# Patient Record
Sex: FEMALE | Race: WHITE | NOT HISPANIC OR LATINO | Employment: OTHER | URBAN - METROPOLITAN AREA
[De-identification: names, ages, dates, MRNs, and addresses within clinical notes are randomized per-mention and may not be internally consistent; named-entity substitution may affect disease eponyms.]

---

## 2017-01-18 ENCOUNTER — APPOINTMENT (OUTPATIENT)
Dept: MEDICAL GROUP | Facility: MEDICAL CENTER | Age: 72
End: 2017-01-18
Payer: MEDICARE

## 2017-03-08 ENCOUNTER — OFFICE VISIT (OUTPATIENT)
Dept: MEDICAL GROUP | Facility: MEDICAL CENTER | Age: 72
End: 2017-03-08
Payer: MEDICARE

## 2017-03-08 VITALS
DIASTOLIC BLOOD PRESSURE: 80 MMHG | HEIGHT: 65 IN | BODY MASS INDEX: 29.66 KG/M2 | SYSTOLIC BLOOD PRESSURE: 110 MMHG | TEMPERATURE: 98.1 F | WEIGHT: 178 LBS | OXYGEN SATURATION: 95 % | HEART RATE: 66 BPM

## 2017-03-08 DIAGNOSIS — J30.1 SEASONAL ALLERGIC RHINITIS DUE TO POLLEN: ICD-10-CM

## 2017-03-08 DIAGNOSIS — F41.9 ANXIETY: ICD-10-CM

## 2017-03-08 DIAGNOSIS — R41.3 MEMORY LOSS: ICD-10-CM

## 2017-03-08 DIAGNOSIS — E55.9 VITAMIN D DEFICIENCY: ICD-10-CM

## 2017-03-08 DIAGNOSIS — C44.91 BASAL CELL CARCINOMA: ICD-10-CM

## 2017-03-08 DIAGNOSIS — E78.5 HYPERLIPIDEMIA WITH TARGET LDL LESS THAN 130: ICD-10-CM

## 2017-03-08 DIAGNOSIS — R73.01 IMPAIRED FASTING GLUCOSE: ICD-10-CM

## 2017-03-08 DIAGNOSIS — K63.5 COLON POLYPS: ICD-10-CM

## 2017-03-08 DIAGNOSIS — N20.0 KIDNEY STONES: ICD-10-CM

## 2017-03-08 DIAGNOSIS — K64.8 INTERNAL HEMORRHOIDS: ICD-10-CM

## 2017-03-08 DIAGNOSIS — N84.1 CERVICAL POLYP: ICD-10-CM

## 2017-03-08 PROCEDURE — G0439 PPPS, SUBSEQ VISIT: HCPCS | Performed by: NURSE PRACTITIONER

## 2017-03-08 ASSESSMENT — PATIENT HEALTH QUESTIONNAIRE - PHQ9: CLINICAL INTERPRETATION OF PHQ2 SCORE: 0

## 2017-03-08 NOTE — ASSESSMENT & PLAN NOTE
Her last vitamin d in 2015 was 29.  She was on otc supplement but stopped.  Not on otc supplenment now.  Has done ergocalciferol in the past. Will recheck lab.

## 2017-03-08 NOTE — ASSESSMENT & PLAN NOTE
She needs to set f/u appt with derm.  She had a BCC removed from rt nose.  Hasn't seen them recently. She is known to [facility]

## 2017-03-08 NOTE — ASSESSMENT & PLAN NOTE
This was found during her last colonoscopy.  She will occas have cramping and itching.  None recently.  No blood in stool.  No black tarry stool.

## 2017-03-08 NOTE — MR AVS SNAPSHOT
"Mandie Juan   3/8/2017 9:00 AM   Office Visit   MRN: [id number]    Department:  [facility]   Dept Phone:  [phone]    Description:  Female : 1945   Provider:  VIRAJ Miller           Allergies as of 3/8/2017     Allergen Noted Reactions    Compazine 2009       Pcn [Penicillins] 2009       Sulfa Drugs 2009         You were diagnosed with     Impaired fasting glucose   [790.21.ICD-9-CM]   recheck a1c.  improve healthy diet and exercise.  do lab and f/u for review    Internal hemorrhoids   [256546]   use otc meds unless sx worsen.  then f/u for eval    Anxiety   [722745]   refer psych    Cervical polyp   [634790]   no current sx.  was removed.  will do vag exam with [email or web address] appt to review lab    Kidney stones   [801004]   no sx or tx needed    Colon polyps   [041637]   no sx or tx needed. last colonoscopy wnl.  repeat 5 yrs    Vitamin D deficiency   [9435847]   not on otc supplement.  will restart.  repeat lab and f/u for review    Hyperlipidemia with target LDL less than 130   [651499]   repeat lab.  last trg not at goal    Basal cell carcinoma   [555049]   refer to derm    Seasonal allergic rhinitis due to pollen   [9731505]   no current sx or tx needed    Memory loss   [780.93.ICD-9-CM]   f/u for complete eval.  do lab and f/u for eview      Vital Signs     Blood Pressure Pulse Temperature Height Weight Body Mass Index    110/80 mmHg 66 36.7 °C (98.1 °F) 1.651 m (5' 5\") 80.74 kg (178 lb) 29.62 kg/m2    Oxygen Saturation Last Menstrual Period Breastfeeding? Smoking Status          95% 1993 No Never Smoker         Basic Information     Date Of Birth Sex Race Ethnicity Preferred Language    1945 Female White Non- English      Problem List              ICD-10-CM Priority Class Noted - Resolved    Cervical polyp N84.1   Unknown - Present    Kidney stones N20.0   Unknown - Present    Colon polyps K63.5   Unknown - Present    Preventative health " care Z00.00   5/3/2010 - Present    Vitamin D deficiency E55.9   11/3/2011 - Present    Hyperlipidemia with target LDL less than 130 E78.5   11/3/2011 - Present    Basal cell carcinoma C44.91   Unknown - Present    Allergic rhinitis due to allergen J30.9   2/21/2014 - Present    Impaired fasting glucose R73.01   Unknown - Present    Internal hemorrhoids K64.8   Unknown - Present    Anxiety F41.9   Unknown - Present      Health Maintenance        Date Due Completion Dates    IMM ZOSTER VACCINE 10/4/2003 ---    MAMMOGRAM 11/9/2017 11/9/2016, 8/11/2015, 5/29/2014, 1/16/2013, 9/29/2011, 9/21/2010, 5/14/2009, 5/14/2009, 12/27/2006, 12/27/2006, 11/3/2005, 8/5/2004    BONE DENSITY 5/29/2019 5/29/2014, 9/29/2011    COLONOSCOPY 10/22/2020 10/22/2015    IMM DTaP/Tdap/Td Vaccine (2 - Td) 11/30/2022 11/30/2012            Current Immunizations     13-VALENT PCV PREVNAR 11/8/2016    Influenza TIV (IM) 12/6/2012, 11/3/2011  2:46 PM    Influenza Vaccine Adult HD 11/8/2016, 11/1/2015    Pneumococcal polysaccharide vaccine (PPSV-23) 12/31/2014    Tdap Vaccine 11/30/2012      Below and/or attached are the medications your provider expects you to take. Review all of your home medications and newly ordered medications with your provider and/or pharmacist. Follow medication instructions as directed by your provider and/or pharmacist. Please keep your medication list with you and share with your provider. Update the information when medications are discontinued, doses are changed, or new medications (including over-the-counter products) are added; and carry medication information at all times in the event of emergency situations     Allergies:  COMPAZINE - (reactions not documented)     PCN - (reactions not documented)     SULFA DRUGS - (reactions not documented)               Medications  Valid as of: March 08, 2017 - 10:36 AM    Generic Name Brand Name Tablet Size Instructions for use    Atorvastatin Calcium (Tab) LIPITOR 10 MG TAKE ONE  (1) TABLET BY MOUTH EVERY DAY        Losartan Potassium-HCTZ (Tab) HYZAAR 50-12.5 MG TAKE ONE (1) TABLET BY MOUTH EVERY DAY        Omeprazole (CAPSULE DELAYED RELEASE) PRILOSEC 20 MG Take 1 Cap by mouth every day.        .                 Medicines prescribed today were sent to:     [id number] - [place], [place] - [address]    [address] [place] [address]    Phone: [phone] Fax: [phone]    Open 24 Hours?: No      Medication refill instructions:       If your prescription bottle indicates you have medication refills left, it is not necessary to call your provider’s office. Please contact your pharmacy and they will refill your medication.    If your prescription bottle indicates you do not have any refills left, you may request refills at any time through one of the following ways: The online [employer] system (except Urgent Care), by calling your provider’s office, or by asking your pharmacy to contact your provider’s office with a refill request. Medication refills are processed only during regular business hours and may not be available until the next business day. Your provider may request additional information or to have a follow-up visit with you prior to refilling your medication.   *Please Note: Medication refills are assigned a new Rx number when refilled electronically. Your pharmacy may indicate that no refills were authorized even though a new prescription for the same medication is available at the pharmacy. Please request the medicine by name with the pharmacy before contacting your provider for a refill.        Your To Do List     Future Labs/Procedures Complete By Expires    COMP METABOLIC PANEL  As directed 3/9/2018    FOLATE  As directed 3/9/2018    FREE THYROXINE  As directed 3/9/2018    HEMOGLOBIN A1C  As directed 3/9/2018    LIPID PROFILE  As directed 3/9/2018    MICROALBUMIN CREAT RATIO URINE  As directed 3/9/2018    TSH  As directed 3/9/2018    VITAMIN B12  As directed 3/9/2018       VITAMIN D,25 HYDROXY  As directed 3/9/2018      Referral     A referral request has been sent to our patient care coordination department. Please allow 3-5 business days for us to process this request and contact you either by phone or mail. If you do not hear from us by the 5th business day, please call us at [phone].           [employer] Access Code: Activation code not generated  Current [employer] Status: Active

## 2017-03-08 NOTE — ASSESSMENT & PLAN NOTE
She is having some anxiety.  Feeling overwhelmed.  At andry time lots of stress. S he has her granddaughter all the time.  Her daughter was having etoh issues.  She is not on meds at this time.  She would  To see a counselor.  Doesn't want meds at this time.

## 2017-03-08 NOTE — ASSESSMENT & PLAN NOTE
Her last colonoscoyp in 2015 was wnl.  Internal hemorrhoids only.  Due recheck in 5 yrs.  No current issues.   No tx needed

## 2017-03-08 NOTE — PROGRESS NOTES
Subjective:      Mandie Juan is a 71 y.o. female who presents with No chief complaint on file.            HPI  Seen in f/u for [facility].  Feeling poorly.  She has been not feeling well for the last 2 weeks.  She is just now getting better. S he had bronchitis.  Not couging any more.  Used mucinex and otc cough med.  No fever, sore throat.  No SOB or wheezing.  Is still fatigued.  She is due her routine lab.    She is having some memory loss.  She has had 2 episodes of forgetting what year it was after having intercourse.  No headache.  She will have occas pain at the top of her head.  It is sharp but not with intercourse.      Internal hemorrhoids  This was found during her last colonoscopy.  She will occas have cramping and itching.  None recently.  No blood in stool.  No black tarry stool.     Anxiety  She is having some anxiety.  Feeling overwhelmed.  At andry time lots of stress. S he has her granddaughter all the time.  Her daughter was having etoh issues.  She is not on meds at this time.  She would  To see a counselor.  Doesn't want meds at this time.      Cervical Polyp  She had a cervical polyp removed about 5 years ago.  It was removed and benign.  Will recheck once wiht next appt.  No sx or issues.     Kidney Stones  No current sx or tx needed.      Colon Polyps  Her last colonoscoyp in 2015 was wnl.  Internal hemorrhoids only.  Due recheck in 5 yrs.  No current issues.   No tx needed    Vitamin D deficiency  Her last vitamin d in 2015 was 29.  She was on otc supplement but stopped.  Not on otc supplenment now.  Has done ergocalciferol in the past. Will recheck lab.     Hyperlipidemia with target LDL less than 130  Her last LP in 7/16 showed elevated trg.  Her HDL and LDL were wnl.  She is not on a low carb diet.  That is what makes her stomach feel well.  Not able to exercise this winter.  She is due repeat lab    Basal cell carcinoma  She needs to set f/u appt with derm.  She had a BCC removed from  rt nose.  Hasn't seen them recently. She is known to [facility]    Allergic rhinitis due to allergen  No current sx or tx needed.  No sx of infection with recent bronchitis    Impaired fasting glucose  Her last a1c last year was mildly elevated.  She is due repeat lab.  Not on low carb diet          HPI:  Mandie is a 71 y.o. here for Medicare Annual Wellness Visit     Patient Active Problem List    Diagnosis Date Noted   • Impaired fasting glucose    • Internal hemorrhoids    • Anxiety    • Allergic rhinitis due to allergen 02/21/2014   • Basal cell carcinoma    • Vitamin d deficiency 11/03/2011   • Hyperlipidemia with target LDL less than 130 11/03/2011   • Preventative health care 05/03/2010   • Cervical polyp    • Kidney stones    • Colon polyps        Current Outpatient Prescriptions   Medication Sig Dispense Refill   • atorvastatin (LIPITOR) 10 MG Tab TAKE ONE (1) TABLET BY MOUTH EVERY DAY 90 Tab 1   • losartan-hydrochlorothiazide (HYZAAR) 50-12.5 MG per tablet TAKE ONE (1) TABLET BY MOUTH EVERY DAY 30 Tab 5   • omeprazole (PRILOSEC) 20 MG delayed-release capsule Take 1 Cap by mouth every day. 30 Cap 3     No current facility-administered medications for this visit.            Current supplements as per medication list.       Allergies: Compazine; Pcn; and Sulfa drugs    Current social contact/activities:   1.  Reading  2.  skiing  3.  Walking    She  reports that she has never smoked. She has never used smokeless tobacco. She reports that she drinks about 2.0 oz of alcohol per week. She reports that she does not use illicit drugs.  Counseling given: Yes        DPA/Advanced directive: Patient do not have an advanced directive. If not on file, instructed to bring in a copy to scan into their chart. If no advanced directive exists, a packet and workshop information was given on advanced directives. Pt given info    ROS:    Gait: Uses no assistive device   Ostomy: no  Other tubes: no   Amputations: no   Chronic  oxygen use no   Last eye exam 2 weeks ago  : {DENIES DEFAULT:20195::occas mild incontinence.       Screening:    Depression Screening    Little interest or pleasure in doing things?  0 - not at all  Feeling down, depressed , or hopeless? 0 - not at all  Trouble falling or staying asleep, or sleeping too much?     Feeling tired or having little energy?     Poor appetite or overeating?     Feeling bad about yourself - or that you are a failure or have let yourself or your family down?    Trouble concentrating on things, such as reading the newspaper or watching television?    Moving or speaking so slowly that other people could have noticed.  Or the opposite - being so fidgety or restless that you have been moving around a lot more than usual?     Thoughts that you would be better off dead, or of hurting yourself?     Patient Health Questionnaire Score:      If depressive symptoms identified deferred to follow up visit unless specifically addressed in assesment and plan.      Screening for Cognitive Impairment    Three Minute Recall (banana, sunrise, fence)  3/3    Draw clock face with all 12 numbers set to the hand to show 10 minures past 11 o'clock  1    Cognitive concerns identified defferred for follow up unless specifically addressed in assesment and plan.    Fall Risk Assessment    Has the patient had two or more falls in the last year or any fall with injury in the last year?  No    Safety Assessment    Throw rugs on floor.  Yes  Handrails on all stairs.  Yes  Good lighting in all hallways.  Yes  Difficulty hearing.  No  Patient counseled about all safety risks that were identified.    Functional Assessment ADLs    Are there any barriers preventing you from cooking for yourself or meeting nutritional needs?  No.    Are there any barriers preventing you from driving safely or obtaining transportation?  No.    Are there any barriers preventing you from using a telephone or calling for help?  No.    Are there  "any barriers preventing you from shopping?  No.    Are there any barriers preventing you from taking care of your own finances?  No.    Are there any barriers preventing you from managing your medications?  No.    Are currently engaing any exercise or physical activity?  Yes.       Health Maintenance Summary                IMM ZOSTER VACCINE Overdue 10/4/2003     Annual Wellness Visit Overdue 2/22/2015      Done 2/21/2014     MAMMOGRAM Next Due 11/9/2017      Done 11/9/2016 MA-MAMMO SCREENING BILAT W/TOMOSYNTHESIS W/CAD     Patient has more history with this topic...    BONE DENSITY Next Due 5/29/2019      Done 5/29/2014 DS-BONE DENSITY STUDY (DEXA)     Patient has more history with this topic...    COLONOSCOPY Next Due 10/22/2020      Done 10/22/2015 AMB REFERRAL TO GI FOR COLONOSCOPY    IMM DTaP/Tdap/Td Vaccine Next Due 11/30/2022      Done 11/30/2012 Imm Admin: Tdap Vaccine          Patient Care Team:  VIRAJ Miller as PCP - General      Social History   Substance Use Topics   • Smoking status: Never Smoker    • Smokeless tobacco: Never Used   • Alcohol Use: 2.0 oz/week     4 Glasses of wine per week     Family History   Problem Relation Age of Onset   • Heart Disease Mother    • Cancer Father    • Hypertension Sister    • Hypertension Brother    • Cancer Paternal Grandfather      She  has a past medical history of Cervical polyp; Hyperlipidemia; Colon polyps; Kidney stones; Allergy; Vitamin d deficiency; Basal cell carcinoma (2004); Impaired fasting glucose; Internal hemorrhoids; and Anxiety. She also has no past medical history of Clotting disorder (CMS-HCC) or Breast cancer (CMS-HCC).   History reviewed. No pertinent past surgical history.    Exam:     Blood pressure 110/80, pulse 66, temperature 36.7 °C (98.1 °F), height 1.651 m (5' 5\"), weight 80.74 kg (178 lb), last menstrual period 03/01/1993, SpO2 95 %, not currently breastfeeding. Body mass index is 29.62 kg/(m^2).    Hearing excellent.  "   Dentition good  Alert, oriented in no acute distress.  Eye contact is good, speech goal directed, affect calm        Services needed: as per orders if indicated.  Health Care Screening: Age-appropriate preventive services Medicare covers discussed today and ordered if indicated.    Referrals offered: Community-based lifestyle interventions to reduce health risks and promote self-management and wellness, fall prevention, nutrition, physical activity, tobacco-use cessation, weight loss, and mental health services as per orders if indicated.    Discussion today about general wellness and lifestyle habits:    · Prevent falls and reduce trip hazards; Cautioned about securing or removing rugs.  · Have a working fire alarm and carbon monoxide detector;   · Engage in regular physical activity and social activities              Patient Active Problem List    Diagnosis Date Noted   • Impaired fasting glucose    • Allergic rhinitis due to allergen 02/21/2014   • Basal cell carcinoma    • Vitamin d deficiency 11/03/2011   • Hyperlipidemia with target LDL less than 130 11/03/2011   • Preventative health care 05/03/2010   • Cervical polyp    • Kidney stones    • Colon polyps      Current Outpatient Prescriptions   Medication Sig Dispense Refill   • atorvastatin (LIPITOR) 10 MG Tab TAKE ONE (1) TABLET BY MOUTH EVERY DAY 90 Tab 1   • losartan-hydrochlorothiazide (HYZAAR) 50-12.5 MG per tablet TAKE ONE (1) TABLET BY MOUTH EVERY DAY 30 Tab 5   • omeprazole (PRILOSEC) 20 MG delayed-release capsule Take 1 Cap by mouth every day. 30 Cap 3     No current facility-administered medications for this visit.     Allergies   Allergen Reactions   • Compazine    • Pcn [Penicillins]    • Sulfa Drugs        ROS  Review of Systems   Constitutional: Negative.  Negative for fever, chills, weight loss, iaphoresis.   HENT: Negative.  Negative for hearing loss, ear pain, nosebleeds, sore throat, neck pain, tinnitus and ear discharge.    Eyes:  "Negative.  Negative for blurred vision, double vision, photophobia, pain, discharge and redness.   Respiratory: Negative.  Negative for hemoptysis, shortness of breath, wheezing and stridor.    Cardiovascular: Negative.  Negative for chest pain, palpitations, orthopnea, claudication, leg swelling and PND.   Gastrointestinal: Negative.  Negative for heartburn, nausea, vomiting, abdominal pain, diarrhea, constipation, blood in stool and melena.   Genitourinary: Negative.  Negative for dysuria, urgency, frequency, incontinence, hematuria and flank pain.   Musculoskeletal: Negative.  Negative for myalgias, back pain, joint pain and falls.   Skin: Negative.  Negative for itching and rash.   Neurological: Negative.  Negative for dizziness, tingling, tremors, sensory change, speech change, focal weakness, seizures, loss of consciousness, weakness and headaches.   Endo/Heme/Allergies: Negative.  Negative for environmental allergies and polydipsia. Does not bruise/bleed easily.   Psychiatric/Behavioral: Negative.  Negative for depression, suicidal ideas, hallucinations, memory loss and substance abuse. The patient does not have insomnia.    All other systems reviewed and are negative.           Objective:     /80 mmHg  Pulse 66  Temp(Src) 36.7 °C (98.1 °F)  Ht 1.651 m (5' 5\")  Wt 80.74 kg (178 lb)  BMI 29.62 kg/m2  SpO2 95%  LMP 03/01/1993  Breastfeeding? No     Physical Exam      Physical Exam   Vitals reviewed.  Constitutional: oriented to person, place, and time. appears well-developed and well-nourished. No distress.   HENT: Head: Normocephalic and atraumatic. Bilateral tympanic membranes wnl w/o bulging.  Right Ear: External ear normal. Left Ear: External ear normal. Nose: Nose normal.  Mouth/Throat: Oropharynx is clear and moist. No oropharyngeal exudate. osbaldo tm wnl. Eyes: Conjunctivae and EOM are normal. Pupils are equal, round, and reactive to light. Right eye exhibits no discharge. Left eye exhibits no " discharge. No scleral icterus.    Neck: Normal range of motion. Neck supple. No JVD present.   Cardiovascular: Normal rate, regular rhythm, normal heart sounds and intact distal pulses.  Exam reveals no gallop and no friction rub.  No murmur heard.  No carotid bruits   Pulmonary/Chest: Effort normal and breath sounds normal. No stridor. No respiratory distress. no wheezes or rales. exhibits no tenderness.   Abdominal: Soft. Bowel sounds are normal. exhibits no distension and no mass. No tenderness. no rebound and no guarding.   Musculoskeletal: Normal range of motion. exhibits no edema or tenderness.  osbaldo pedal pulses 2+.  Lymphadenopathy:  no cervical or supraclavicular adenopathy.   Neurological: alert and oriented to person, place, and time. has normal reflexes. displays normal reflexes. No cranial nerve deficit. exhibits normal muscle tone. Coordination normal.   Skin: Skin is warm and dry. No rash noted. no diaphoresis. No erythema. No pallor.   Psychiatric: normal mood and affect. behavior is normal.            Assessment/Plan:     1. Impaired fasting glucose  Annual Wellness Visit - Includes PPPS Subsequent ()    COMP METABOLIC PANEL    HEMOGLOBIN A1C    MICROALBUMIN CREAT RATIO URINE    recheck a1c.  improve healthy diet and exercise.  do lab and f/u for review   2. Internal hemorrhoids  Annual Wellness Visit - Includes PPPS Subsequent ()    use otc meds unless sx worsen.  then f/u for eval   3. Anxiety  Annual Wellness Visit - Includes PPPS Subsequent ()    REFERRAL TO PSYCHOLOGY    refer psych   4. Cervical polyp  Annual Wellness Visit - Includes PPPS Subsequent ()    no current sx.  was removed.  will do vag exam with [facility] appt to review lab   5. Kidney stones  Annual Wellness Visit - Includes PPPS Subsequent ()    no sx or tx needed   6. Colon polyps  Annual Wellness Visit - Includes PPPS Subsequent ()    no sx or tx needed. last colonoscopy wnl.  repeat 5 yrs   7.  Vitamin D deficiency  Annual Wellness Visit - Includes PPPS Subsequent ()    VITAMIN D,25 HYDROXY    not on otc supplement.  will restart.  repeat lab and f/u for review   8. Hyperlipidemia with target LDL less than 130  Annual Wellness Visit - Includes PPPS Subsequent ()    COMP METABOLIC PANEL    LIPID PROFILE    repeat lab.  last trg not at goal   9. Basal cell carcinoma  Annual Wellness Visit - Includes PPPS Subsequent ()    REFERRAL TO DERMATOLOGY    refer to derm   10. Seasonal allergic rhinitis due to pollen  Annual Wellness Visit - Includes PPPS Subsequent ()    no current sx or tx needed   11. Memory loss  Annual Wellness Visit - Includes PPPS Subsequent ()    TSH    FREE THYROXINE    VITAMIN B12    FOLATE    f/u for complete eval.  do lab and f/u for eview

## 2017-03-08 NOTE — ASSESSMENT & PLAN NOTE
She had a cervical polyp removed about 5 years ago.  It was removed and benign.  Will recheck once wiht next appt.  No sx or issues.

## 2017-03-08 NOTE — ASSESSMENT & PLAN NOTE
Her last LP in 7/16 showed elevated trg.  Her HDL and LDL were wnl.  She is not on a low carb diet.  That is what makes her stomach feel well.  Not able to exercise this winter.  She is due repeat lab

## 2017-03-13 RX ORDER — LOSARTAN POTASSIUM AND HYDROCHLOROTHIAZIDE 12.5; 5 MG/1; MG/1
TABLET ORAL
Qty: 90 TAB | Refills: 3 | Status: SHIPPED | OUTPATIENT
Start: 2017-03-13 | End: 2018-04-26 | Stop reason: SDUPTHER

## 2017-03-23 ENCOUNTER — HOSPITAL ENCOUNTER (OUTPATIENT)
Dept: LAB | Facility: MEDICAL CENTER | Age: 72
End: 2017-03-23
Attending: NURSE PRACTITIONER
Payer: MEDICARE

## 2017-03-23 DIAGNOSIS — E78.5 HYPERLIPIDEMIA WITH TARGET LDL LESS THAN 130: ICD-10-CM

## 2017-03-23 DIAGNOSIS — R41.3 MEMORY LOSS: ICD-10-CM

## 2017-03-23 DIAGNOSIS — E55.9 VITAMIN D DEFICIENCY: ICD-10-CM

## 2017-03-23 DIAGNOSIS — R73.01 IMPAIRED FASTING GLUCOSE: ICD-10-CM

## 2017-03-23 LAB
25(OH)D3 SERPL-MCNC: 20 NG/ML (ref 30–100)
ALBUMIN SERPL BCP-MCNC: 4.2 G/DL (ref 3.2–4.9)
ALBUMIN/GLOB SERPL: 1.4 G/DL
ALP SERPL-CCNC: 65 U/L (ref 30–99)
ALT SERPL-CCNC: 24 U/L (ref 2–50)
ANION GAP SERPL CALC-SCNC: 7 MMOL/L (ref 0–11.9)
AST SERPL-CCNC: 15 U/L (ref 12–45)
BILIRUB SERPL-MCNC: 0.5 MG/DL (ref 0.1–1.5)
BUN SERPL-MCNC: 15 MG/DL (ref 8–22)
CALCIUM SERPL-MCNC: 9.7 MG/DL (ref 8.5–10.5)
CHLORIDE SERPL-SCNC: 102 MMOL/L (ref 96–112)
CHOLEST SERPL-MCNC: 206 MG/DL (ref 100–199)
CO2 SERPL-SCNC: 29 MMOL/L (ref 20–33)
CREAT SERPL-MCNC: 0.69 MG/DL (ref 0.5–1.4)
CREAT UR-MCNC: 189.3 MG/DL
EST. AVERAGE GLUCOSE BLD GHB EST-MCNC: 140 MG/DL
FOLATE SERPL-MCNC: 16.2 NG/ML
GLOBULIN SER CALC-MCNC: 2.9 G/DL (ref 1.9–3.5)
GLUCOSE SERPL-MCNC: 117 MG/DL (ref 65–99)
HBA1C MFR BLD: 6.5 % (ref 0–5.6)
HDLC SERPL-MCNC: 74 MG/DL
LDLC SERPL CALC-MCNC: 106 MG/DL
MICROALBUMIN UR-MCNC: 1.2 MG/DL
MICROALBUMIN/CREAT UR: 6 MG/G (ref 0–30)
POTASSIUM SERPL-SCNC: 3.6 MMOL/L (ref 3.6–5.5)
PROT SERPL-MCNC: 7.1 G/DL (ref 6–8.2)
SODIUM SERPL-SCNC: 138 MMOL/L (ref 135–145)
T4 FREE SERPL-MCNC: 0.8 NG/DL (ref 0.53–1.43)
TRIGL SERPL-MCNC: 131 MG/DL (ref 0–149)
TSH SERPL DL<=0.005 MIU/L-ACNC: 2.52 UIU/ML (ref 0.3–3.7)
VIT B12 SERPL-MCNC: 396 PG/ML (ref 211–911)

## 2017-03-23 PROCEDURE — 82570 ASSAY OF URINE CREATININE: CPT

## 2017-03-23 PROCEDURE — 83036 HEMOGLOBIN GLYCOSYLATED A1C: CPT

## 2017-03-23 PROCEDURE — 82306 VITAMIN D 25 HYDROXY: CPT

## 2017-03-23 PROCEDURE — 36415 COLL VENOUS BLD VENIPUNCTURE: CPT

## 2017-03-23 PROCEDURE — 84439 ASSAY OF FREE THYROXINE: CPT

## 2017-03-23 PROCEDURE — 84443 ASSAY THYROID STIM HORMONE: CPT

## 2017-03-23 PROCEDURE — 80053 COMPREHEN METABOLIC PANEL: CPT

## 2017-03-23 PROCEDURE — 82607 VITAMIN B-12: CPT

## 2017-03-23 PROCEDURE — 82746 ASSAY OF FOLIC ACID SERUM: CPT

## 2017-03-23 PROCEDURE — 82043 UR ALBUMIN QUANTITATIVE: CPT

## 2017-03-23 PROCEDURE — 80061 LIPID PANEL: CPT

## 2017-04-04 ENCOUNTER — OFFICE VISIT (OUTPATIENT)
Dept: MEDICAL GROUP | Facility: MEDICAL CENTER | Age: 72
End: 2017-04-04
Payer: MEDICARE

## 2017-04-04 VITALS
HEART RATE: 85 BPM | BODY MASS INDEX: 29.66 KG/M2 | HEIGHT: 65 IN | DIASTOLIC BLOOD PRESSURE: 70 MMHG | OXYGEN SATURATION: 95 % | SYSTOLIC BLOOD PRESSURE: 128 MMHG | WEIGHT: 178 LBS | TEMPERATURE: 97.7 F

## 2017-04-04 DIAGNOSIS — E55.9 VITAMIN D DEFICIENCY: ICD-10-CM

## 2017-04-04 DIAGNOSIS — R41.3 MEMORY LOSS, SHORT TERM: ICD-10-CM

## 2017-04-04 DIAGNOSIS — F41.9 ANXIETY: ICD-10-CM

## 2017-04-04 PROCEDURE — 99214 OFFICE O/P EST MOD 30 MIN: CPT | Performed by: NURSE PRACTITIONER

## 2017-04-04 PROCEDURE — G8432 DEP SCR NOT DOC, RNG: HCPCS | Performed by: NURSE PRACTITIONER

## 2017-04-04 PROCEDURE — 1036F TOBACCO NON-USER: CPT | Performed by: NURSE PRACTITIONER

## 2017-04-04 PROCEDURE — 1101F PT FALLS ASSESS-DOCD LE1/YR: CPT | Performed by: NURSE PRACTITIONER

## 2017-04-04 PROCEDURE — G8419 CALC BMI OUT NRM PARAM NOF/U: HCPCS | Performed by: NURSE PRACTITIONER

## 2017-04-04 PROCEDURE — 4040F PNEUMOC VAC/ADMIN/RCVD: CPT | Performed by: NURSE PRACTITIONER

## 2017-04-04 PROCEDURE — 3014F SCREEN MAMMO DOC REV: CPT | Performed by: NURSE PRACTITIONER

## 2017-04-04 RX ORDER — ERGOCALCIFEROL 1.25 MG/1
50000 CAPSULE ORAL
Qty: 12 CAP | Refills: 0 | Status: SHIPPED | OUTPATIENT
Start: 2017-04-04 | End: 2017-10-30

## 2017-04-04 NOTE — PROGRESS NOTES
Subjective:      Mandie Juan is a 71 y.o. female who presents with No chief complaint on file.            HPI  Seen in f/u for dec memory.  She has had dec memory for the last year.  Gradually worsening.    She has had 2 episodes of forgetting what year it was after intercourse.  No headache.  Checked b12, folate, TSH and T4.  They were all wnl.  The 2nd time it occurred she thinks r/t anxiety.  She is having less anxiety with dec family stress now.    Vitamin d was low at 20.  Not on otc supplement.    Patient Active Problem List    Diagnosis Date Noted   • Impaired fasting glucose    • Internal hemorrhoids    • Anxiety    • Allergic rhinitis due to allergen 02/21/2014   • Basal cell carcinoma    • Vitamin D deficiency 11/03/2011   • Hyperlipidemia with target LDL less than 130 11/03/2011   • Preventative health care 05/03/2010   • Cervical polyp    • Kidney stones    • Colon polyps      Current Outpatient Prescriptions   Medication Sig Dispense Refill   • losartan-hydrochlorothiazide (HYZAAR) 50-12.5 MG per tablet TAKE ONE (1) TABLET BY MOUTH EVERY DAY 90 Tab 3   • atorvastatin (LIPITOR) 10 MG Tab TAKE ONE (1) TABLET BY MOUTH EVERY DAY 90 Tab 1   • omeprazole (PRILOSEC) 20 MG delayed-release capsule Take 1 Cap by mouth every day. 30 Cap 3     No current facility-administered medications for this visit.     Allergies   Allergen Reactions   • Compazine    • Pcn [Penicillins]    • Sulfa Drugs        ROS  Review of Systems   Constitutional: Negative.  Negative for fever, chills, weight loss, malaise/fatigue and diaphoresis.   HENT: Negative.  Negative for hearing loss, ear pain, nosebleeds, congestion, sore throat, neck pain, tinnitus and ear discharge.    Respiratory: Negative.  Negative for cough, hemoptysis, sputum production, shortness of breath, wheezing and stridor.    Cardiovascular: Negative.  Negative for chest pain, palpitations, orthopnea, claudication, leg swelling and PND.              Objective:  "    /70 mmHg  Pulse 85  Temp(Src) 36.5 °C (97.7 °F)  Ht 1.651 m (5' 5\")  Wt 80.74 kg (178 lb)  BMI 29.62 kg/m2  SpO2 95%  LMP 03/01/1993  Breastfeeding? No     Physical Exam      Physical Exam   Vitals reviewed.  Constitutional: oriented to person, place, and time. appears well-developed and well-nourished. No distress.   Cardiovascular: Normal rate, regular rhythm, normal heart sounds and intact distal pulses.  Exam reveals no gallop and no friction rub.  No murmur heard.  No carotid bruits.   Pulmonary/Chest: Effort normal and breath sounds normal. No stridor. No respiratory distress. no wheezes or rales. exhibits no tenderness.   Musculoskeletal: Normal range of motion. exhibits no edema. osbaldo pedal pulses 2+.  Neurological: alert and oriented to person, place, and time. exhibits normal muscle tone. Coordination normal.   Skin: Skin is warm and dry. no diaphoresis.   Psychiatric: normal mood and affect. behavior is normal.            Assessment/Plan:     1. Memory loss, short term  CT-HEAD W/O    MMSE 30/30.  will do CT head.  lab is normal except d.  f/u 1 week for pelvic exam and lab review   2. Vitamin D deficiency  vitamin D, Ergocalciferol, (DRISDOL) 03975 UNITS Cap capsule    ergocalciferol x 12 weeks then d3 2000 units dialy    3. Anxiety      improved.  f/u with couselor as sched.         "

## 2017-04-04 NOTE — MR AVS SNAPSHOT
"Mandie Juan   2017 9:45 AM   Office Visit   MRN: [id number]    Department:  [facility]   Dept Phone:  [phone]    Description:  Female : 1945   Provider:  VIRAJ Miller           Allergies as of 2017     Allergen Noted Reactions    Compazine 2009       Pcn [Penicillins] 2009       Sulfa Drugs 2009         You were diagnosed with     Memory loss, short term   [805510]   MMSE 30.  will do CT head.  lab is normal except d.  f/u 1 week for pelvic exam and lab review    Vitamin D deficiency   [5956889]   ergocalciferol x 12 weeks then d3 2000 units dialy     Anxiety   [985377]   improved.  f/u with couselor as sched.        Vital Signs     Blood Pressure Pulse Temperature Height Weight Body Mass Index    128/70 mmHg 85 36.5 °C (97.7 °F) 1.651 m (5' 5\") 80.74 kg (178 lb) 29.62 kg/m2    Oxygen Saturation Last Menstrual Period Breastfeeding? Smoking Status          95% 1993 No Never Smoker         Basic Information     Date Of Birth Sex Race Ethnicity Preferred Language    1945 Female White Non- English      Your appointments     Apr 10, 2017 10:45 AM   Established Patient with VIRAJ Miller   [facility]    [address]  [facility] [address] [phone]           You will be receiving a confirmation call a few days before your appointment from our automated call confirmation system.            May 24, 2017 11:00 AM   Initial Behavioral Health Eval with TOM King   [facility]    [address]  [address]  [facility] [address] [phone]           30 MIN ARRIVAL PRIOR TO SCHEDULED APPOINTMENT IS REQUIRED. Your appointment will be rescheduled if you arrive later than 30 min before the appointed time.              Problem List              ICD-10-CM Priority Class Noted - Resolved    Cervical polyp N84.1   Unknown - Present   " Kidney stones N20.0   Unknown - Present    Colon polyps K63.5   Unknown - Present    Preventative health care Z00.00   5/3/2010 - Present    Vitamin D deficiency E55.9   11/3/2011 - Present    Hyperlipidemia with target LDL less than 130 E78.5   11/3/2011 - Present    Basal cell carcinoma C44.91   Unknown - Present    Allergic rhinitis due to allergen J30.9   2/21/2014 - Present    Impaired fasting glucose R73.01   Unknown - Present    Internal hemorrhoids K64.8   Unknown - Present    Anxiety F41.9   Unknown - Present      Health Maintenance        Date Due Completion Dates    IMM ZOSTER VACCINE 10/4/2003 ---    MAMMOGRAM 11/9/2017 11/9/2016, 8/11/2015, 5/29/2014, 1/16/2013, 9/29/2011, 9/21/2010, 5/14/2009, 5/14/2009, 12/27/2006, 12/27/2006, 11/3/2005, 8/5/2004    BONE DENSITY 5/29/2019 5/29/2014, 9/29/2011    COLONOSCOPY 10/22/2020 10/22/2015    IMM DTaP/Tdap/Td Vaccine (2 - Td) 11/30/2022 11/30/2012            Current Immunizations     13-VALENT PCV PREVNAR 11/8/2016    Influenza TIV (IM) 12/6/2012, 11/3/2011  2:46 PM    Influenza Vaccine Adult HD 11/8/2016, 11/1/2015    Pneumococcal polysaccharide vaccine (PPSV-23) 12/31/2014    Tdap Vaccine 11/30/2012      Below and/or attached are the medications your provider expects you to take. Review all of your home medications and newly ordered medications with your provider and/or pharmacist. Follow medication instructions as directed by your provider and/or pharmacist. Please keep your medication list with you and share with your provider. Update the information when medications are discontinued, doses are changed, or new medications (including over-the-counter products) are added; and carry medication information at all times in the event of emergency situations     Allergies:  COMPAZINE - (reactions not documented)     PCN - (reactions not documented)     SULFA DRUGS - (reactions not documented)               Medications  Valid as of: April 04, 2017 - 10:34 AM     Generic Name Brand Name Tablet Size Instructions for use    Atorvastatin Calcium (Tab) LIPITOR 10 MG TAKE ONE (1) TABLET BY MOUTH EVERY DAY        Ergocalciferol (Cap) DRISDOL 46748 UNITS Take 1 Cap by mouth every 7 days.        Losartan Potassium-HCTZ (Tab) HYZAAR 50-12.5 MG TAKE ONE (1) TABLET BY MOUTH EVERY DAY        Omeprazole (CAPSULE DELAYED RELEASE) PRILOSEC 20 MG Take 1 Cap by mouth every day.        .                 Medicines prescribed today were sent to:     [id number] - [place], [place] - [address]    [address] [place] [address]    Phone: [phone] Fax: [phone]    Open 24 Hours?: No      Medication refill instructions:       If your prescription bottle indicates you have medication refills left, it is not necessary to call your provider’s office. Please contact your pharmacy and they will refill your medication.    If your prescription bottle indicates you do not have any refills left, you may request refills at any time through one of the following ways: The online HiperScan system (except Urgent Care), by calling your provider’s office, or by asking your pharmacy to contact your provider’s office with a refill request. Medication refills are processed only during regular business hours and may not be available until the next business day. Your provider may request additional information or to have a follow-up visit with you prior to refilling your medication.   *Please Note: Medication refills are assigned a new Rx number when refilled electronically. Your pharmacy may indicate that no refills were authorized even though a new prescription for the same medication is available at the pharmacy. Please request the medicine by name with the pharmacy before contacting your provider for a refill.        Your To Do List     Future Labs/Procedures Complete By Expires    CT-HEAD W/O  As directed 10/5/2017         HiperScan Access Code: Activation code not generated  Current HiperScan Status:  Active

## 2017-04-10 ENCOUNTER — OFFICE VISIT (OUTPATIENT)
Dept: MEDICAL GROUP | Facility: MEDICAL CENTER | Age: 72
End: 2017-04-10
Payer: MEDICARE

## 2017-04-10 VITALS
SYSTOLIC BLOOD PRESSURE: 128 MMHG | DIASTOLIC BLOOD PRESSURE: 82 MMHG | WEIGHT: 178 LBS | HEIGHT: 65 IN | HEART RATE: 101 BPM | TEMPERATURE: 97.6 F | BODY MASS INDEX: 29.66 KG/M2 | OXYGEN SATURATION: 100 %

## 2017-04-10 DIAGNOSIS — R73.01 IMPAIRED FASTING GLUCOSE: ICD-10-CM

## 2017-04-10 DIAGNOSIS — E55.9 VITAMIN D DEFICIENCY: ICD-10-CM

## 2017-04-10 DIAGNOSIS — Z87.42 HISTORY OF CERVICAL POLYPECTOMY: ICD-10-CM

## 2017-04-10 DIAGNOSIS — Z98.890 HISTORY OF CERVICAL POLYPECTOMY: ICD-10-CM

## 2017-04-10 PROCEDURE — 4040F PNEUMOC VAC/ADMIN/RCVD: CPT | Performed by: NURSE PRACTITIONER

## 2017-04-10 PROCEDURE — 1101F PT FALLS ASSESS-DOCD LE1/YR: CPT | Performed by: NURSE PRACTITIONER

## 2017-04-10 PROCEDURE — 1036F TOBACCO NON-USER: CPT | Performed by: NURSE PRACTITIONER

## 2017-04-10 PROCEDURE — G8432 DEP SCR NOT DOC, RNG: HCPCS | Performed by: NURSE PRACTITIONER

## 2017-04-10 PROCEDURE — 3014F SCREEN MAMMO DOC REV: CPT | Performed by: NURSE PRACTITIONER

## 2017-04-10 PROCEDURE — 99214 OFFICE O/P EST MOD 30 MIN: CPT | Performed by: NURSE PRACTITIONER

## 2017-04-10 PROCEDURE — G8419 CALC BMI OUT NRM PARAM NOF/U: HCPCS | Performed by: NURSE PRACTITIONER

## 2017-04-10 NOTE — MR AVS SNAPSHOT
"Mandie Juan   4/10/2017 10:45 AM   Office Visit   MRN: [id number]    Department:  [facility]   Dept Phone:  [phone]    Description:  Female : 1945   Provider:  VIRAJ Miller           Allergies as of 4/10/2017     Allergen Noted Reactions    Compazine 2009       Pcn [Penicillins] 2009       Sulfa Drugs 2009         You were diagnosed with     Impaired fasting glucose   [790.21.ICD-9-CM]   improve low complex carb diet and exercise.  recheck a1c in 4m onths.  if not improved will start metformin.  f/u for lab review    Vitamin D deficiency   [5304379]   start ergocalciferol.  recheck d in 4 months.  f/u for lab review.     History of cervical polypectomy   [662036]   vaginal exam wnl.  no polyps noted.       Vital Signs     Blood Pressure Pulse Temperature Height Weight Body Mass Index    128/82 mmHg 101 36.4 °C (97.6 °F) 1.651 m (5' 5\") 80.74 kg (178 lb) 29.62 kg/m2    Oxygen Saturation Last Menstrual Period Breastfeeding? Smoking Status          100% 1993 No Never Smoker         Basic Information     Date Of Birth Sex Race Ethnicity Preferred Language    1945 Female White Non- English      Your appointments     May 24, 2017 11:00 AM   Initial Behavioral Health Eval with TOM King   [facility]    [address]  [address] [place] [address] [phone]           30 MIN ARRIVAL PRIOR TO SCHEDULED APPOINTMENT IS REQUIRED. Your appointment will be rescheduled if you arrive later than 30 min before the appointed time.              Problem List              ICD-10-CM Priority Class Noted - Resolved    Cervical polyp N84.1   Unknown - Present    Kidney stones N20.0   Unknown - Present    Colon polyps K63.5   Unknown - Present    Preventative health care Z00.00   5/3/2010 - Present    Vitamin D deficiency E55.9   11/3/2011 - Present    Hyperlipidemia with target LDL less than 130 E78.5   11/3/2011 " - Present    Basal cell carcinoma C44.91   Unknown - Present    Allergic rhinitis due to allergen J30.9   2/21/2014 - Present    Impaired fasting glucose R73.01   Unknown - Present    Internal hemorrhoids K64.8   Unknown - Present    Anxiety F41.9   Unknown - Present      Health Maintenance        Date Due Completion Dates    IMM ZOSTER VACCINE 10/4/2003 ---    MAMMOGRAM 11/9/2017 11/9/2016, 8/11/2015, 5/29/2014, 1/16/2013, 9/29/2011, 9/21/2010, 5/14/2009, 5/14/2009, 12/27/2006, 12/27/2006, 11/3/2005, 8/5/2004    BONE DENSITY 5/29/2019 5/29/2014, 9/29/2011    COLONOSCOPY 10/22/2020 10/22/2015    IMM DTaP/Tdap/Td Vaccine (2 - Td) 11/30/2022 11/30/2012            Current Immunizations     13-VALENT PCV PREVNAR 11/8/2016    Influenza TIV (IM) 12/6/2012, 11/3/2011  2:46 PM    Influenza Vaccine Adult HD 11/8/2016, 11/1/2015    Pneumococcal polysaccharide vaccine (PPSV-23) 12/31/2014    Tdap Vaccine 11/30/2012      Below and/or attached are the medications your provider expects you to take. Review all of your home medications and newly ordered medications with your provider and/or pharmacist. Follow medication instructions as directed by your provider and/or pharmacist. Please keep your medication list with you and share with your provider. Update the information when medications are discontinued, doses are changed, or new medications (including over-the-counter products) are added; and carry medication information at all times in the event of emergency situations     Allergies:  COMPAZINE - (reactions not documented)     PCN - (reactions not documented)     SULFA DRUGS - (reactions not documented)               Medications  Valid as of: April 10, 2017 - 11:27 AM    Generic Name Brand Name Tablet Size Instructions for use    Atorvastatin Calcium (Tab) LIPITOR 10 MG TAKE ONE (1) TABLET BY MOUTH EVERY DAY        Ergocalciferol (Cap) DRISDOL 19910 UNITS Take 1 Cap by mouth every 7 days.        Losartan Potassium-HCTZ (Tab)  HYZAAR 50-12.5 MG TAKE ONE (1) TABLET BY MOUTH EVERY DAY        Omeprazole (CAPSULE DELAYED RELEASE) PRILOSEC 20 MG Take 1 Cap by mouth every day.        .                 Medicines prescribed today were sent to:     [id number] - [place], [place] - [address]    [address] [place] [address]    Phone: [phone] Fax: [phone]    Open 24 Hours?: No      Medication refill instructions:       If your prescription bottle indicates you have medication refills left, it is not necessary to call your provider’s office. Please contact your pharmacy and they will refill your medication.    If your prescription bottle indicates you do not have any refills left, you may request refills at any time through one of the following ways: The online Schedulize system (except Urgent Care), by calling your provider’s office, or by asking your pharmacy to contact your provider’s office with a refill request. Medication refills are processed only during regular business hours and may not be available until the next business day. Your provider may request additional information or to have a follow-up visit with you prior to refilling your medication.   *Please Note: Medication refills are assigned a new Rx number when refilled electronically. Your pharmacy may indicate that no refills were authorized even though a new prescription for the same medication is available at the pharmacy. Please request the medicine by name with the pharmacy before contacting your provider for a refill.        Your To Do List     Future Labs/Procedures Complete By Expires    HEMOGLOBIN A1C  As directed 4/11/2018    VITAMIN D,25 HYDROXY  As directed 4/11/2018         Schedulize Access Code: Activation code not generated  Current Schedulize Status: Active

## 2017-04-10 NOTE — PROGRESS NOTES
Subjective:      Mandie Juan is a 71 y.o. female who presents with No chief complaint on file.            HPI  Seen in f/u for impaired fasting glucose and hx of cervical polyp.  She is feeling well.    Reviewed lab with pt.  Her vitamin d is low at 20. She will start ergocalciferol.  CMP is wnl except glucose is 117.  Her a1c is up from 6.2 last time to 6.5.  Feels that she can improve with diet and exercise.  Doesn't want to start meds yet.  She is walking for exercise.  B12, folate, GFR, ALB/CR RATIO is wnl  LP shows good trg and HDL.  LDL is almost at goal at 106.  Goal is <100.   She has a hx of lg cervical polyp removed several years ago.  No f/u pelvic exam done.  No current sx.  No hx of abn pap smear.  No current issues - pain or DUB.    She has started walking for exercise.  Walking about 2 mi daily.   Her  started reading a book this w/e for paleo and low fat/carb diet. T hey are going to start the diet.        Patient Active Problem List    Diagnosis Date Noted   • Impaired fasting glucose    • Internal hemorrhoids    • Anxiety    • Allergic rhinitis due to allergen 02/21/2014   • Basal cell carcinoma    • Vitamin D deficiency 11/03/2011   • Hyperlipidemia with target LDL less than 130 11/03/2011   • Preventative health care 05/03/2010   • Cervical polyp    • Kidney stones    • Colon polyps      Current Outpatient Prescriptions   Medication Sig Dispense Refill   • vitamin D, Ergocalciferol, (DRISDOL) 77187 UNITS Cap capsule Take 1 Cap by mouth every 7 days. 12 Cap 0   • losartan-hydrochlorothiazide (HYZAAR) 50-12.5 MG per tablet TAKE ONE (1) TABLET BY MOUTH EVERY DAY 90 Tab 3   • atorvastatin (LIPITOR) 10 MG Tab TAKE ONE (1) TABLET BY MOUTH EVERY DAY 90 Tab 1   • omeprazole (PRILOSEC) 20 MG delayed-release capsule Take 1 Cap by mouth every day. 30 Cap 3     No current facility-administered medications for this visit.     Allergies   Allergen Reactions   • Compazine    • Pcn [Penicillins]    •  "Sulfa Drugs        ROS    Review of Systems   Constitutional: Negative.  Negative for fever, chills, weight loss, malaise/fatigue and diaphoresis.   HENT: Negative.  Negative for hearing loss, ear pain, nosebleeds, congestion, sore throat, neck pain, tinnitus and ear discharge.    Respiratory: Negative.  Negative for cough, hemoptysis, sputum production, shortness of breath, wheezing and stridor.    Cardiovascular: Negative.  Negative for chest pain, palpitations, orthopnea, claudication, leg swelling and PND.   Gastrointestinal: denies nausea, vomiting, diarrhea, constipation, heartburn, melena or hematochezia.  Genitourinary: Denies dysuria, hematuria, urinary incontinence, frequency or urgency.    Musculoskeletal: Negative.  Negative for myalgias and back pain.   Neurological: Negative.  Negative for dizziness, tingling, tremors, weakness and headaches.   Psych:  Denies depression, anxiety or insomnia.  All other systems reviewed and are negative.         Objective:     /82 mmHg  Pulse 101  Temp(Src) 36.4 °C (97.6 °F)  Ht 1.651 m (5' 5\")  Wt 80.74 kg (178 lb)  BMI 29.62 kg/m2  SpO2 100%  LMP 03/01/1993  Breastfeeding? No     Physical Exam  Physical Exam   Vitals reviewed.  Constitutional: oriented to person, place, and time. appears well-developed and well-nourished. No distress.   Cardiovascular: Normal rate, regular rhythm, normal heart sounds and intact distal pulses.  Exam reveals no gallop and no friction rub.  No murmur heard.  No carotid bruits.   Pulmonary/Chest: Effort normal and breath sounds normal. No stridor. No respiratory distress. no wheezes or rales. exhibits no tenderness.   Musculoskeletal: Normal range of motion. exhibits no edema. osbaldo pedal pulses 2+.  Abd:  No CVAT,  Soft,  Bs noted in all quadrants.  No HSM.  No abdominal tenderness.  Neurological: alert and oriented to person, place, and time. exhibits normal muscle tone. Coordination normal.   Skin: Skin is warm and dry. no " diaphoresis.   Psychiatric: normal mood and affect. behavior is normal.   No pelvic pain, vaginal discharge or dyspareunia.  Pelvic Exam - vaginal exam performed.  Normal external genitalia with no lesions. Normal vaginal mucosa with normal rugation. Cervix has no visible lesions. No cervical motion tenderness. Uterus is normal sized with no masses. No adnexal tenderness or enlargement appreciated.                         Assessment/Plan:     1. Impaired fasting glucose  HEMOGLOBIN A1C    improve low complex carb diet and exercise.  recheck a1c in 4m onths.  if not improved will start metformin.  f/u for lab review   2. Vitamin D deficiency  VITAMIN D,25 HYDROXY    start ergocalciferol.  recheck d in 4 months.  f/u for lab review.    3. History of cervical polypectomy      vaginal exam wnl.  no polyps noted.

## 2017-04-20 ENCOUNTER — HOSPITAL ENCOUNTER (OUTPATIENT)
Dept: RADIOLOGY | Facility: MEDICAL CENTER | Age: 72
End: 2017-04-20
Attending: NURSE PRACTITIONER
Payer: MEDICARE

## 2017-04-20 ENCOUNTER — TELEPHONE (OUTPATIENT)
Dept: MEDICAL GROUP | Facility: MEDICAL CENTER | Age: 72
End: 2017-04-20

## 2017-04-20 DIAGNOSIS — R41.3 MEMORY LOSS, SHORT TERM: ICD-10-CM

## 2017-04-20 PROCEDURE — 70450 CT HEAD/BRAIN W/O DYE: CPT

## 2017-05-22 RX ORDER — ATORVASTATIN CALCIUM 10 MG/1
TABLET, FILM COATED ORAL
Qty: 90 TAB | Refills: 2 | Status: SHIPPED | OUTPATIENT
Start: 2017-05-22 | End: 2018-03-10 | Stop reason: SDUPTHER

## 2017-05-24 ENCOUNTER — OFFICE VISIT (OUTPATIENT)
Dept: BEHAVIORAL HEALTH | Facility: PHYSICIAN GROUP | Age: 72
End: 2017-05-24
Payer: MEDICARE

## 2017-05-24 DIAGNOSIS — F43.22 ADJUSTMENT DISORDER WITH ANXIETY: ICD-10-CM

## 2017-05-24 PROCEDURE — G8432 DEP SCR NOT DOC, RNG: HCPCS | Performed by: MARRIAGE & FAMILY THERAPIST

## 2017-05-24 PROCEDURE — 1036F TOBACCO NON-USER: CPT | Performed by: MARRIAGE & FAMILY THERAPIST

## 2017-05-24 PROCEDURE — 90791 PSYCH DIAGNOSTIC EVALUATION: CPT | Performed by: MARRIAGE & FAMILY THERAPIST

## 2017-05-30 PROBLEM — F43.22 ADJUSTMENT DISORDER WITH ANXIETY: Status: ACTIVE | Noted: 2017-05-30

## 2017-05-30 NOTE — BH THERAPY
[facility]  INITIAL ASSESSMENT    Name: Mandie Juan  MRN: [id number]  : 1945  Age: 71 y.o.  Date of assessment: 2017  PCP: VIRAJ Miller  Persons in attendance: Patient  Total session time: 45 minutes      CHIEF COMPLAINT AND HISTORY OF PRESENTING PROBLEM:  (as stated by Patient):  Mandie Juan is a 71 y.o., White female referred for assessment by Lora Bhatia A.P.N..  Primary presenting issue includes   Chief Complaint   Patient presents with   • Anxiety   .    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 Yes  Family History   Problem Relation Age of Onset   • Heart Disease Mother    • Cancer Father    • Hypertension Sister    • Hypertension Brother    • Cancer Paternal Grandfather         BEHAVIORAL HEALTH TREATMENT HISTORY  Does patient/parent report a history of prior behavioral health treatment for patient? No:    History of untreated behavioral health issues identified? No    MEDICAL HISTORY  Primary care behavioral health screenings: Patient Health Questionaire                                     If depressive symptoms identified deferred to follow up visit unless specifically addressed in assesment and plan.    Interpretation of PHQ-9 Total Score   Score Severity   1-4 Minimal Depression   5-9 Mild Depression   10-14 Moderate Depression   15-19 Moderately Severe Depression   20-27 Severe Depression      Past medical/surgical history:   Past Medical History   Diagnosis Date   • Cervical polyp      benign   • Hyperlipidemia    • Colon polyps    • Kidney stones    • Allergy    • Vitamin d deficiency    • Basal cell carcinoma 2004     Moh's surgery nose   • Impaired fasting glucose    • Internal hemorrhoids    • Anxiety       History reviewed. No pertinent past surgical history.     Medication Allergies:  Compazine; Pcn; and Sulfa drugs     Patient reports last physical exam: 3/2017  Does patient/parent report any history of or current developmental concerns? No  Does patient/parent report nutritional concerns? No  Does patient/parent report change in appetite or weight loss/gain? No  Does patient/parent report history of eating disorder symptoms? No  Does patient/parent report dental problem? No  Does patient/parent report physical pain? No   Indicate if pain is acute or chronic, and location: na   Pain scale rating:       Does patient/parent report functional impact of medical, developmental, or pain issues?   no    EDUCATIONAL  Is patient currently enrolled in a school/educational program?   No:   Highest grade level completed: BS in business/sales  School performance/functioning: good  History of Special Education/repeated grades/learning issues: no  Preferred learning style: all  Current learning needs (large print, language barrier, etc):  no    EMPLOYMENT/RESOURCES  Is the patient currently employed? No retired, volunteers at [employer], active w/ friends  Does the patient/parent report adequate financial resources? Yes  Does patient identify impact of presenting issue on work functioning? No  Work or income-related stressors:  na     HISTORY:  Does patient report current or past enlistment? No    [If yes, trigger below section]  Does patient report history of exposure to combat? No  Does patient report history of  sexual trauma? No  Does patient report other -related stressors?  No    SPIRITUAL/CULTURAL/IDENTITY:  What are the patient’s/family’s spiritual beliefs or practices? Muslim, just started attending [employer]  What is the patient’s cultural or ethnic background/identity? cauc  How does the patient identify their sexual orientation? het  How does the patient identify their gender? female  Does the patient identify any spiritual/cultural/identity factors as relevant to the presenting issue? No    LEGAL HISTORY  Has the patient ever been involved with juvenile, adult, or family legal systems? No   [If yes, trigger section below:]  Does patient report ever being a victim of a crime?  No  Does patient report involvement in any current legal issues?  No  Does patient report ever being arrested or committing a crime? No  Does patient report any current agency (parole/probation/CPS/) involvement? No    ABUSE/NEGLECT/TRAUMA SCREENING  Does patient report feeling “unsafe” in his/her home, or afraid of anyone? No  Does patient report any history of physical, sexual, or emotional abuse? No  Does parent or significant other report any of the above? na  Is there evidence of neglect by self? No  Is there evidence of neglect by a caregiver? No  Does the patient/parent report any history of CPS/APS/police involvement related to suspected abuse/neglect or domestic violence? No  Does the patient/parent report any other history of potentially traumatic life events? No  Based on the information provided during the current assessment, is a mandated report of suspected abuse/neglect being made?  No     SAFETY ASSESSMENT - SELF  Does patient acknowledge current or past symptoms of dangerousness to self? No  Does parent/significant other report patient has current or past symptoms of dangerousness to self? na      Recent change in frequency/specificity/intensity of suicidal thoughts or self-harm behavior? No  Current access to firearms, medications, or other identified means of  suicide/self-harm? No  If yes, willing to restrict access to means of suicide/self-harm? na  Protective factors present: Future-oriented, Good impulse control, Moral objection to suicide, Positive self-efficacy, Spiritual beliefs/practices and Strong family connections    Current Suicide Risk: Low  Crisis Safety Plan completed and copy given to patient: No    SAFETY ASSESSMENT - OTHERS  Does paor past symptoms of aggressive behavior or risk to others? No  Does parent/significant othtient acknowledge current or past symptoms of aggressive behavior or risk to others? No  Does parent/significant other report patient has current or past symptoms of aggressive behavior or risk to others? na    Recent change in frequency/specificity/intensity of thoughts or threats to harm others? No  Current access to firearms/other identified means of harm? No  If yes, willing to restrict access to weapons/means of harm? na  Protective factors present: Good frustration tolerance, Moral/spiritual prohibition, Well-developed sense of empathy, Positive impulse-control, Stable relationships and Low rumination/obsession    Current Homicide Risk:  Not applicable  Crisis Safety Plan completed and copy given to patient? No  Based on information provided during the current assessment, is a mandated “duty to warn” being exercised? No    SUBSTANCE USE/ADDICTION HISTORY  [] Not applicable - patient 10 years of age or younger    Is there a family history of substance use/addiction? Yes  Does patient acknowledge or parent/significant other report use of/dependence on substances? No  Last time patient used alcohol: wkend  Within the past week? Yes pt and husb did a 30 day cleanse and stopped all alcohol, then recently began again, but rarely  Last time patient used marijuana: never  Within the past month? No  Any other street drugs ever tried even once? No  Any use of prescription medications/pills without a prescription, or for reasons others than  originally prescribed?  No  Any other addictive behavior reported (gambling, shopping, sex)? No     Drug History:  Amphetamine:  Amphetamine frequency: Never used      Cannibis:  Cannabis frequency: Never used      Cocaine:  Cocaine frequency: Never used      Ecstasy:  Ecstasy frequency: Never used      Hallucinogen:  Hallucinogen frequency: Never used      Inhalant:   Inhalant frequency: Never used      Opiate:  Opiate frequency: Never used  Cannabis frequency: Never used      Other:  Other drug frequency: Never used      Sedative:   Sedative frequency: Never used          What consequences does the patient associate with any of the above substance use and or addictive behaviors? None    Patient’s motivation/readiness for change: na    [] Patient denies use of any substance/addictive behaviors    STRENGTHS/ASSETS  Strengths Identified by interviewer: Insight into problems, Self-awareness, Family suppport and Stable relationships  Strengths Identified by patient: intelligence, caring    MENTAL STATUS/OBSERVATIONS   Participation: Active verbal participation and Open to feedback  Grooming: Neat  Orientation:Alert   Behavior: Calm  Eye contact: Good   Mood:Anxious  Affect:Anxious  Thought process: Logical  Thought content:  Within normal limits  Speech: Rate within normal limits  Perception: Within normal limits  Memory: No gross evidence of memory deficits  Insight: Good  Judgment:  Good  Other:    Family/couple interaction observations: na    RESULTS OF SCREENING MEASURES:  [] Not applicable  Measure:   Score:     Measure:   Score:       CLINICAL FORMULATION: 70 yo Mandie here because she's worried about her urbano and g'urbano; says when she made appt she was more in crisis because urbano had been on verge of losing job, but that has become less of an issue, thinks she's getting help as employers told her she had to do; would like to just be a grandma, not the caretaker daily,but says this is what it is; disc taking her  places this summer, which she's doing; self-care      DIAGNOSTIC IMPRESSION(S):  1. Adjustment disorder with anxiety          IDENTIFIED NEEDS/PLAN:  [If any of these marked, trigger DISPOSITION list]  Mood/anxiety and Family/Couples conflict  Actively being addressed by [facility]    Does patient express agreement with the above plan? Yes     Referral appointment(s) scheduled? w/this therapist     End time of session: 10/45    DANNY King.

## 2017-08-01 ENCOUNTER — OFFICE VISIT (OUTPATIENT)
Dept: MEDICAL GROUP | Facility: MEDICAL CENTER | Age: 72
End: 2017-08-01
Payer: MEDICARE

## 2017-08-01 VITALS
DIASTOLIC BLOOD PRESSURE: 68 MMHG | OXYGEN SATURATION: 95 % | WEIGHT: 166 LBS | TEMPERATURE: 98.2 F | HEIGHT: 65 IN | SYSTOLIC BLOOD PRESSURE: 120 MMHG | BODY MASS INDEX: 27.66 KG/M2 | HEART RATE: 76 BPM

## 2017-08-01 DIAGNOSIS — L72.9 CYST OF SKIN: ICD-10-CM

## 2017-08-01 PROCEDURE — 99214 OFFICE O/P EST MOD 30 MIN: CPT | Performed by: NURSE PRACTITIONER

## 2017-08-01 NOTE — MR AVS SNAPSHOT
"        Mandie Juan   2017 11:00 AM   Office Visit   MRN: [id number]    Department:  [facility]   Dept Phone:  [phone]    Description:  Female : 1945   Provider:  VIRAJ Miller           Allergies as of 2017     Allergen Noted Reactions    Compazine 2009       Pcn [Penicillins] 2009       Sulfa Drugs 2009         You were diagnosed with     Cyst of skin   [127688]   no tx at this time.  will monitor.  f/u if sx change.  do lab and f/u for HRA and review in oct.      Vital Signs     Blood Pressure Pulse Temperature Height Weight Body Mass Index    120/68 mmHg 76 36.8 °C (98.2 °F) 1.651 m (5' 5\") 75.297 kg (166 lb) 27.62 kg/m2    Oxygen Saturation Last Menstrual Period Breastfeeding? Smoking Status          95% 1993 No Never Smoker         Basic Information     Date Of Birth Sex Race Ethnicity Preferred Language    1945 Female White Non- English      Your appointments     Aug 10, 2017 11:00 AM   Individual Therapy with TOM King   [facility]    [address]  [address]  [facility] [address] [phone]              Problem List              ICD-10-CM Priority Class Noted - Resolved    Cervical polyp N84.1   Unknown - Present    Kidney stones N20.0   Unknown - Present    Colon polyps K63.5   Unknown - Present    Preventative health care Z00.00   5/3/2010 - Present    Vitamin D deficiency E55.9   11/3/2011 - Present    Hyperlipidemia with target LDL less than 130 E78.5   11/3/2011 - Present    Basal cell carcinoma C44.91   Unknown - Present    Allergic rhinitis due to allergen J30.9   2014 - Present    Impaired fasting glucose R73.01   Unknown - Present    Internal hemorrhoids K64.8   Unknown - Present    Anxiety F41.9   Unknown - Present    Adjustment disorder with anxiety F43.22   2017 - Present      Health Maintenance        Date Due Completion Dates    IMM ZOSTER VACCINE 10/4/2003 " ---    IMM INFLUENZA (1) 9/1/2017 11/8/2016, 11/1/2015, 12/6/2012, 11/3/2011    MAMMOGRAM 11/9/2017 11/9/2016, 8/11/2015, 5/29/2014, 1/16/2013, 9/29/2011, 9/21/2010, 5/14/2009, 5/14/2009, 12/27/2006, 12/27/2006, 11/3/2005, 8/5/2004    BONE DENSITY 5/29/2019 5/29/2014, 9/29/2011    COLONOSCOPY 10/22/2020 10/22/2015    IMM DTaP/Tdap/Td Vaccine (2 - Td) 11/30/2022 11/30/2012            Current Immunizations     13-VALENT PCV PREVNAR 11/8/2016    Influenza TIV (IM) 12/6/2012, 11/3/2011  2:46 PM    Influenza Vaccine Adult HD 11/8/2016, 11/1/2015    Pneumococcal polysaccharide vaccine (PPSV-23) 12/31/2014    Tdap Vaccine 11/30/2012      Below and/or attached are the medications your provider expects you to take. Review all of your home medications and newly ordered medications with your provider and/or pharmacist. Follow medication instructions as directed by your provider and/or pharmacist. Please keep your medication list with you and share with your provider. Update the information when medications are discontinued, doses are changed, or new medications (including over-the-counter products) are added; and carry medication information at all times in the event of emergency situations     Allergies:  COMPAZINE - (reactions not documented)     PCN - (reactions not documented)     SULFA DRUGS - (reactions not documented)               Medications  Valid as of: August 01, 2017 - 11:53 AM    Generic Name Brand Name Tablet Size Instructions for use    Atorvastatin Calcium (Tab) LIPITOR 10 MG TAKE ONE (1) TABLET BY MOUTH EVERY DAY        Ergocalciferol (Cap) DRISDOL 18116 UNITS Take 1 Cap by mouth every 7 days.        Losartan Potassium-HCTZ (Tab) HYZAAR 50-12.5 MG TAKE ONE (1) TABLET BY MOUTH EVERY DAY        Omeprazole (CAPSULE DELAYED RELEASE) PRILOSEC 20 MG Take 1 Cap by mouth every day.        .                 Medicines prescribed today were sent to:     [id number] - [place], [place] - [address]    [address]  [place] [address]    Phone: [phone] Fax: [phone]    Open 24 Hours?: No      Medication refill instructions:       If your prescription bottle indicates you have medication refills left, it is not necessary to call your provider’s office. Please contact your pharmacy and they will refill your medication.    If your prescription bottle indicates you do not have any refills left, you may request refills at any time through one of the following ways: The online [employer] system (except Urgent Care), by calling your provider’s office, or by asking your pharmacy to contact your provider’s office with a refill request. Medication refills are processed only during regular business hours and may not be available until the next business day. Your provider may request additional information or to have a follow-up visit with you prior to refilling your medication.   *Please Note: Medication refills are assigned a new Rx number when refilled electronically. Your pharmacy may indicate that no refills were authorized even though a new prescription for the same medication is available at the pharmacy. Please request the medicine by name with the pharmacy before contacting your provider for a refill.           [employer] Access Code: Activation code not generated  Current [employer] Status: Active

## 2017-08-01 NOTE — PROGRESS NOTES
Subjective:      Mandie Juan is a 71 y.o. female who presents with No chief complaint on file.            HPI  Seen in f/u for abd mass.  She noted it several months ago.  No pain.  No tenderness.  Doesn't bother her.  She just noted it.  bm's are fine.  Has a hx of BCC on rt nose.  No skin lesion noted on abd.    She is feeling well.  She lost 11 lbs doing a 1 month challenge.  She had stopped all wine.  Now using on minimally.  No sugars.  No carbs except for occas long grain rice.     Patient Active Problem List    Diagnosis Date Noted   • Adjustment disorder with anxiety 05/30/2017   • Impaired fasting glucose    • Internal hemorrhoids    • Anxiety    • Allergic rhinitis due to allergen 02/21/2014   • Basal cell carcinoma    • Vitamin D deficiency 11/03/2011   • Hyperlipidemia with target LDL less than 130 11/03/2011   • Preventative health care 05/03/2010   • Cervical polyp    • Kidney stones    • Colon polyps      Current Outpatient Prescriptions   Medication Sig Dispense Refill   • atorvastatin (LIPITOR) 10 MG Tab TAKE ONE (1) TABLET BY MOUTH EVERY DAY 90 Tab 2   • vitamin D, Ergocalciferol, (DRISDOL) 04427 UNITS Cap capsule Take 1 Cap by mouth every 7 days. 12 Cap 0   • losartan-hydrochlorothiazide (HYZAAR) 50-12.5 MG per tablet TAKE ONE (1) TABLET BY MOUTH EVERY DAY 90 Tab 3   • omeprazole (PRILOSEC) 20 MG delayed-release capsule Take 1 Cap by mouth every day. 30 Cap 3     No current facility-administered medications for this visit.     Allergies   Allergen Reactions   • Compazine    • Pcn [Penicillins]    • Sulfa Drugs        ROS    Review of Systems   Constitutional: Negative.  Negative for fever, chills, weight loss, malaise/fatigue and diaphoresis.   HENT: Negative.  Negative for hearing loss, ear pain, nosebleeds, congestion, sore throat, neck pain, tinnitus and ear discharge.    Respiratory: Negative.  Negative for cough, hemoptysis, sputum production, shortness of breath, wheezing and stridor.   "  Cardiovascular: Negative.  Negative for chest pain, palpitations, orthopnea, claudication, leg swelling and PND.   Gastrointestinal: denies nausea, vomiting, diarrhea, constipation, heartburn, melena or hematochezia.  Genitourinary: Denies dysuria, hematuria, urinary incontinence, frequency or urgency.         Objective:     /68 mmHg  Pulse 76  Temp(Src) 36.8 °C (98.2 °F)  Ht 1.651 m (5' 5\")  Wt 75.297 kg (166 lb)  BMI 27.62 kg/m2  SpO2 95%  LMP 03/01/1993  Breastfeeding? No     Physical Exam      Physical Exam   Vitals reviewed.  Constitutional: oriented to person, place, and time. appears well-developed and well-nourished. No distress.   Cardiovascular: Normal rate, regular rhythm, normal heart sounds and intact distal pulses.  Exam reveals no gallop and no friction rub.  No murmur heard.  No carotid bruits.   Pulmonary/Chest: Effort normal and breath sounds normal. No stridor. No respiratory distress. no wheezes or rales. exhibits no tenderness.   Musculoskeletal: Normal range of motion. exhibits no edema. osbaldo pedal pulses 2+.  Abd:  No CVAT,  Soft,  Bs noted in all quadrants.  No HSM.  No abdominal tenderness.  LLQ <1mm round cyst palp w/o reddness, tenderness.    Neurological: alert and oriented to person, place, and time. exhibits normal muscle tone. Coordination normal.   Skin: Skin is warm and dry. no diaphoresis.   Psychiatric: normal mood and affect. behavior is normal.            Assessment/Plan:     1. Cyst of skin      no tx at this time.  will monitor.  f/u if sx change.  do lab and f/u for HRA and review in oct.       "

## 2017-09-11 ENCOUNTER — TELEPHONE (OUTPATIENT)
Dept: MEDICAL GROUP | Facility: MEDICAL CENTER | Age: 72
End: 2017-09-11

## 2017-09-11 RX ORDER — FLUTICASONE PROPIONATE 50 MCG
1 SPRAY, SUSPENSION (ML) NASAL DAILY
Qty: 16 G | Refills: 1 | Status: SHIPPED | OUTPATIENT
Start: 2017-09-11 | End: 2018-12-04

## 2017-10-26 ENCOUNTER — HOSPITAL ENCOUNTER (OUTPATIENT)
Dept: LAB | Facility: MEDICAL CENTER | Age: 72
End: 2017-10-26
Attending: NURSE PRACTITIONER
Payer: MEDICARE

## 2017-10-26 DIAGNOSIS — R73.01 IMPAIRED FASTING GLUCOSE: ICD-10-CM

## 2017-10-26 DIAGNOSIS — E55.9 VITAMIN D DEFICIENCY: ICD-10-CM

## 2017-10-26 DIAGNOSIS — E78.5 HYPERLIPIDEMIA WITH TARGET LDL LESS THAN 130: ICD-10-CM

## 2017-10-26 DIAGNOSIS — M79.10 MYALGIA: ICD-10-CM

## 2017-10-26 LAB
25(OH)D3 SERPL-MCNC: 17 NG/ML (ref 30–100)
ALBUMIN SERPL BCP-MCNC: 4 G/DL (ref 3.2–4.9)
ALBUMIN/GLOB SERPL: 1.5 G/DL
ALP SERPL-CCNC: 61 U/L (ref 30–99)
ALT SERPL-CCNC: 19 U/L (ref 2–50)
ANION GAP SERPL CALC-SCNC: 8 MMOL/L (ref 0–11.9)
AST SERPL-CCNC: 17 U/L (ref 12–45)
BILIRUB SERPL-MCNC: 0.6 MG/DL (ref 0.1–1.5)
BUN SERPL-MCNC: 15 MG/DL (ref 8–22)
CALCIUM SERPL-MCNC: 9.3 MG/DL (ref 8.5–10.5)
CHLORIDE SERPL-SCNC: 105 MMOL/L (ref 96–112)
CHOLEST SERPL-MCNC: 194 MG/DL (ref 100–199)
CK SERPL-CCNC: 54 U/L (ref 0–154)
CO2 SERPL-SCNC: 25 MMOL/L (ref 20–33)
CREAT SERPL-MCNC: 0.64 MG/DL (ref 0.5–1.4)
EST. AVERAGE GLUCOSE BLD GHB EST-MCNC: 131 MG/DL
GFR SERPL CREATININE-BSD FRML MDRD: >60 ML/MIN/1.73 M 2
GLOBULIN SER CALC-MCNC: 2.7 G/DL (ref 1.9–3.5)
GLUCOSE SERPL-MCNC: 103 MG/DL (ref 65–99)
HBA1C MFR BLD: 6.2 % (ref 0–5.6)
HDLC SERPL-MCNC: 69 MG/DL
LDLC SERPL CALC-MCNC: 103 MG/DL
POTASSIUM SERPL-SCNC: 3.7 MMOL/L (ref 3.6–5.5)
PROT SERPL-MCNC: 6.7 G/DL (ref 6–8.2)
SODIUM SERPL-SCNC: 138 MMOL/L (ref 135–145)
TRIGL SERPL-MCNC: 108 MG/DL (ref 0–149)

## 2017-10-26 PROCEDURE — 80053 COMPREHEN METABOLIC PANEL: CPT

## 2017-10-26 PROCEDURE — 82306 VITAMIN D 25 HYDROXY: CPT

## 2017-10-26 PROCEDURE — 80061 LIPID PANEL: CPT

## 2017-10-26 PROCEDURE — 83036 HEMOGLOBIN GLYCOSYLATED A1C: CPT

## 2017-10-26 PROCEDURE — 82550 ASSAY OF CK (CPK): CPT

## 2017-10-26 PROCEDURE — 36415 COLL VENOUS BLD VENIPUNCTURE: CPT

## 2017-10-30 ENCOUNTER — OFFICE VISIT (OUTPATIENT)
Dept: MEDICAL GROUP | Facility: MEDICAL CENTER | Age: 72
End: 2017-10-30
Payer: MEDICARE

## 2017-10-30 VITALS
WEIGHT: 166 LBS | RESPIRATION RATE: 16 BRPM | HEART RATE: 77 BPM | TEMPERATURE: 97.6 F | HEIGHT: 65 IN | BODY MASS INDEX: 27.66 KG/M2 | OXYGEN SATURATION: 93 % | DIASTOLIC BLOOD PRESSURE: 78 MMHG | SYSTOLIC BLOOD PRESSURE: 124 MMHG

## 2017-10-30 DIAGNOSIS — R73.01 IFG (IMPAIRED FASTING GLUCOSE): ICD-10-CM

## 2017-10-30 DIAGNOSIS — I10 ESSENTIAL HYPERTENSION: ICD-10-CM

## 2017-10-30 DIAGNOSIS — Z23 NEED FOR INFLUENZA VACCINATION: ICD-10-CM

## 2017-10-30 DIAGNOSIS — Z12.31 ENCOUNTER FOR SCREENING MAMMOGRAM FOR MALIGNANT NEOPLASM OF BREAST: ICD-10-CM

## 2017-10-30 DIAGNOSIS — Z85.828 HISTORY OF BASAL CELL CANCER: ICD-10-CM

## 2017-10-30 DIAGNOSIS — E55.9 VITAMIN D DEFICIENCY: ICD-10-CM

## 2017-10-30 DIAGNOSIS — E78.5 HYPERLIPIDEMIA LDL GOAL <100: ICD-10-CM

## 2017-10-30 PROCEDURE — 90662 IIV NO PRSV INCREASED AG IM: CPT | Performed by: NURSE PRACTITIONER

## 2017-10-30 PROCEDURE — G0008 ADMIN INFLUENZA VIRUS VAC: HCPCS | Performed by: NURSE PRACTITIONER

## 2017-10-30 PROCEDURE — 99214 OFFICE O/P EST MOD 30 MIN: CPT | Mod: 25 | Performed by: NURSE PRACTITIONER

## 2017-10-30 RX ORDER — ERGOCALCIFEROL 1.25 MG/1
50000 CAPSULE ORAL
Qty: 12 CAP | Refills: 0 | Status: SHIPPED | OUTPATIENT
Start: 2017-10-30 | End: 2018-03-08

## 2017-10-30 NOTE — PROGRESS NOTES
Subjective:     Mandie Juan is a 72 y.o. female who presents with   Chief Complaint   Patient presents with   • Follow-Up   .    HPI:   Seen in f/u for HTN.  Her bp is well controlled.  stalbe on meds.    Feeling well.  She is due her flu shot.    She is due her mammo.    Reviewed lab with pt. Her GFR, CPK, is wnl.  LP shows good trg and HDL.  LDL is almost wnl.  Down from 106 to 103.  Goal is <100.  Stable on lipitor.  CMP is wnl except glucose mildly elevated.  Her a1c is down from 6.5 last time to 6.2.  She has been exercising regularly.  Drastically dec carbs in her diet.    Vitamin d is low.  Has been low in past.  Not on otc supplment.   She has a hx of BCC on rt nose.  Hasn't seen derm long term.        Patient Active Problem List    Diagnosis Date Noted   • Adjustment disorder with anxiety 05/30/2017   • Impaired fasting glucose    • Internal hemorrhoids    • Anxiety    • Allergic rhinitis due to allergen 02/21/2014   • Basal cell carcinoma    • Vitamin D deficiency 11/03/2011   • Hyperlipidemia with target LDL less than 130 11/03/2011   • Preventative health care 05/03/2010   • Cervical polyp    • Kidney stones    • Colon polyps        Current medicines (including changes today)  Current Outpatient Prescriptions   Medication Sig Dispense Refill   • vitamin D, Ergocalciferol, (DRISDOL) 25608 units Cap capsule Take 1 Cap by mouth every 7 days. 12 Cap 0   • fluticasone (FLONASE) 50 MCG/ACT nasal spray Spray 1 Spray in nose every day. 16 g 1   • atorvastatin (LIPITOR) 10 MG Tab TAKE ONE (1) TABLET BY MOUTH EVERY DAY 90 Tab 2   • losartan-hydrochlorothiazide (HYZAAR) 50-12.5 MG per tablet TAKE ONE (1) TABLET BY MOUTH EVERY DAY 90 Tab 3   • omeprazole (PRILOSEC) 20 MG delayed-release capsule Take 1 Cap by mouth every day. 30 Cap 3     No current facility-administered medications for this visit.        Allergies   Allergen Reactions   • Compazine    • Pcn [Penicillins]    • Sulfa Drugs   "      ROS  Constitutional: Negative. Negative for fever, chills, weight loss, malaise/fatigue and diaphoresis.   HENT: Negative. Negative for hearing loss, ear pain, nosebleeds, congestion, sore throat, neck pain, tinnitus and ear discharge.   Respiratory: Negative. Negative for cough, hemoptysis, sputum production, shortness of breath, wheezing and stridor.   Cardiovascular: Negative. Negative for chest pain, palpitations, orthopnea, claudication, leg swelling and PND.   Gastrointestinal: Denies nausea, vomiting, diarrhea, constipation, heartburn, melena or hematochezia.  Genitourinary: Denies dysuria, hematuria, urinary incontinence, frequency or urgency.        Objective:     Blood pressure 124/78, pulse 77, temperature 36.4 °C (97.6 °F), resp. rate 16, height 1.651 m (5' 5\"), weight 75.3 kg (166 lb), last menstrual period 03/01/1993, SpO2 93 %. Body mass index is 27.62 kg/m².    Physical Exam:  Vitals reviewed.  Constitutional: Oriented to person, place, and time. appears well-developed and well-nourished. No distress.   Cardiovascular: Normal rate, regular rhythm, normal heart sounds and intact distal pulses. Exam reveals no gallop and no friction rub. No murmur heard. No carotid bruits.   Pulmonary/Chest: Effort normal and breath sounds normal. No stridor. No respiratory distress. no wheezes or rales. exhibits no tenderness.   Musculoskeletal: Normal range of motion. exhibits no edema. osbaldo pedal pulses 2+.  Lymphadenopathy: No cervical or supraclavicular adenopathy.   Neurological: Alert and oriented to person, place, and time. exhibits normal muscle tone.  Skin: Skin is warm and dry. No diaphoresis.   Psychiatric: Normal mood and affect. Behavior is normal.      Assessment and Plan:     The following treatment plan was discussed:    1. Essential hypertension      well controlled.  plan f/u 6 months.  call for lab slip   2. History of basal cell cancer  REFERRAL TO DERMATOLOGY    refer back to [facility]   3. " Vitamin D deficiency  vitamin D, Ergocalciferol, (DRISDOL) 34204 units Cap capsule    redo ergocalciferol then d3 2000 unit sdialy   4. Hyperlipidemia LDL goal <100      stable on meds   5. IFG (impaired fasting glucose)      much improved.  continue low carb diet and regular exercise.    6. Need for influenza vaccination  INFLUENZA VACCINE, HIGH DOSE (65+ ONLY)   7. Encounter for screening mammogram for malignant neoplasm of breast  MA-SCREEN MAMMO W/CAD-BILAT         Followup: Return in about 6 months (around 4/30/2018).

## 2017-11-11 RX ORDER — OMEPRAZOLE 20 MG/1
CAPSULE, DELAYED RELEASE ORAL
Qty: 90 CAP | Refills: 2 | Status: SHIPPED | OUTPATIENT
Start: 2017-11-11 | End: 2018-12-04 | Stop reason: SDUPTHER

## 2018-01-19 ENCOUNTER — HOSPITAL ENCOUNTER (OUTPATIENT)
Dept: RADIOLOGY | Facility: MEDICAL CENTER | Age: 73
End: 2018-01-19
Attending: NURSE PRACTITIONER
Payer: MEDICARE

## 2018-01-19 DIAGNOSIS — Z12.31 SCREENING MAMMOGRAM, ENCOUNTER FOR: ICD-10-CM

## 2018-01-19 PROCEDURE — 77067 SCR MAMMO BI INCL CAD: CPT

## 2018-03-08 ENCOUNTER — OFFICE VISIT (OUTPATIENT)
Dept: MEDICAL GROUP | Facility: MEDICAL CENTER | Age: 73
End: 2018-03-08
Payer: MEDICARE

## 2018-03-08 VITALS
HEART RATE: 77 BPM | HEIGHT: 65 IN | DIASTOLIC BLOOD PRESSURE: 72 MMHG | SYSTOLIC BLOOD PRESSURE: 126 MMHG | OXYGEN SATURATION: 95 % | TEMPERATURE: 98.4 F | BODY MASS INDEX: 28.99 KG/M2 | WEIGHT: 174 LBS | RESPIRATION RATE: 16 BRPM

## 2018-03-08 DIAGNOSIS — M79.672 LEFT FOOT PAIN: ICD-10-CM

## 2018-03-08 PROCEDURE — 99214 OFFICE O/P EST MOD 30 MIN: CPT | Performed by: PHYSICIAN ASSISTANT

## 2018-03-08 ASSESSMENT — PATIENT HEALTH QUESTIONNAIRE - PHQ9: CLINICAL INTERPRETATION OF PHQ2 SCORE: 0

## 2018-03-08 ASSESSMENT — PAIN SCALES - GENERAL: PAINLEVEL: 8=MODERATE-SEVERE PAIN

## 2018-03-08 NOTE — ASSESSMENT & PLAN NOTE
2 nights ago left foot pain woke her from sleep. Earlier that day had been fine. Actually exercising much less than she had been in the past - used to do a lot of walking but stopped over the winter d/t colds and flu's. Months ago hit her foot twice a work, doesn't think it is related. Pain was and is lateral sides and then going up on her toes hurts and a little on the heel. Hx of plantar fasciitis 7 years ago. No visible swelling, redness, bruising. No hx of gout. No new shoes. Only difference is actually walking less. 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 Used ibuprofen and THC cream which helped.

## 2018-03-09 NOTE — PROGRESS NOTES
Subjective:   Mandie Juan is a 72 y.o. female here today for same day access for left foot pain    Left foot pain  2 nights ago left foot pain woke her from sleep. Earlier that day had been fine. Actually exercising much less than she had been in the past - used to do a lot of walking but stopped over the winter d/t colds and flu's. Months ago hit her foot twice a work, doesn't think it is related. Pain was and is lateral sides and then going up on her toes hurts and a little on the heel. Hx of plantar fasciitis 7 years ago. No visible swelling, redness, bruising. No hx of gout. No new shoes. Only difference is actually walking less. 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 Used ibuprofen and THC cream which helped.       Current medicines (including changes today)  Current Outpatient Prescriptions   Medication Sig Dispense Refill   • Diclofenac Sodium 1 % Gel Apply 1 Application to skin as directed 2 Times a Day. 1 Tube 1   • atorvastatin (LIPITOR) 10 MG Tab TAKE ONE (1) TABLET BY MOUTH EVERY DAY 90 Tab 2   • losartan-hydrochlorothiazide (HYZAAR) 50-12.5 MG per tablet TAKE ONE (1) TABLET BY MOUTH EVERY DAY 90 Tab 3   • omeprazole (PRILOSEC) 20 MG delayed-release capsule TAKE ONE (1) CAPSULE BY MOUTH EVERY DAY 90 Cap 2   • fluticasone (FLONASE) 50 MCG/ACT nasal spray Spray 1 Spray in nose every day. 16 g 1     No current facility-administered medications for this visit.      She  has a past medical history of Allergy; Anxiety; Basal cell carcinoma (2004); Cervical polyp; Colon polyps; Hyperlipidemia; Impaired fasting glucose; Internal hemorrhoids; Kidney stones; and Vitamin d deficiency. She also has no past medical history of Breast cancer (CMS-[facility]) or Clotting disorder (CMS-[facility]).    ROS   No fever/chills. No weight change. No headache/dizziness. No focal weakness. No sore throat, nasal congestion, ear pain. No chest pain, no shortness of breath, difficulty  "breathing. No n/v/d/c or abdominal pain. No urinary complaint. No rash or skin lesion. No other joint pain or redness     Objective:     Blood pressure 126/72, pulse 77, temperature 36.9 °C (98.4 °F), resp. rate 16, height 1.651 m (5' 5\"), weight 78.9 kg (174 lb), last menstrual period 03/01/1993, SpO2 95 %. Body mass index is 28.96 kg/m².   Physical Exam:  Constitutional: WDWN, NAD  Skin: Warm, dry, good turgor, no rashes in visible areas.  Foot inspected. Normal appearance without swelling, deformity, bruising. No ttp. Slight pain with standing going up on the ball of her foot  Psych: Alert and oriented x3, normal affect and mood.        Assessment and Plan:   The following treatment plan was discussed    1. Left foot pain  Tendonitis vs arthritis. Rest, ice, ibuprofen. Xray to r/o stress fx. Diclofenac gel. F/u prn  - DX-FOOT-COMPLETE 3+ LEFT; Future  - Diclofenac Sodium 1 % Gel; Apply 1 Application to skin as directed 2 Times a Day.  Dispense: 1 Tube; Refill: 1      Followup: Return for with PCP.         "

## 2018-03-10 RX ORDER — ATORVASTATIN CALCIUM 10 MG/1
TABLET, FILM COATED ORAL
Qty: 90 TAB | Refills: 0 | Status: SHIPPED | OUTPATIENT
Start: 2018-03-10 | End: 2018-06-17 | Stop reason: SDUPTHER

## 2018-04-29 RX ORDER — LOSARTAN POTASSIUM AND HYDROCHLOROTHIAZIDE 12.5; 5 MG/1; MG/1
TABLET ORAL
Qty: 90 TAB | Refills: 0 | Status: SHIPPED | OUTPATIENT
Start: 2018-04-29 | End: 2018-07-21 | Stop reason: SDUPTHER

## 2018-05-01 ENCOUNTER — OFFICE VISIT (OUTPATIENT)
Dept: MEDICAL GROUP | Facility: MEDICAL CENTER | Age: 73
End: 2018-05-01
Payer: MEDICARE

## 2018-05-01 VITALS
OXYGEN SATURATION: 96 % | HEART RATE: 80 BPM | TEMPERATURE: 98.8 F | BODY MASS INDEX: 28.66 KG/M2 | HEIGHT: 65 IN | SYSTOLIC BLOOD PRESSURE: 124 MMHG | WEIGHT: 172 LBS | DIASTOLIC BLOOD PRESSURE: 80 MMHG

## 2018-05-01 DIAGNOSIS — R73.01 IFG (IMPAIRED FASTING GLUCOSE): ICD-10-CM

## 2018-05-01 DIAGNOSIS — E55.9 VITAMIN D DEFICIENCY: ICD-10-CM

## 2018-05-01 DIAGNOSIS — M79.605 PAIN OF LEFT LOWER EXTREMITY: ICD-10-CM

## 2018-05-01 PROCEDURE — 99214 OFFICE O/P EST MOD 30 MIN: CPT | Performed by: NURSE PRACTITIONER

## 2018-05-01 NOTE — PROGRESS NOTES
Subjective:     Mandie Juan is a 72 y.o. female who presents with left foot pain.    HPI:   Seen in f/u for left foot pain.  Months ago she hit her foot laterally.  Then several weeks ago she woke up at nite with foot cramping.  She went to [facility].  they gave her nsaid and xray order.  She never did xray since pain resolved. That was 1 month ago.    Now the pain is back again in the area of the original hit.  Pain with walking on it.    Her last vitamin d was low at oct.  She is on otc supplement.  Her last a1c was 6.2 in  Nov.  She is on low carb diet.  She just finished a cleanse - 28 day challenge.    Several days ago she noted a tender lump to the rt of her rectum on her buttock.  Was very tender.  Now resolving.        Patient Active Problem List    Diagnosis Date Noted   • Left foot pain 03/08/2018   • Adjustment disorder with anxiety 05/30/2017   • Impaired fasting glucose    • Internal hemorrhoids    • Anxiety    • Allergic rhinitis due to allergen 02/21/2014   • Basal cell carcinoma    • Vitamin D deficiency 11/03/2011   • Hyperlipidemia with target LDL less than 130 11/03/2011   • Preventative health care 05/03/2010   • Cervical polyp    • Kidney stones    • Colon polyps        Current medicines (including changes today)  Current Outpatient Prescriptions   Medication Sig Dispense Refill   • losartan-hydrochlorothiazide (HYZAAR) 50-12.5 MG per tablet TAKE ONE (1) TABLET BY MOUTH EVERY DAY 90 Tab 0   • atorvastatin (LIPITOR) 10 MG Tab TAKE ONE (1) TABLET BY MOUTH EVERY DAY 90 Tab 0   • omeprazole (PRILOSEC) 20 MG delayed-release capsule TAKE ONE (1) CAPSULE BY MOUTH EVERY DAY 90 Cap 2   • fluticasone (FLONASE) 50 MCG/ACT nasal spray Spray 1 Spray in nose every day. 16 g 1     No current facility-administered medications for this visit.        Allergies   Allergen Reactions   • Compazine    • Pcn [Penicillins]    • Sulfa Drugs        ROS  Constitutional: Negative. Negative for fever,  "chills, weight loss, malaise/fatigue and diaphoresis.   HENT: Negative. Negative for hearing loss, ear pain, nosebleeds, congestion, sore throat, neck pain, tinnitus and ear discharge.   Respiratory: Negative. Negative for cough, hemoptysis, sputum production, shortness of breath, wheezing and stridor.   Cardiovascular: Negative. Negative for chest pain, palpitations, orthopnea, claudication, leg swelling and PND.   Gastrointestinal: Denies nausea, vomiting, diarrhea, constipation, heartburn, melena or hematochezia.  Genitourinary: Denies dysuria, hematuria, urinary incontinence, frequency or urgency.        Objective:     Blood pressure 124/80, pulse 80, temperature 37.1 °C (98.8 °F), height 1.651 m (5' 5\"), weight 78 kg (172 lb), last menstrual period 03/01/1993, SpO2 96 %, not currently breastfeeding. Body mass index is 28.62 kg/m².    Physical Exam:  Vitals reviewed.  Constitutional: Oriented to person, place, and time. appears well-developed and well-nourished. No distress.   Cardiovascular: Normal rate, regular rhythm, normal heart sounds and intact distal pulses. Exam reveals no gallop and no friction rub. No murmur heard. No carotid bruits.   Pulmonary/Chest: Effort normal and breath sounds normal. No stridor. No respiratory distress. no wheezes or rales. exhibits no tenderness.   Musculoskeletal: Normal range of motion. exhibits no edema. osbaldo pedal pulses 2+.  Lymphadenopathy: No cervical or supraclavicular adenopathy.   Neurological: Alert and oriented to person, place, and time. exhibits normal muscle tone.  Skin: Skin is warm and dry. No diaphoresis.   Psychiatric: Normal mood and affect. Behavior is normal.   1 mm round firm nontender nodule note onrt buttock near rectum.  No reddness or dg.       Assessment and Plan:     The following treatment plan was discussed:    1. Pain of left lower extremity  DX-FOOT-COMPLETE 3+ LEFT    do xray and f/u with pt with results   2. Vitamin D deficiency  VITAMIN D,25 " HYDROXY    check lab and f/u for review   3. IFG (impaired fasting glucose)  HEMOGLOBIN A1C    recheck a1c.  f/u for review         Followup: Return in about 3 months (around 8/1/2018).

## 2018-06-17 RX ORDER — ATORVASTATIN CALCIUM 10 MG/1
TABLET, FILM COATED ORAL
Qty: 90 TAB | Refills: 1 | Status: SHIPPED | OUTPATIENT
Start: 2018-06-17 | End: 2018-12-04 | Stop reason: SDUPTHER

## 2018-07-23 RX ORDER — LOSARTAN POTASSIUM AND HYDROCHLOROTHIAZIDE 12.5; 5 MG/1; MG/1
TABLET ORAL
Qty: 90 TAB | Refills: 0 | Status: SHIPPED | OUTPATIENT
Start: 2018-07-23 | End: 2018-11-07 | Stop reason: SDUPTHER

## 2018-09-18 ENCOUNTER — OFFICE VISIT (OUTPATIENT)
Dept: MEDICAL GROUP | Facility: MEDICAL CENTER | Age: 73
End: 2018-09-18
Payer: MEDICARE

## 2018-09-18 VITALS
BODY MASS INDEX: 28.46 KG/M2 | WEIGHT: 171 LBS | OXYGEN SATURATION: 98 % | SYSTOLIC BLOOD PRESSURE: 134 MMHG | TEMPERATURE: 98.2 F | DIASTOLIC BLOOD PRESSURE: 82 MMHG | HEART RATE: 77 BPM

## 2018-09-18 DIAGNOSIS — I10 HYPERTENSION, ESSENTIAL: ICD-10-CM

## 2018-09-18 DIAGNOSIS — E55.9 VITAMIN D DEFICIENCY: ICD-10-CM

## 2018-09-18 DIAGNOSIS — F41.8 DEPRESSION WITH ANXIETY: ICD-10-CM

## 2018-09-18 DIAGNOSIS — E78.5 HYPERLIPIDEMIA WITH TARGET LDL LESS THAN 130: ICD-10-CM

## 2018-09-18 PROCEDURE — 99214 OFFICE O/P EST MOD 30 MIN: CPT | Performed by: NURSE PRACTITIONER

## 2018-09-18 RX ORDER — CLINDAMYCIN HYDROCHLORIDE 150 MG/1
CAPSULE ORAL
COMMUNITY
Start: 2018-09-14 | End: 2018-12-04

## 2018-09-18 NOTE — PROGRESS NOTES
Subjective:     Mandie Juan is a 72 y.o. female who presents with depression.    HPI:   Seen in f/u for depression. Her  has melanoma with mets to brain.  He has had surgery and radiation.   She is under a lot of stress with that.  Crying all the time.   is still working.     is more angry with the changes in his brain.  She is has lots of support.  She is not on meds for depression.  She is having some difficulty with memory with all the stress.   Her last d was low.  She is on otc supplement.   She is due f/u for a1c and chol.  Not on meds for DM.  Stable on chol med.  She is on meds for bp.  Her bp is stable and controlled.       Patient Active Problem List    Diagnosis Date Noted   • Depression with anxiety    • Impaired fasting glucose    • Internal hemorrhoids    • Allergic rhinitis due to allergen 02/21/2014   • Basal cell carcinoma    • Vitamin D deficiency 11/03/2011   • Hyperlipidemia with target LDL less than 130 11/03/2011   • Preventative health care 05/03/2010   • Cervical polyp    • Kidney stones    • Colon polyps        Current medicines (including changes today)  Current Outpatient Prescriptions   Medication Sig Dispense Refill   • clindamycin (CLEOCIN) 150 MG Cap      • Acetaminophen (TYLENOL 8 HOUR PO) Take  by mouth.     • sertraline (ZOLOFT) 50 MG Tab Take 1 Tab by mouth every day. 30 Tab 1   • losartan-hydrochlorothiazide (HYZAAR) 50-12.5 MG per tablet TAKE ONE (1) TABLET BY MOUTH EVERY DAY 90 Tab 0   • atorvastatin (LIPITOR) 10 MG Tab TAKE ONE (1) TABLET BY MOUTH EVERY DAY 90 Tab 1   • omeprazole (PRILOSEC) 20 MG delayed-release capsule TAKE ONE (1) CAPSULE BY MOUTH EVERY DAY 90 Cap 2   • fluticasone (FLONASE) 50 MCG/ACT nasal spray Spray 1 Spray in nose every day. 16 g 1     No current facility-administered medications for this visit.        Allergies   Allergen Reactions   • Compazine    • Pcn [Penicillins]    • Sulfa Drugs        ROS  HENT: Negative. Negative for  hearing loss, ear pain, nosebleeds, congestion, sore throat, neck pain, tinnitus and ear discharge.   Respiratory: Negative. Negative for cough, hemoptysis, sputum production, shortness of breath, wheezing and stridor.   Cardiovascular: Negative. Negative for chest pain, palpitations, orthopnea, claudication, leg swelling and PND.        Objective:     Blood pressure 134/82, pulse 77, temperature 36.8 °C (98.2 °F), weight 77.6 kg (171 lb), last menstrual period 03/01/1993, SpO2 98 %. Body mass index is 28.46 kg/m².    Physical Exam:  Vitals reviewed.  Constitutional: Oriented to person, place, and time. appears well-developed and well-nourished. No distress.   Cardiovascular: Normal rate, regular rhythm, normal heart sounds and intact distal pulses. Exam reveals no gallop and no friction rub. No murmur heard. No carotid bruits.   Pulmonary/Chest: Effort normal and breath sounds normal. No stridor. No respiratory distress. no wheezes or rales. exhibits no tenderness.   Musculoskeletal: Normal range of motion. exhibits no edema. osbaldo pedal pulses 2+.  Lymphadenopathy: No cervical or supraclavicular adenopathy.   Neurological: Alert and oriented to person, place, and time. exhibits normal muscle tone.  Skin: Skin is warm and dry. No diaphoresis.   Psychiatric: Normal mood and affect. Behavior is normal.      Assessment and Plan:     The following treatment plan was discussed:    1. Depression with anxiety  sertraline (ZOLOFT) 50 MG Tab    start zoloft.  continue counseling.  f/u 1 month for HRA and sx eval   2. Vitamin D deficiency  MICROALBUMIN CREAT RATIO URINE    continue otc supplement.  do lab and f/u for review   3. Hyperlipidemia with target LDL less than 130  COMP METABOLIC PANEL    LIPID PROFILE   4. Hypertension, essential      check lab and f/u for review.  bp stable.          Followup: Return in about 4 weeks (around 10/16/2018).

## 2018-10-16 ENCOUNTER — TELEPHONE (OUTPATIENT)
Dept: MEDICAL GROUP | Facility: MEDICAL CENTER | Age: 73
End: 2018-10-16

## 2018-10-16 NOTE — TELEPHONE ENCOUNTER
1. Caller Name: Mandie                                           Call Back Number: [phone] (home)         Patient approves a detailed voicemail message: N\A    Pt was on her way out, pt would like to call me back to complete PVP and possibly reschedule appt

## 2018-10-16 NOTE — TELEPHONE ENCOUNTER
ANNUAL WELLNESS VISIT PRE-VISIT PLANNING WITHOUT OUTREACH    1.  Reviewed note from last office visit with PCP: YES    2.  If any orders were placed at last visit, do we have Results/Consult Notes?        •  Labs - Labs ordered, NOT completed. Patient advised to complete prior to next appointment.  Note: If patient appointment is for lab review and patient did not complete labs, check with provider if OK to reschedule patient until labs completed.       •  Imaging - Imaging was not ordered at last office visit.       •  Referrals - No referrals were ordered at last office visit.    3.  Immunizations were updated in Epic using WebIZ?: Yes       •  WebIZ Recommendations: FLU, TD and SHINGRIX (Shingles)        •  Is patient due for Tdap? NO       •  Is patient due for Shingles? YES. Patient was not notified of copay/out of pocket cost.     4.  Patient is due for the following Health Maintenance Topics:   Health Maintenance Due   Topic Date Due   • IMM ZOSTER VACCINES (1 of 2) 10/04/1993   • Annual Wellness Visit  02/22/2015   • IMM INFLUENZA (1) 09/01/2018

## 2018-10-19 NOTE — TELEPHONE ENCOUNTER
1. Caller Name: Mandie                                           Call Back Number: [phone] (home)         Patient approves a detailed voicemail message: N\A    Contacted pt, pt was driving and would like to call me back to complete PVP

## 2018-10-30 ENCOUNTER — TELEPHONE (OUTPATIENT)
Dept: MEDICAL GROUP | Facility: MEDICAL CENTER | Age: 73
End: 2018-10-30

## 2018-10-30 NOTE — TELEPHONE ENCOUNTER
ANNUAL WELLNESS VISIT PRE-VISIT PLANNING WITH OUTREACH    1.  If any orders were placed at last visit, do we have Results/Consult Notes?        •  Labs - Labs ordered, NOT completed. Patient advised to complete prior to next appointment.  Note: If patient appointment is for lab review and patient did not complete labs, check with provider if OK to reschedule patient until labs completed.       •  Imaging - Imaging was not ordered at last office visit.       •  Referrals - No referrals were ordered at last office visit.    2.  Immunizations were updated in Epic using WebIZ?:Epic matches WebIZ       •  WebIZ Recommendations: FLU and SHINGRIX (Shingles)       •  Is patient due for Tdap? NO       •  Is patient due for Shingles?YES. Patient was not notified of copay/out of pocket cost.    3.  Patient has the following Care Path diagnoses on Problem List:  DEPRESSION     Is patient seeing a counselor, psychiatrist or other healthcare provider regarding their mental health? Unknown, pt to complete at her appointment    Depression Screen (PHQ-2/PHQ-9) 2/1/2016 3/8/2017 3/8/2018   PHQ-2 Total Score 0 - -   PHQ-2 Total Score - 0 0       Interpretation of PHQ-9 Total Score   Score Severity   1-4 No Depression   5-9 Mild Depression   10-14 Moderate Depression   15-19 Moderately Severe Depression   20-27 Severe Depression

## 2018-11-13 ENCOUNTER — HOSPITAL ENCOUNTER (OUTPATIENT)
Dept: LAB | Facility: MEDICAL CENTER | Age: 73
End: 2018-11-13
Attending: NURSE PRACTITIONER
Payer: MEDICARE

## 2018-11-13 DIAGNOSIS — E78.5 HYPERLIPIDEMIA WITH TARGET LDL LESS THAN 130: ICD-10-CM

## 2018-11-13 DIAGNOSIS — E55.9 VITAMIN D DEFICIENCY: ICD-10-CM

## 2018-11-13 DIAGNOSIS — R73.01 IFG (IMPAIRED FASTING GLUCOSE): ICD-10-CM

## 2018-11-13 LAB
25(OH)D3 SERPL-MCNC: 25 NG/ML (ref 30–100)
ALBUMIN SERPL BCP-MCNC: 4.1 G/DL (ref 3.2–4.9)
ALBUMIN/GLOB SERPL: 1.6 G/DL
ALP SERPL-CCNC: 68 U/L (ref 30–99)
ALT SERPL-CCNC: 25 U/L (ref 2–50)
ANION GAP SERPL CALC-SCNC: 5 MMOL/L (ref 0–11.9)
AST SERPL-CCNC: 18 U/L (ref 12–45)
BILIRUB SERPL-MCNC: 0.5 MG/DL (ref 0.1–1.5)
BUN SERPL-MCNC: 21 MG/DL (ref 8–22)
CALCIUM SERPL-MCNC: 9.5 MG/DL (ref 8.5–10.5)
CHLORIDE SERPL-SCNC: 103 MMOL/L (ref 96–112)
CHOLEST SERPL-MCNC: 204 MG/DL (ref 100–199)
CO2 SERPL-SCNC: 29 MMOL/L (ref 20–33)
CREAT SERPL-MCNC: 0.75 MG/DL (ref 0.5–1.4)
CREAT UR-MCNC: 157.9 MG/DL
EST. AVERAGE GLUCOSE BLD GHB EST-MCNC: 146 MG/DL
FASTING STATUS PATIENT QL REPORTED: NORMAL
GLOBULIN SER CALC-MCNC: 2.6 G/DL (ref 1.9–3.5)
GLUCOSE SERPL-MCNC: 103 MG/DL (ref 65–99)
HBA1C MFR BLD: 6.7 % (ref 0–5.6)
HDLC SERPL-MCNC: 76 MG/DL
LDLC SERPL CALC-MCNC: 109 MG/DL
MICROALBUMIN UR-MCNC: <0.7 MG/DL
MICROALBUMIN/CREAT UR: NORMAL MG/G (ref 0–30)
POTASSIUM SERPL-SCNC: 4.1 MMOL/L (ref 3.6–5.5)
PROT SERPL-MCNC: 6.7 G/DL (ref 6–8.2)
SODIUM SERPL-SCNC: 137 MMOL/L (ref 135–145)
TRIGL SERPL-MCNC: 97 MG/DL (ref 0–149)

## 2018-11-13 PROCEDURE — 36415 COLL VENOUS BLD VENIPUNCTURE: CPT

## 2018-11-13 PROCEDURE — 80053 COMPREHEN METABOLIC PANEL: CPT

## 2018-11-13 PROCEDURE — 82306 VITAMIN D 25 HYDROXY: CPT

## 2018-11-13 PROCEDURE — 82570 ASSAY OF URINE CREATININE: CPT

## 2018-11-13 PROCEDURE — 80061 LIPID PANEL: CPT

## 2018-11-13 PROCEDURE — 82043 UR ALBUMIN QUANTITATIVE: CPT

## 2018-11-13 PROCEDURE — 83036 HEMOGLOBIN GLYCOSYLATED A1C: CPT

## 2018-12-04 ENCOUNTER — OFFICE VISIT (OUTPATIENT)
Dept: MEDICAL GROUP | Facility: MEDICAL CENTER | Age: 73
End: 2018-12-04
Payer: MEDICARE

## 2018-12-04 VITALS
BODY MASS INDEX: 27.99 KG/M2 | WEIGHT: 168 LBS | DIASTOLIC BLOOD PRESSURE: 80 MMHG | OXYGEN SATURATION: 95 % | HEART RATE: 72 BPM | SYSTOLIC BLOOD PRESSURE: 130 MMHG | TEMPERATURE: 95.5 F | HEIGHT: 65 IN

## 2018-12-04 DIAGNOSIS — N84.1 CERVICAL POLYP: ICD-10-CM

## 2018-12-04 DIAGNOSIS — K21.9 GASTROESOPHAGEAL REFLUX DISEASE WITHOUT ESOPHAGITIS: ICD-10-CM

## 2018-12-04 DIAGNOSIS — I10 ESSENTIAL HYPERTENSION: ICD-10-CM

## 2018-12-04 DIAGNOSIS — D12.6 ADENOMATOUS POLYP OF COLON, UNSPECIFIED PART OF COLON: ICD-10-CM

## 2018-12-04 DIAGNOSIS — Z12.31 ENCOUNTER FOR SCREENING MAMMOGRAM FOR MALIGNANT NEOPLASM OF BREAST: ICD-10-CM

## 2018-12-04 DIAGNOSIS — E78.5 HYPERLIPIDEMIA WITH TARGET LDL LESS THAN 130: ICD-10-CM

## 2018-12-04 DIAGNOSIS — C44.311 BASAL CELL CARCINOMA (BCC) OF SKIN OF NOSE: ICD-10-CM

## 2018-12-04 DIAGNOSIS — F41.8 DEPRESSION WITH ANXIETY: ICD-10-CM

## 2018-12-04 DIAGNOSIS — N95.9 POST MENOPAUSAL PROBLEMS: ICD-10-CM

## 2018-12-04 DIAGNOSIS — K64.8 INTERNAL HEMORRHOIDS: ICD-10-CM

## 2018-12-04 DIAGNOSIS — E55.9 VITAMIN D DEFICIENCY: ICD-10-CM

## 2018-12-04 DIAGNOSIS — R73.01 IMPAIRED FASTING GLUCOSE: ICD-10-CM

## 2018-12-04 DIAGNOSIS — N20.0 KIDNEY STONES: ICD-10-CM

## 2018-12-04 DIAGNOSIS — J30.1 NON-SEASONAL ALLERGIC RHINITIS DUE TO POLLEN: ICD-10-CM

## 2018-12-04 PROCEDURE — G0439 PPPS, SUBSEQ VISIT: HCPCS | Performed by: NURSE PRACTITIONER

## 2018-12-04 RX ORDER — LORAZEPAM 0.5 MG/1
0.5 TABLET ORAL EVERY 4 HOURS PRN
Qty: 2 TAB | Refills: 0 | Status: SHIPPED | OUTPATIENT
Start: 2018-12-04 | End: 2019-04-16 | Stop reason: SDUPTHER

## 2018-12-04 RX ORDER — ATORVASTATIN CALCIUM 10 MG/1
10 TABLET, FILM COATED ORAL DAILY
Qty: 90 TAB | Refills: 3 | Status: SHIPPED | OUTPATIENT
Start: 2018-12-04 | End: 2019-03-19 | Stop reason: SDUPTHER

## 2018-12-04 RX ORDER — LOSARTAN POTASSIUM AND HYDROCHLOROTHIAZIDE 12.5; 5 MG/1; MG/1
1 TABLET ORAL
Qty: 90 TAB | Refills: 1 | Status: SHIPPED | OUTPATIENT
Start: 2018-12-04 | End: 2019-08-15 | Stop reason: SDUPTHER

## 2018-12-04 RX ORDER — OMEPRAZOLE 20 MG/1
20 CAPSULE, DELAYED RELEASE ORAL
Qty: 90 CAP | Refills: 3 | Status: SHIPPED | OUTPATIENT
Start: 2018-12-04 | End: 2018-12-11 | Stop reason: SDUPTHER

## 2018-12-04 ASSESSMENT — PATIENT HEALTH QUESTIONNAIRE - PHQ9: CLINICAL INTERPRETATION OF PHQ2 SCORE: 0

## 2018-12-04 ASSESSMENT — ENCOUNTER SYMPTOMS: GENERAL WELL-BEING: FAIR

## 2018-12-04 ASSESSMENT — ACTIVITIES OF DAILY LIVING (ADL): BATHING_REQUIRES_ASSISTANCE: 0

## 2018-12-04 NOTE — ASSESSMENT & PLAN NOTE
Moh's surgery nose.  This occurred in 2003.  She is followed regulalry by derm.  Will see them in january

## 2018-12-04 NOTE — ASSESSMENT & PLAN NOTE
Her a1c is up from 6.2 to 6.5.  She has not been on a healthy diet.  Does exercise regularly. Will improve diet.  nto on meds.

## 2018-12-04 NOTE — PROGRESS NOTES
Subjective:      Mandie Juan is a 73 y.o. female who presents with HRA          HPI  Seen in f/u for HRA.    She is due mammo and dexa scan in late january.    Needs meds refilled.   Reviewed lab with pt.  GFR, alb/cr ratio, CMP is wnl.  LP shows good trg and HDL.  LDL is almost normal at 109.  Goal is <100  a1c is up to 6.7  Vitamin d is low at 25.    Vitamin D deficiency  Her vitamin d is low at 25.  She was on otc supplement but stopped when got root canal and hasn't restarted.  She will now do so.      Kidney Stones  Occurred years ago.  No current sx or tx needed    Internal hemorrhoids  Mild occas sx only.  No tx needed    Impaired fasting glucose  Her a1c is up from 6.2 to 6.5.  She has not been on a healthy diet.  Does exercise regularly. Will improve diet.  nto on meds.      Hyperlipidemia with target LDL less than 130  Stable on lipitor.  Her LDL is up to 109 with goal of <100.  Hasn't been on heathy diet d/t lots of stress with husbands illness.     Depression with anxiety  She was very depressed and crying all the time about husbands illness.  Now controlled on zoloft.  She has anxiety with flying.  Will be flying soon and needs ativan for both trips    Cervical Polyp  Removed and resolved    Colon Polyps  She is up to date on her colonoscopy.  No current sx or issues.      Basal cell carcinoma  Moh's surgery nose.  This occurred in 2003.  She is followed regulalry by derm.  Will see them in january    Allergic rhinitis due to allergen  No current sx or tx needed.     Hypertension, essential  Stable on meds.  Refilled all meds    GERD without esophagitis  Stable on meds.  Refilled meds            Patient Active Problem List    Diagnosis Date Noted   • Depression with anxiety    • Impaired fasting glucose    • Internal hemorrhoids    • Allergic rhinitis due to allergen 02/21/2014   • Basal cell carcinoma    • Vitamin D deficiency 11/03/2011   • Hyperlipidemia with target LDL less than 130 11/03/2011    • Preventative health care 05/03/2010   • Cervical polyp    • Kidney stones    • Colon polyps      Current Outpatient Prescriptions   Medication Sig Dispense Refill   • sertraline (ZOLOFT) 50 MG Tab TAKE ONE TABLET BY MOUTH EVERY DAY 30 Tab 0   • losartan-hydrochlorothiazide (HYZAAR) 50-12.5 MG per tablet TAKE ONE TABLET BY MOUTH EVERY DAY 90 Tab 3   • Acetaminophen (TYLENOL 8 HOUR PO) Take  by mouth.     • atorvastatin (LIPITOR) 10 MG Tab TAKE ONE (1) TABLET BY MOUTH EVERY DAY 90 Tab 1   • omeprazole (PRILOSEC) 20 MG delayed-release capsule TAKE ONE (1) CAPSULE BY MOUTH EVERY DAY 90 Cap 2     No current facility-administered medications for this visit.      Allergies   Allergen Reactions   • Compazine    • Pcn [Penicillins]    • Sulfa Drugs        ROS  Review of Systems   Constitutional: Negative.  Negative for fever, chills, weight loss, malaise/fatigue and diaphoresis.   HENT: Negative.  Negative for hearing loss, ear pain, nosebleeds, congestion, sore throat, neck pain, tinnitus and ear discharge.    Eyes: Negative.  Negative for blurred vision, double vision, photophobia, pain, discharge and redness.   Respiratory: Negative.  Negative for cough, hemoptysis, sputum production, shortness of breath, wheezing and stridor.    Cardiovascular: Negative.  Negative for chest pain, palpitations, orthopnea, claudication, leg swelling and PND.   Gastrointestinal: Negative.  Negative for heartburn, nausea, vomiting, abdominal pain, diarrhea, constipation, blood in stool and melena.   Genitourinary: Negative.  Negative for dysuria, urgency, frequency, incontinence, hematuria and flank pain.   Musculoskeletal: Negative.  Negative for myalgias, back pain, joint pain and falls.   Skin: Negative.  Negative for itching and rash. followed by derm  Neurological: Negative.  Negative for dizziness, tingling, tremors, sensory change, speech change, focal weakness, seizures, loss of consciousness, weakness and headaches.    Endo/Heme/Allergies: Negative.  Negative for environmental allergies and polydipsia. Does not bruise/bleed easily.   Psychiatric/Behavioral: Negative.  Negative for suicidal ideas, hallucinations, memory loss and substance abuse. The patient is not nervous/anxious and does not have insomnia.    All other systems reviewed and are negative.    No chief complaint on file.        HPI:  Mandie Juan is a 73 y.o. here for Medicare Annual Wellness Visit     Patient Active Problem List    Diagnosis Date Noted   • Depression with anxiety    • Impaired fasting glucose    • Internal hemorrhoids    • Allergic rhinitis due to allergen 02/21/2014   • Basal cell carcinoma    • Vitamin D deficiency 11/03/2011   • Hyperlipidemia with target LDL less than 130 11/03/2011   • Preventative health care 05/03/2010   • Cervical polyp    • Kidney stones    • Colon polyps        Current Outpatient Prescriptions   Medication Sig Dispense Refill   • sertraline (ZOLOFT) 50 MG Tab TAKE ONE TABLET BY MOUTH EVERY DAY 30 Tab 0   • losartan-hydrochlorothiazide (HYZAAR) 50-12.5 MG per tablet TAKE ONE TABLET BY MOUTH EVERY DAY 90 Tab 3   • Acetaminophen (TYLENOL 8 HOUR PO) Take  by mouth.     • atorvastatin (LIPITOR) 10 MG Tab TAKE ONE (1) TABLET BY MOUTH EVERY DAY 90 Tab 1   • omeprazole (PRILOSEC) 20 MG delayed-release capsule TAKE ONE (1) CAPSULE BY MOUTH EVERY DAY 90 Cap 2     No current facility-administered medications for this visit.             Current supplements as per medication list.       Allergies: Compazine; Pcn [penicillins]; and Sulfa drugs    Current social contact/activities:   1.  Walk about 5x/wk  2.  Socialize with friends  3.  Cooking  4.  Travel and skiing     She  reports that she has never smoked. She has never used smokeless tobacco. She reports that she drinks about 2.0 oz of alcohol per week . She reports that she does not use drugs.  Counseling given: Not Answered      DPA/Advanced Directive:  Patient has Advanced  Directive, Living Will and Durable Power of , but it is not on file. Instructed to bring in a copy to scan into their chart.    ROS:    Gait: Uses no assistive device  Ostomy: No  Other tubes: No  Amputations: No  Chronic oxygen use: No  Last eye exam: due now  Wears hearing aids: No   : Reports urinary leakage during the last 6 months that has somewhat interfered with their daily activities or sleep.    Screening:  Mammo and dexa.  Depression Screening    Little interest or pleasure in doing things?  0 - not at all  Feeling down, depressed , or hopeless? 0 - not at all  Patient Health Questionnaire Score: 0     If depressive symptoms identified deferred to follow up visit unless specifically addressed in assessment and plan.    Interpretation of PHQ-9 Total Score   Score Severity   1-4 No Depression   5-9 Mild Depression   10-14 Moderate Depression   15-19 Moderately Severe Depression   20-27 Severe Depression    Screening for Cognitive Impairment    Three Minute Recall (leader, season, table) 3/3    [place] clock face with all 12 numbers and set the hands to show 10 past 11.  Yes    Cognitive concerns identified deferred for follow up unless specifically addressed in assessment and plan.    Fall Risk Assessment    Has the patient had two or more falls in the last year or any fall with injury in the last year?  No    Safety Assessment    Throw rugs on floor.  Yes  Handrails on all stairs.  Yes  Good lighting in all hallways.  Yes  Difficulty hearing.  No  Patient counseled about all safety risks that were identified.    Functional Assessment ADLs    Are there any barriers preventing you from cooking for yourself or meeting nutritional needs?  No.    Are there any barriers preventing you from driving safely or obtaining transportation?  No.    Are there any barriers preventing you from using a telephone or calling for help?  No.    Are there any barriers preventing you from shopping?  No.    Are there any  barriers preventing you from taking care of your own finances?  No.    Are there any barriers preventing you from managing your medications?  No.    Are there any barriers preventing you from showering, bathing or dressing yourself?  No.    Are you currently engaging in any exercise or physical activity?  Yes.     What is your perception of your health?  Fair.      Health Maintenance Summary                IMM ZOSTER VACCINES Overdue 10/4/1993     IMM INFLUENZA Overdue 9/1/2018      Done 10/30/2017 Imm Admin: Influenza Vaccine Adult HD     Patient has more history with this topic...    MAMMOGRAM Next Due 1/19/2019      Done 1/19/2018 MA-MAMMO SCREENING BILAT W/TOMOSYNTHESIS W/CAD     Patient has more history with this topic...    BONE DENSITY Next Due 5/29/2019      Done 5/29/2014 DS-BONE DENSITY STUDY (DEXA)     Patient has more history with this topic...    Annual Wellness Visit Next Due 12/5/2019      Done 12/4/2018      Patient has more history with this topic...    COLONOSCOPY Next Due 10/22/2020      Done 10/22/2015 AMB REFERRAL TO GI FOR COLONOSCOPY    IMM DTaP/Tdap/Td Vaccine Next Due 11/30/2022      Done 11/30/2012 Imm Admin: Tdap Vaccine          Patient Care Team:  VIRAJ Del Cid as PCP - General        Social History   Substance Use Topics   • Smoking status: Never Smoker   • Smokeless tobacco: Never Used   • Alcohol use 2.0 oz/week     4 Glasses of wine per week     Family History   Problem Relation Age of Onset   • Heart Disease Mother    • Cancer Father    • Hypertension Sister    • Hypertension Brother    • Cancer Paternal Grandfather      She  has a past medical history of Allergy; Basal cell carcinoma (2004); Cervical polyp; Colon polyps; Depression with anxiety; Hyperlipidemia; Impaired fasting glucose; Internal hemorrhoids; Kidney stones; and Vitamin d deficiency. She also has no past medical history of Breast cancer (HCC) or Clotting disorder (HCC).   History reviewed. No pertinent  "surgical history.    Exam:   Blood pressure 130/80, pulse 72, temperature (!) 35.3 °C (95.5 °F), temperature source Temporal, height 1.651 m (5' 5\"), weight 76.2 kg (168 lb), last menstrual period 03/01/1993, SpO2 95 %, not currently breastfeeding. Body mass index is 27.96 kg/m².    Hearing excellent.    Dentition good  Alert, oriented in no acute distress.  Eye contact is good, speech goal directed, affect calm      Services suggested: No services needed at this time  Health Care Screening: Age-appropriate preventive services recommended by USPTF and ACIP covered by Medicare were discussed today. Services ordered if indicated and agreed upon by the patient.  Referrals offered: Community-based lifestyle interventions to reduce health risks and promote self-management and wellness, fall prevention, nutrition, physical activity, tobacco-use cessation, weight loss, and mental health services as per orders if indicated.    Discussion today about general wellness and lifestyle habits:    · Prevent falls and reduce trip hazards; Cautioned about securing or removing rugs.  · Have a working fire alarm and carbon monoxide detector;   · Engage in regular physical activity and social activities     Follow-up: 4 months.  Call for lab slip       Objective:     /80 (BP Location: Right arm, Patient Position: Sitting)   Pulse 72   Temp (!) 35.3 °C (95.5 °F) (Temporal)   Ht 1.651 m (5' 5\")   Wt 76.2 kg (168 lb)   LMP 03/01/1993   SpO2 95%   Breastfeeding? No   BMI 27.96 kg/m²      Physical Exam      Physical Exam   Vitals reviewed.  Constitutional: oriented to person, place, and time. appears well-developed and well-nourished. No distress.   HENT: Head: Normocephalic and atraumatic. Bilateral tympanic membranes wnl w/o bulging.  Right Ear: External ear normal. Left Ear: External ear normal. Nose: Nose normal.  Mouth/Throat: Oropharynx is clear and moist. No oropharyngeal exudate. osbaldo tm wnl. Eyes: Conjunctivae and EOM " are normal. Pupils are equal, round, and reactive to light. Right eye exhibits no discharge. Left eye exhibits no discharge. No scleral icterus.    Neck: Normal range of motion. Neck supple. No JVD present.   Cardiovascular: Normal rate, regular rhythm, normal heart sounds and intact distal pulses.  Exam reveals no gallop and no friction rub.  No murmur heard.  No carotid bruits   Pulmonary/Chest: Effort normal and breath sounds normal. No stridor. No respiratory distress. no wheezes or rales. exhibits no tenderness.   Abdominal: Soft. Bowel sounds are normal. exhibits no distension and no mass. No tenderness. no rebound and no guarding.   Musculoskeletal: Normal range of motion. exhibits no edema or tenderness.  osbaldo pedal pulses 2+.  Lymphadenopathy:  no cervical or supraclavicular adenopathy.   Neurological: alert and oriented to person, place, and time. has normal reflexes. displays normal reflexes. No cranial nerve deficit. exhibits normal muscle tone. Coordination normal.   Skin: Skin is warm and dry. No rash noted. no diaphoresis. No erythema. No pallor.   Psychiatric: normal mood and affect. behavior is normal.            Assessment/Plan:     1. Depression with anxiety  Annual Wellness Visit - Includes PPPS Subsequent ()    sertraline (ZOLOFT) 50 MG Tab    LORazepam (ATIVAN) 0.5 MG Tab    stable on meds.  refilled meds.  f/u 4 months.  call for lab slip   2. Vitamin D deficiency  Annual Wellness Visit - Includes PPPS Subsequent ()    restarted otc supplement d/t low d3.   2000 units daily   3. Kidney stones  Annual Wellness Visit - Includes PPPS Subsequent ()    no current sx or tx needed   4. Internal hemorrhoids  Annual Wellness Visit - Includes PPPS Subsequent ()    occas sx only.  no tx needed   5. Impaired fasting glucose  Annual Wellness Visit - Includes PPPS Subsequent ()    has been stress eating.  poor diet.  exercises regularly.  improve low carb diet.  plan recheck lab in 4  months.  call for lab slip   6. Hyperlipidemia with target LDL less than 130  Annual Wellness Visit - Includes PPPS Subsequent ()    atorvastatin (LIPITOR) 10 MG Tab    stable on meds.  LP controlled   7. Cervical polyp  Annual Wellness Visit - Includes PPPS Subsequent ()    no current sx or tx needed   8. Adenomatous polyp of colon, unspecified part of colon  Annual Wellness Visit - Includes PPPS Subsequent ()    no current sx.  continue to monitor   9. Basal cell carcinoma (BCC) of skin of nose  Annual Wellness Visit - Includes PPPS Subsequent ()    stable.  f/u with derm as sched.    10. Non-seasonal allergic rhinitis due to pollen  Annual Wellness Visit - Includes PPPS Subsequent ()    no current sx.  uses otc meds prn   11. Essential hypertension  losartan-hydrochlorothiazide (HYZAAR) 50-12.5 MG per tablet    stable on meds   12. Gastroesophageal reflux disease without esophagitis  omeprazole (PRILOSEC) 20 MG delayed-release capsule    stable on meds   13. Encounter for screening mammogram for malignant neoplasm of breast  MA-SCREEN MAMMO W/CAD-BILAT   14. Post menopausal problems  DS-BONE DENSITY STUDY (DEXA)

## 2018-12-04 NOTE — ASSESSMENT & PLAN NOTE
She was very depressed and crying all the time about husbands illness.  Now controlled on zoloft.  She has anxiety with flying.  Will be flying soon and needs ativan for both trips

## 2018-12-04 NOTE — ASSESSMENT & PLAN NOTE
Her vitamin d is low at 25.  She was on otc supplement but stopped when got root canal and hasn't restarted.  She will now do so.

## 2018-12-04 NOTE — ASSESSMENT & PLAN NOTE
Stable on lipitor.  Her LDL is up to 109 with goal of <100.  Hasn't been on heathy diet d/t lots of stress with husbands illness.

## 2019-03-19 DIAGNOSIS — E78.5 HYPERLIPIDEMIA WITH TARGET LDL LESS THAN 130: ICD-10-CM

## 2019-03-19 RX ORDER — ATORVASTATIN CALCIUM 10 MG/1
10 TABLET, FILM COATED ORAL DAILY
Qty: 90 TAB | Refills: 3 | Status: SHIPPED | OUTPATIENT
Start: 2019-03-19 | End: 2019-07-15 | Stop reason: SDUPTHER

## 2019-03-19 NOTE — TELEPHONE ENCOUNTER
100 days supply please  Was the patient seen in the last year in this department? Yes    Does patient have an active prescription for medications requested? No     Received Request Via: Pharmacy

## 2019-04-15 ENCOUNTER — TELEPHONE (OUTPATIENT)
Dept: MEDICAL GROUP | Facility: MEDICAL CENTER | Age: 74
End: 2019-04-15

## 2019-04-15 DIAGNOSIS — F41.8 DEPRESSION WITH ANXIETY: ICD-10-CM

## 2019-04-15 NOTE — TELEPHONE ENCOUNTER
lorazepam    Was the patient seen in the last year in this department? Yes    Does patient have an active prescription for medications requested? No     Received Request Via: Pharmacy

## 2019-04-16 RX ORDER — LORAZEPAM 0.5 MG/1
0.5 TABLET ORAL EVERY 4 HOURS PRN
Qty: 2 TAB | Refills: 0 | Status: SHIPPED
Start: 2019-04-16 | End: 2023-04-25 | Stop reason: SDUPTHER

## 2019-07-12 ENCOUNTER — TELEPHONE (OUTPATIENT)
Dept: MEDICAL GROUP | Facility: MEDICAL CENTER | Age: 74
End: 2019-07-12

## 2019-07-12 NOTE — TELEPHONE ENCOUNTER
ESTABLISHED PATIENT PRE-VISIT PLANNING     Patient was contacted to complete PVP.     Note: Patient will not be contacted if there is no indication to call.     1.  Reviewed notes from the last few office visits within the medical group: Yes    2.  If any orders were placed at last visit or intended to be done for this visit (i.e. 6 mos follow-up), do we have Results/Consult Notes?        •  Labs - Labs ordered, completed on 11/13/18 and results are in chart.   Note: If patient appointment is for lab review and patient did not complete labs, check with provider if OK to reschedule patient until labs completed.       •  Imaging - Imaging ordered, NOT completed. Patient advised to complete prior to next appointment.       •  Referrals - No referrals were ordered at last office visit.    3. Is this appointment scheduled as a Hospital Follow-Up? No    4.  Immunizations were updated in [employer] using WebIZ?: Yes       •  Web Iz Recommendations: SHINGRIX (Shingles)    5.  Patient is due for the following Health Maintenance Topics:   Health Maintenance Due   Topic Date Due   • IMM ZOSTER VACCINES (1 of 2) 10/04/1993   • MAMMOGRAM  01/19/2019   • BONE DENSITY  05/29/2019       - Patient plans to schedule appointment for Dexa Scan and Mammogram. Patient's  has metastatic brain cancer.    6. Orders for overdue Health Maintenance topics pended in Pre-Charting? NO    7.  AHA (MDX) form printed for Provider? YES    8.  Patient was informed to arrive 15 min prior to their scheduled appointment and bring in their medication bottles.

## 2019-07-15 DIAGNOSIS — E78.5 HYPERLIPIDEMIA WITH TARGET LDL LESS THAN 130: ICD-10-CM

## 2019-07-15 DIAGNOSIS — E55.9 VITAMIN D DEFICIENCY: ICD-10-CM

## 2019-07-15 DIAGNOSIS — R73.01 IFG (IMPAIRED FASTING GLUCOSE): ICD-10-CM

## 2019-07-15 NOTE — TELEPHONE ENCOUNTER
Pt insurance needs #100      Was the patient seen in the last year in this department? Yes    Does patient have an active prescription for medications requested? No     Received Request Via: Pharmacy

## 2019-07-16 RX ORDER — ATORVASTATIN CALCIUM 10 MG/1
10 TABLET, FILM COATED ORAL DAILY
Qty: 30 TAB | Refills: 0 | Status: SHIPPED | OUTPATIENT
Start: 2019-07-16 | End: 2019-11-06 | Stop reason: SDUPTHER

## 2019-07-30 ENCOUNTER — TELEPHONE (OUTPATIENT)
Dept: MEDICAL GROUP | Facility: MEDICAL CENTER | Age: 74
End: 2019-07-30

## 2019-07-30 NOTE — TELEPHONE ENCOUNTER
ESTABLISHED PATIENT PRE-VISIT PLANNING     Patient was NOT contacted to complete PVP.     Note: Patient will not be contacted if there is no indication to call.     1.  Reviewed notes from the last few office visits within the medical group: Yes    2.  If any orders were placed at last visit or intended to be done for this visit (i.e. 6 mos follow-up), do we have Results/Consult Notes?        •  Labs - Labs ordered for Rx refill but have not been completed at this time   Note: If patient appointment is for lab review and patient did not complete labs, check with provider if OK to reschedule patient until labs completed.       •  Imaging - Imaging ordered but not completed       •  Referrals - No referrals were ordered at last office visit.    3. Is this appointment scheduled as a Hospital Follow-Up? No    4.  Immunizations were updated in [employer] using WebIZ?: Yes       •  Web Iz Recommendations: SHINGRIX (Shingles)    5.  Patient is due for the following Health Maintenance Topics:   Health Maintenance Due   Topic Date Due   • IMM ZOSTER VACCINES (1 of 2) 10/04/1993   • MAMMOGRAM  01/19/2019   • BONE DENSITY  05/29/2019       - Patient plans to schedule appointment for Dexa Scan and Mammogram.    6. Orders for overdue Health Maintenance topics pended in Pre-Charting? NO    7.  AHA (MDX) form printed for Provider? YES    8.  Patient was NOT informed to arrive 15 min prior to their scheduled appointment and bring in their medication bottles.

## 2019-08-15 DIAGNOSIS — I10 ESSENTIAL HYPERTENSION: ICD-10-CM

## 2019-08-15 RX ORDER — LOSARTAN POTASSIUM AND HYDROCHLOROTHIAZIDE 12.5; 5 MG/1; MG/1
1 TABLET ORAL
Qty: 30 TAB | Refills: 0 | Status: SHIPPED | OUTPATIENT
Start: 2019-08-15 | End: 2019-11-06 | Stop reason: SDUPTHER

## 2019-08-15 NOTE — TELEPHONE ENCOUNTER
Pt needs 100 day supply please      Was the patient seen in the last year in this department? Yes    Does patient have an active prescription for medications requested? No     Received Request Via: Pharmacy

## 2019-08-15 NOTE — LETTER
August 15, 2019        Mandie Juan  [address]  [place] [address]        Dear Mandie:    We have received a request from your pharmacy to refill your prescription(s). After careful review of your chart, we have noted you are due for labs and a follow up appointment.  We request you call our office at [phone] at your earliest convenience and make an appointment. I have included a fasting lab order for you.    However, when patients are not followed closely by their physician, potential medication complications may go unadressed. We look forward to scheduling an appointment for you, so that we may provide you with the safest and most complete medical care.        If you have any questions or concerns, please don't hesitate to call.        Sincerely,        MANN Del Cid.    Electronically Signed

## 2019-08-15 NOTE — TELEPHONE ENCOUNTER
Gave a 30 day supply.  Will refill more at [facility] for lab review that she needs to make.  Do lab before [facility]

## 2019-08-20 ENCOUNTER — APPOINTMENT (RX ONLY)
Dept: URBAN - METROPOLITAN AREA CLINIC 35 | Facility: CLINIC | Age: 74
Setting detail: DERMATOLOGY
End: 2019-08-20

## 2019-08-20 VITALS — WEIGHT: 165 LBS | HEIGHT: 65 IN

## 2019-08-20 DIAGNOSIS — I78.8 OTHER DISEASES OF CAPILLARIES: ICD-10-CM

## 2019-08-20 DIAGNOSIS — Z71.89 OTHER SPECIFIED COUNSELING: ICD-10-CM

## 2019-08-20 DIAGNOSIS — D22 MELANOCYTIC NEVI: ICD-10-CM

## 2019-08-20 DIAGNOSIS — L81.4 OTHER MELANIN HYPERPIGMENTATION: ICD-10-CM

## 2019-08-20 DIAGNOSIS — Z85.828 PERSONAL HISTORY OF OTHER MALIGNANT NEOPLASM OF SKIN: ICD-10-CM

## 2019-08-20 DIAGNOSIS — L91.8 OTHER HYPERTROPHIC DISORDERS OF THE SKIN: ICD-10-CM

## 2019-08-20 PROBLEM — D22.71 MELANOCYTIC NEVI OF RIGHT LOWER LIMB, INCLUDING HIP: Status: ACTIVE | Noted: 2019-08-20

## 2019-08-20 PROBLEM — D22.72 MELANOCYTIC NEVI OF LEFT LOWER LIMB, INCLUDING HIP: Status: ACTIVE | Noted: 2019-08-20

## 2019-08-20 PROCEDURE — ? ADDITIONAL NOTES

## 2019-08-20 PROCEDURE — 99214 OFFICE O/P EST MOD 30 MIN: CPT

## 2019-08-20 PROCEDURE — ? COUNSELING

## 2019-08-20 PROCEDURE — ? OBSERVATION AND MEASURE

## 2019-08-20 ASSESSMENT — LOCATION SIMPLE DESCRIPTION DERM
LOCATION SIMPLE: RIGHT PRETIBIAL REGION
LOCATION SIMPLE: ABDOMEN
LOCATION SIMPLE: PLANTAR SURFACE OF RIGHT 1ST TOE
LOCATION SIMPLE: LEFT UPPER BACK
LOCATION SIMPLE: RIGHT NOSE
LOCATION SIMPLE: LEFT KNEE
LOCATION SIMPLE: RIGHT POSTERIOR UPPER ARM
LOCATION SIMPLE: LEFT 3RD TOE
LOCATION SIMPLE: LEFT FOREARM
LOCATION SIMPLE: CHEST

## 2019-08-20 ASSESSMENT — LOCATION DETAILED DESCRIPTION DERM
LOCATION DETAILED: RIGHT LATERAL PLANTAR 1ST TOE
LOCATION DETAILED: LEFT MEDIAL KNEE
LOCATION DETAILED: RIGHT NASAL ALA
LOCATION DETAILED: LEFT DORSAL 3RD TOE
LOCATION DETAILED: LEFT SUPERIOR UPPER BACK
LOCATION DETAILED: RIGHT LATERAL ABDOMEN
LOCATION DETAILED: RIGHT DISTAL PRETIBIAL REGION
LOCATION DETAILED: LOWER STERNUM
LOCATION DETAILED: RIGHT PROXIMAL POSTERIOR UPPER ARM
LOCATION DETAILED: LEFT PROXIMAL DORSAL FOREARM

## 2019-08-20 ASSESSMENT — LOCATION ZONE DERM
LOCATION ZONE: TRUNK
LOCATION ZONE: TOE
LOCATION ZONE: ARM
LOCATION ZONE: LEG
LOCATION ZONE: NOSE

## 2019-08-20 NOTE — PROCEDURE: OBSERVATION
Body Location Override (Optional - Billing Will Still Be Based On Selected Body Map Location If Applicable): left medial 3rd toe
Morphology Per Location (Optional): flesh toned papule
Detail Level: Detailed
Size Of Lesion: 8 mm
Size Of Lesion: 2 mm
Morphology Per Location (Optional): brown macule
Size Of Lesion: 1 cm
Morphology Per Location (Optional): tan macule, present for decades per patient

## 2019-08-27 ENCOUNTER — TELEPHONE (OUTPATIENT)
Dept: MEDICAL GROUP | Facility: MEDICAL CENTER | Age: 74
End: 2019-08-27

## 2019-08-27 NOTE — TELEPHONE ENCOUNTER
ANNUAL WELLNESS VISIT PRE-VISIT PLANNING WITHOUT OUTREACH    1.  Reviewed note from last office visit with PCP: YES    2.  If any orders were placed at last visit, do we have Results/Consult Notes?        •  Labs - Labs ordered, NOT completed. Patient advised to complete prior to next appointment.  Note: If patient appointment is for lab review and patient did not complete labs, check with provider if OK to reschedule patient until labs completed.       •  Imaging - Imaging was not ordered at last office visit.       •  Referrals - No referrals were ordered at last office visit.    3.  Immunizations were updated in [facility] using WebIZ?: Yes       •  WebIZ Recommendations: SHINGRIX (Shingles)        •  Is patient due for Tdap? NO       •  Is patient due for Shingrix? YES. Patient was not notified of copay/out of pocket cost.     4.  Patient is due for the following Health Maintenance Topics:   Health Maintenance Due   Topic Date Due   • IMM ZOSTER VACCINES (1 of 2) 10/04/1993   • MAMMOGRAM  01/19/2019   • BONE DENSITY  05/29/2019       5.  Reviewed/Updated the following with patient:       •   Preferred Pharmacy? NO       •   Preferred Lab? NO       •   Preferred Communication? NO       •   Allergies? NO       •   Medications? NO       •   Social History? NO       •   Family History (document living status of immediate family members and if + hx of  cancer, diabetes, hypertension, hyperlipidemia, heart attack, stroke) NO    6.  Care Team Updated:       •   DME Company (gait device, O2, CPAP, etc.): NO       •   Other Specialists (eye doctor, derm, GYN, cardiology, endo, etc): NO    7. Orders for overdue Health Maintenance topics pended in Pre-Charting? NO    8.  Patient has the following Care Path diagnoses on Problem List:  NONE    9.  Patient was not advised: “This is a free wellness visit. The provider will screen for medical conditions to help you stay healthy. If you have other concerns to address you may be asked  to discuss these at a separate visit or there may be an additional fee.”     10.  Patient was NOT informed to arrive 15 min prior to their scheduled appointment and bring in their medication bottles.

## 2019-08-27 NOTE — TELEPHONE ENCOUNTER
Left message for patient to call back regarding pre-visit planning. Please transfer call to [phone].    [employer] message sent

## 2019-09-11 ENCOUNTER — APPOINTMENT (OUTPATIENT)
Dept: MEDICAL GROUP | Facility: MEDICAL CENTER | Age: 74
End: 2019-09-11
Payer: MEDICARE

## 2019-09-25 ENCOUNTER — OFFICE VISIT (OUTPATIENT)
Dept: MEDICAL GROUP | Facility: MEDICAL CENTER | Age: 74
End: 2019-09-25
Payer: MEDICARE

## 2019-09-25 VITALS
HEIGHT: 65 IN | BODY MASS INDEX: 27.49 KG/M2 | OXYGEN SATURATION: 95 % | TEMPERATURE: 97.6 F | WEIGHT: 165 LBS | DIASTOLIC BLOOD PRESSURE: 78 MMHG | HEART RATE: 76 BPM | SYSTOLIC BLOOD PRESSURE: 116 MMHG

## 2019-09-25 DIAGNOSIS — C44.91 BASAL CELL CARCINOMA (BCC), UNSPECIFIED SITE: ICD-10-CM

## 2019-09-25 DIAGNOSIS — D12.6 ADENOMATOUS POLYP OF COLON, UNSPECIFIED PART OF COLON: ICD-10-CM

## 2019-09-25 DIAGNOSIS — N84.1 CERVICAL POLYP: ICD-10-CM

## 2019-09-25 DIAGNOSIS — Z12.31 ENCOUNTER FOR SCREENING MAMMOGRAM FOR MALIGNANT NEOPLASM OF BREAST: ICD-10-CM

## 2019-09-25 DIAGNOSIS — K21.9 GERD WITHOUT ESOPHAGITIS: ICD-10-CM

## 2019-09-25 DIAGNOSIS — E55.9 VITAMIN D DEFICIENCY: ICD-10-CM

## 2019-09-25 DIAGNOSIS — J30.1 SEASONAL ALLERGIC RHINITIS DUE TO POLLEN: ICD-10-CM

## 2019-09-25 DIAGNOSIS — I10 HYPERTENSION, ESSENTIAL: ICD-10-CM

## 2019-09-25 DIAGNOSIS — F41.8 DEPRESSION WITH ANXIETY: ICD-10-CM

## 2019-09-25 DIAGNOSIS — E78.5 HYPERLIPIDEMIA WITH TARGET LDL LESS THAN 130: ICD-10-CM

## 2019-09-25 DIAGNOSIS — Z23 NEED FOR INFLUENZA VACCINATION: ICD-10-CM

## 2019-09-25 DIAGNOSIS — N95.9 POST MENOPAUSAL PROBLEMS: ICD-10-CM

## 2019-09-25 DIAGNOSIS — R73.01 IMPAIRED FASTING GLUCOSE: ICD-10-CM

## 2019-09-25 PROCEDURE — G0008 ADMIN INFLUENZA VIRUS VAC: HCPCS | Performed by: NURSE PRACTITIONER

## 2019-09-25 PROCEDURE — 90662 IIV NO PRSV INCREASED AG IM: CPT | Performed by: NURSE PRACTITIONER

## 2019-09-25 ASSESSMENT — PATIENT HEALTH QUESTIONNAIRE - PHQ9: CLINICAL INTERPRETATION OF PHQ2 SCORE: 0

## 2019-09-25 ASSESSMENT — ENCOUNTER SYMPTOMS: GENERAL WELL-BEING: FAIR

## 2019-09-25 ASSESSMENT — ACTIVITIES OF DAILY LIVING (ADL): BATHING_REQUIRES_ASSISTANCE: 0

## 2019-09-25 NOTE — ASSESSMENT & PLAN NOTE
Her last a1c was 6.7 in 11/18.  She hadnt beenon a healthy diet d/t  illness until recently.  Not restarting diet and exercise.  She is not on meds.  Due for updated lab in 11/19

## 2019-09-25 NOTE — ASSESSMENT & PLAN NOTE
Her last colonoscopy in 2015 showed no polyps.  She has had them previously.  GI wants to repeat in 5 yrs.  That would be 2020

## 2019-09-25 NOTE — ASSESSMENT & PLAN NOTE
She started zoloft before he passed.  [place] it helped some.  We will stay on med for 1 yr and then decide where to go from there.

## 2019-09-25 NOTE — PROGRESS NOTES
Chief Complaint   Patient presents with   • Annual Wellness Visit         HPI:  Mandie is a 73 y.o. here for Medicare Annual Wellness Visit    Seen in f/u for HRA.  She is tearful today.  Just lost her .  She was having to lift her .  She did hurt her rt elbow.  During this time she was not able to exercise and wasn't eating well.  She is now walking again now with her dog.     Vitamin D deficiency  She hasn't been taking her otc d supplement but will restart.  Last d was low.     Impaired fasting glucose  Her last a1c was 6.7 in 11/18.  She hadnt beenon a healthy diet d/t  illness until recently.  Not restarting diet and exercise.  She is not on meds.  Due for updated lab in 11/19    Hypertension, essential  Stable now on meds.  Will do updated lab    Hyperlipidemia with target LDL less than 130  Stable now on lipitor.  She is due updated lab.  No s/e to med    GERD without esophagitis  Stable on prilosec prn. Controls mostly with diet.    Depression with anxiety  She started zoloft before he passed.  [place] it helped some.  We will stay on med for 1 yr and then decide where to go from there.     Colon Polyps  Her last colonoscopy in 2015 showed no polyps.  She has had them previously.  GI wants to repeat in 5 yrs.  That would be 2020    Cervical Polyp  Resolved.  No current issues or sx    Basal cell carcinoma  She just saw derm in last month.  All is good.     Allergic rhinitis due to allergen  No current sx or tx needed.        Patient Active Problem List    Diagnosis Date Noted   • Hypertension, essential    • GERD without esophagitis    • Depression with anxiety    • Impaired fasting glucose    • Internal hemorrhoids    • Allergic rhinitis due to allergen 02/21/2014   • Basal cell carcinoma    • Vitamin D deficiency 11/03/2011   • Hyperlipidemia with target LDL less than 130 11/03/2011   • Preventative health care 05/03/2010   • Cervical polyp    • Kidney stones    • Colon polyps         Current Outpatient Medications   Medication Sig Dispense Refill   • losartan-hydrochlorothiazide (HYZAAR) 50-12.5 MG per tablet Take 1 Tab by mouth every day. 30 Tab 0   • atorvastatin (LIPITOR) 10 MG Tab Take 1 Tab by mouth every day. 30 Tab 0   • omeprazole (PRILOSEC) 20 MG delayed-release capsule TAKE ONE (1) CAPSULE BY MOUTH EVERY DAY 90 Cap 2   • sertraline (ZOLOFT) 50 MG Tab Take 1 Tab by mouth every day. 90 Tab 3   • Acetaminophen (TYLENOL 8 HOUR PO) Take  by mouth.       No current facility-administered medications for this visit.         Patient is taking medications as noted in medication list.  Current supplements as per medication list.     Allergies: Compazine; Pcn [penicillins]; and Sulfa drugs    Current social contact/activities: Patient reports going to [employer] and visiting with family     Is patient current with immunizations? No, due for FLU and SHINGRIX (Shingles). Patient is interested in receiving FLU today.    She  reports that she has never smoked. She has never used smokeless tobacco. She reports that she drinks about 2.0 oz of alcohol per week. She reports that she does not use drugs.  Counseling given: Not Answered        DPA/Advanced directive: Patient has Advanced Directive, but it is not on file. Instructed to bring in a copy to scan into their chart.    ROS:    Gait: Uses no assistive device   Ostomy: No   Other tubes: No   Amputations: No   Chronic oxygen use No   Last eye exam Patient reports that she is due for it   Wears hearing aids: No   : Denies any urinary leakage during the last 6 months  Review of Systems   Constitutional: Negative.  Negative for fever, chills, weight loss, malaise/fatigue and diaphoresis.   HENT: Negative.  Negative for hearing loss, ear pain, nosebleeds, congestion, sore throat, neck pain, tinnitus and ear discharge.    Eyes: Negative.  Negative for blurred vision, double vision, photophobia, pain, discharge and redness.   Respiratory: Negative.   Negative for cough, hemoptysis, sputum production, shortness of breath, wheezing and stridor.    Cardiovascular: Negative.  Negative for chest pain, palpitations, orthopnea, claudication, leg swelling and PND.   Gastrointestinal: Negative.  Negative for heartburn, nausea, vomiting, abdominal pain, diarrhea, constipation, blood in stool and melena.   Genitourinary: Negative.  Negative for dysuria, urgency, frequency, incontinence, hematuria and flank pain.   Musculoskeletal: Negative.  Negative for myalgias, back pain, joint pain and falls.   Skin: Negative.  Negative for itching and rash.   Neurological: Negative.  Negative for dizziness, tingling, tremors, sensory change, speech change, focal weakness, seizures, loss of consciousness, weakness and headaches.   Endo/Heme/Allergies: Negative.  Negative for environmental allergies and polydipsia. Does not bruise/bleed easily.   Psychiatric/Behavioral: Negative.  Negative for suicidal ideas, hallucinations, memory loss and substance abuse. The patient is not nervous/anxious and does not have insomnia.    All other systems reviewed and are negative.    Screening:  Updated lab and flu shot.  Also needs mammo and dexa    DEPRESSION     Is patient seeing a counselor, psychiatrist or other healthcare provider regarding their mental health? No, and not interested.     Depression Screen (PHQ-2/PHQ-9) 3/8/2018 12/4/2018 9/25/2019   PHQ-2 Total Score - - -   PHQ-2 Total Score 0 0 0       Interpretation of PHQ-9 Total Score   Score Severity   1-4 No Depression   5-9 Mild Depression   10-14 Moderate Depression   15-19 Moderately Severe Depression   20-27 Severe Depression      Depression Screening    Little interest or pleasure in doing things?  0 - not at all  Feeling down, depressed, or hopeless? 0 - not at all  Patient Health Questionnaire Score: 0    If depressive symptoms identified deferred to follow up visit unless specifically addressed in assessment and  plan.    Interpretation of PHQ-9 Total Score   Score Severity   1-4 No Depression   5-9 Mild Depression   10-14 Moderate Depression   15-19 Moderately Severe Depression   20-27 Severe Depression    Screening for Cognitive Impairment    Three Minute Recall (village, kitchen, baby)   /3    [place] clock face with all 12 numbers and set the hands to show 10 past 10.       If cognitive concerns identified, deferred for follow up unless specifically addressed in assessment and plan.    Fall Risk Assessment    Has the patient had two or more falls in the last year or any fall with injury in the last year?  No  If fall risk identified, deferred for follow up unless specifically addressed in assessment and plan.    Safety Assessment    Throw rugs on floor.  Yes  Handrails on all stairs.  Yes  Good lighting in all hallways.  Yes  Difficulty hearing.  No  Patient counseled about all safety risks that were identified.    Functional Assessment ADLs    Are there any barriers preventing you from cooking for yourself or meeting nutritional needs?  No.    Are there any barriers preventing you from driving safely or obtaining transportation?  No.    Are there any barriers preventing you from using a telephone or calling for help?  No.    Are there any barriers preventing you from shopping?  No.    Are there any barriers preventing you from taking care of your own finances?  No.    Are there any barriers preventing you from managing your medications?  No.    Are there any barriers preventing you from showering, bathing or dressing yourself?  No.    Are you currently engaging in any exercise or physical activity?  Yes.  Patient reports walking daily  What is your perception of your health?  Fair.    Health Maintenance Summary                IMM ZOSTER VACCINES Overdue 10/4/1993     MAMMOGRAM Overdue 1/19/2019      Done 1/19/2018 MA-MAMMO SCREENING BILAT W/TOMOSYNTHESIS W/CAD     Patient has more history with this topic...    BONE DENSITY  "Overdue 5/29/2019      Done 5/29/2014 DS-BONE DENSITY STUDY (DEXA)     Patient has more history with this topic...    IMM INFLUENZA Overdue 9/1/2019      Done 11/23/2018 Imm Admin: Influenza Vaccine Quad Inj (Pf)     Patient has more history with this topic...    Annual Wellness Visit Next Due 12/5/2019      Done 12/4/2018      Patient has more history with this topic...    COLONOSCOPY Next Due 10/22/2020      Done 10/22/2015 AMB REFERRAL TO GI FOR COLONOSCOPY    IMM DTaP/Tdap/Td Vaccine Next Due 11/30/2022      Done 11/30/2012 Imm Admin: Tdap Vaccine          Patient Care Team:  VIRAJ Del Cid as PCP - General    Social History     Tobacco Use   • Smoking status: Never Smoker   • Smokeless tobacco: Never Used   Substance Use Topics   • Alcohol use: Yes     Alcohol/week: 2.0 oz     Types: 4 Glasses of wine per week   • Drug use: No     Family History   Problem Relation Age of Onset   • Heart Disease Mother    • Cancer Father    • Hypertension Sister    • Hypertension Brother    • Cancer Paternal Grandfather      She  has a past medical history of Allergy, Basal cell carcinoma (2004), Cervical polyp, Colon polyps, Depression with anxiety, GERD without esophagitis, Hyperlipidemia, Hypertension, essential, Impaired fasting glucose, Internal hemorrhoids, Kidney stones, and Vitamin d deficiency. She also has no past medical history of Breast cancer (HCC) or Clotting disorder (HCC).   History reviewed. No pertinent surgical history.        Exam:     /78   Pulse 76   Temp 36.4 °C (97.6 °F) (Temporal)   Ht 1.651 m (5' 5\")   Wt 74.8 kg (165 lb)   SpO2 95%  Body mass index is 27.46 kg/m².    Hearing excellent.    Dentition good  Alert, oriented in no acute distress.  Eye contact is good, speech goal directed, affect calm  Physical Exam   Vitals reviewed.  Constitutional: oriented to person, place, and time. appears well-developed and well-nourished. No distress.   HENT: Head: Normocephalic and atraumatic. " Bilateral tympanic membranes wnl w/o bulging.  Right Ear: External ear normal. Left Ear: External ear normal. Nose: Nose normal.  Mouth/Throat: Oropharynx is clear and moist. No oropharyngeal exudate. osbaldo tm wnl. Eyes: Conjunctivae and EOM are normal. Pupils are equal, round, and reactive to light. Right eye exhibits no discharge. Left eye exhibits no discharge. No scleral icterus.    Neck: Normal range of motion. Neck supple. No JVD present.   Cardiovascular: Normal rate, regular rhythm, normal heart sounds and intact distal pulses.  Exam reveals no gallop and no friction rub.  No murmur heard.  No carotid bruits   Pulmonary/Chest: Effort normal and breath sounds normal. No stridor. No respiratory distress. no wheezes or rales. exhibits no tenderness.   Abdominal: Soft. Bowel sounds are normal. exhibits no distension and no mass. No tenderness. no rebound and no guarding.   Musculoskeletal: Normal range of motion. exhibits no edema or tenderness.  osbaldo pedal pulses 2+.  Lymphadenopathy:  no cervical or supraclavicular adenopathy.   Neurological: alert and oriented to person, place, and time. has normal reflexes. displays normal reflexes. No cranial nerve deficit. exhibits normal muscle tone. Coordination normal.   Skin: Skin is warm and dry. No rash noted. no diaphoresis. No erythema. No pallor.   Psychiatric: normal mood and affect. behavior is normal.         Assessment and Plan. The following treatment and monitoring plan is recommended:    1. Depression with anxiety      having more sx and tearful in office d/t husbands recent passing. on zoloft and will stay on for another year til grief improved   2. Vitamin D deficiency  VITAMIN D,25 HYDROXY    low in past but now on otc supplement.  recheck lab and f/u 11/19 for review   3. Impaired fasting glucose  Comp Metabolic Panel    HEMOGLOBIN A1C    MICROALBUMIN CREAT RATIO URINE    not on meds.  recheck lab and f/u for review   4. Hypertension, essential  Comp  Metabolic Panel    stable on meds   5. Hyperlipidemia with target LDL less than 130  Comp Metabolic Panel    Lipid Profile    stable on meds.  not on meds.  recheck lab and f/u for review   6. GERD without esophagitis      using prilosec prn.  diet controlled   7. Adenomatous polyp of colon, unspecified part of colon      will be due colonoscopy nxt yr d/t hx of polyps   8. Cervical polyp      resolved.  not sx or tx needed   9. Basal cell carcinoma (BCC), unspecified site      stable and followed by derm   10. Seasonal allergic rhinitis due to pollen      no current sx or tx needed   11. Encounter for screening mammogram for malignant neoplasm of breast  MA-SCREEN MAMMO W/CAD-BILAT   12. Post menopausal problems  DS-BONE DENSITY STUDY (DEXA)    dexa   13. Need for influenza vaccination  INFLUENZA VACCINE, HIGH DOSE (65+ ONLY)         Services suggested: No services needed at this time  Health Care Screening recommendations as per orders if indicated.  Referrals offered: PT/OT/Nutrition counseling/Behavioral Health/Smoking cessation as per orders if indicated.    Discussion today about general wellness and lifestyle habits:    · Prevent falls and reduce trip hazards; Cautioned about securing or removing rugs.  · Have a working fire alarm and carbon monoxide detector;   · Engage in regular physical activity and social activities       Follow-up: 3 mo for lab review

## 2019-10-07 ENCOUNTER — TELEPHONE (OUTPATIENT)
Dept: MEDICAL GROUP | Facility: MEDICAL CENTER | Age: 74
End: 2019-10-07

## 2019-10-07 RX ORDER — SERTRALINE HYDROCHLORIDE 100 MG/1
100 TABLET, FILM COATED ORAL DAILY
Qty: 30 TAB | Refills: 1 | Status: SHIPPED | OUTPATIENT
Start: 2019-10-07 | End: 2019-11-06

## 2019-10-07 NOTE — TELEPHONE ENCOUNTER
Pt called asking if she can increase her zoloft. Pt states she is feeling anxious and depressed more often since her

## 2019-10-07 NOTE — TELEPHONE ENCOUNTER
Yes, she can go up to 100 mg.  i will send in 100 mg tab.  Have her f/u with me in a month to see if working.  She can take 2 of 50 mg tabs until she runs out.

## 2019-11-01 ENCOUNTER — HOSPITAL ENCOUNTER (OUTPATIENT)
Dept: LAB | Facility: MEDICAL CENTER | Age: 74
End: 2019-11-01
Attending: NURSE PRACTITIONER
Payer: MEDICARE

## 2019-11-01 DIAGNOSIS — I10 HYPERTENSION, ESSENTIAL: ICD-10-CM

## 2019-11-01 DIAGNOSIS — E78.5 HYPERLIPIDEMIA WITH TARGET LDL LESS THAN 130: ICD-10-CM

## 2019-11-01 DIAGNOSIS — E55.9 VITAMIN D DEFICIENCY: ICD-10-CM

## 2019-11-01 DIAGNOSIS — R73.01 IMPAIRED FASTING GLUCOSE: ICD-10-CM

## 2019-11-01 LAB
25(OH)D3 SERPL-MCNC: 23 NG/ML (ref 30–100)
ALBUMIN SERPL BCP-MCNC: 4.1 G/DL (ref 3.2–4.9)
ALBUMIN/GLOB SERPL: 1.7 G/DL
ALP SERPL-CCNC: 60 U/L (ref 30–99)
ALT SERPL-CCNC: 20 U/L (ref 2–50)
ANION GAP SERPL CALC-SCNC: 7 MMOL/L (ref 0–11.9)
AST SERPL-CCNC: 17 U/L (ref 12–45)
BILIRUB SERPL-MCNC: 0.5 MG/DL (ref 0.1–1.5)
BUN SERPL-MCNC: 21 MG/DL (ref 8–22)
CALCIUM SERPL-MCNC: 9 MG/DL (ref 8.5–10.5)
CHLORIDE SERPL-SCNC: 107 MMOL/L (ref 96–112)
CHOLEST SERPL-MCNC: 201 MG/DL (ref 100–199)
CO2 SERPL-SCNC: 27 MMOL/L (ref 20–33)
CREAT SERPL-MCNC: 0.74 MG/DL (ref 0.5–1.4)
CREAT UR-MCNC: 167.8 MG/DL
EST. AVERAGE GLUCOSE BLD GHB EST-MCNC: 128 MG/DL
GLOBULIN SER CALC-MCNC: 2.4 G/DL (ref 1.9–3.5)
GLUCOSE SERPL-MCNC: 95 MG/DL (ref 65–99)
HBA1C MFR BLD: 6.1 % (ref 0–5.6)
HDLC SERPL-MCNC: 76 MG/DL
LDLC SERPL CALC-MCNC: 103 MG/DL
MICROALBUMIN UR-MCNC: 0.9 MG/DL
MICROALBUMIN/CREAT UR: 5 MG/G (ref 0–30)
POTASSIUM SERPL-SCNC: 3.8 MMOL/L (ref 3.6–5.5)
PROT SERPL-MCNC: 6.5 G/DL (ref 6–8.2)
SODIUM SERPL-SCNC: 141 MMOL/L (ref 135–145)
TRIGL SERPL-MCNC: 112 MG/DL (ref 0–149)

## 2019-11-01 PROCEDURE — 82306 VITAMIN D 25 HYDROXY: CPT

## 2019-11-01 PROCEDURE — 80053 COMPREHEN METABOLIC PANEL: CPT

## 2019-11-01 PROCEDURE — 82043 UR ALBUMIN QUANTITATIVE: CPT

## 2019-11-01 PROCEDURE — 36415 COLL VENOUS BLD VENIPUNCTURE: CPT

## 2019-11-01 PROCEDURE — 82570 ASSAY OF URINE CREATININE: CPT

## 2019-11-01 PROCEDURE — 83036 HEMOGLOBIN GLYCOSYLATED A1C: CPT

## 2019-11-01 PROCEDURE — 80061 LIPID PANEL: CPT

## 2019-11-06 ENCOUNTER — OFFICE VISIT (OUTPATIENT)
Dept: MEDICAL GROUP | Facility: MEDICAL CENTER | Age: 74
End: 2019-11-06
Payer: MEDICARE

## 2019-11-06 VITALS
SYSTOLIC BLOOD PRESSURE: 120 MMHG | WEIGHT: 169 LBS | OXYGEN SATURATION: 96 % | DIASTOLIC BLOOD PRESSURE: 60 MMHG | HEART RATE: 80 BPM | BODY MASS INDEX: 28.16 KG/M2 | HEIGHT: 65 IN | TEMPERATURE: 98 F

## 2019-11-06 DIAGNOSIS — F32.1 CURRENT MODERATE EPISODE OF MAJOR DEPRESSIVE DISORDER WITHOUT PRIOR EPISODE (HCC): ICD-10-CM

## 2019-11-06 DIAGNOSIS — R73.01 IFG (IMPAIRED FASTING GLUCOSE): ICD-10-CM

## 2019-11-06 DIAGNOSIS — E78.5 HYPERLIPIDEMIA WITH TARGET LDL LESS THAN 130: ICD-10-CM

## 2019-11-06 DIAGNOSIS — I10 ESSENTIAL HYPERTENSION: ICD-10-CM

## 2019-11-06 PROCEDURE — 99214 OFFICE O/P EST MOD 30 MIN: CPT | Performed by: NURSE PRACTITIONER

## 2019-11-06 RX ORDER — LOSARTAN POTASSIUM AND HYDROCHLOROTHIAZIDE 12.5; 5 MG/1; MG/1
1 TABLET ORAL
Qty: 90 TAB | Refills: 2 | Status: SHIPPED
Start: 2019-11-06 | End: 2020-10-20

## 2019-11-06 RX ORDER — ATORVASTATIN CALCIUM 10 MG/1
10 TABLET, FILM COATED ORAL DAILY
Qty: 30 TAB | Refills: 0 | Status: SHIPPED | OUTPATIENT
Start: 2019-11-06 | End: 2019-11-06 | Stop reason: SDUPTHER

## 2019-11-06 RX ORDER — LOSARTAN POTASSIUM AND HYDROCHLOROTHIAZIDE 12.5; 5 MG/1; MG/1
1 TABLET ORAL
Qty: 30 TAB | Refills: 0 | Status: SHIPPED | OUTPATIENT
Start: 2019-11-06 | End: 2019-11-06 | Stop reason: SDUPTHER

## 2019-11-06 RX ORDER — ATORVASTATIN CALCIUM 10 MG/1
10 TABLET, FILM COATED ORAL DAILY
Qty: 90 TAB | Refills: 2 | Status: SHIPPED | OUTPATIENT
Start: 2019-11-06 | End: 2020-01-21 | Stop reason: SDUPTHER

## 2019-11-06 NOTE — PROGRESS NOTES
Subjective:     Mandie Juan is a 74 y.o. female who presents with depression.    HPI:   Seen in f/u for depression.  She was given zoloft last time at 50 mg.  She wasn't getting better and tried to inc to 100 mg. It made her too tired.  Will stay on 50 mg since still some fatigue.  Will try and inc to 75 mg as reece.  She has a new skin lesion on lt nose.  Hx of BCC on right nose.  Will go see derm.  Reviewed lab with pt.  Her a1c is improved.  Down from 6.7 to 6.1.  Not on meds.  Following healthy diet.  Alb/cr ratio, LP, CMP, GFR is wnl.  Vitamin d is low.    She needs refills on bp and chol med.  Stable on meds.  Bp is controlled.  Taking meds approp.          Patient Active Problem List    Diagnosis Date Noted   • Hypertension, essential    • GERD without esophagitis    • Depression with anxiety    • Impaired fasting glucose    • Internal hemorrhoids    • Allergic rhinitis due to allergen 02/21/2014   • Basal cell carcinoma    • Vitamin D deficiency 11/03/2011   • Hyperlipidemia with target LDL less than 130 11/03/2011   • Preventative health care 05/03/2010   • Cervical polyp    • Kidney stones    • Colon polyps        Current medicines (including changes today)  Current Outpatient Medications   Medication Sig Dispense Refill   • losartan-hydrochlorothiazide (HYZAAR) 50-12.5 MG per tablet Take 1 Tab by mouth every day. 90 Tab 2   • atorvastatin (LIPITOR) 10 MG Tab Take 1 Tab by mouth every day. 90 Tab 2   • omeprazole (PRILOSEC) 20 MG delayed-release capsule TAKE ONE (1) CAPSULE BY MOUTH EVERY DAY 90 Cap 2   • sertraline (ZOLOFT) 50 MG Tab Take 1 Tab by mouth every day. 90 Tab 3     No current facility-administered medications for this visit.        Allergies   Allergen Reactions   • Compazine    • Pcn [Penicillins]    • Sulfa Drugs        ROS  Constitutional: Negative. Negative for fever, chills, weight loss, malaise/fatigue and diaphoresis.   HENT: Negative. Negative for hearing loss, ear pain,  "nosebleeds, congestion, sore throat, neck pain, tinnitus and ear discharge.   Respiratory: Negative. Negative for cough, hemoptysis, sputum production, shortness of breath, wheezing and stridor.   Cardiovascular: Negative. Negative for chest pain, palpitations, orthopnea, claudication, leg swelling and PND.   Gastrointestinal: Denies nausea, vomiting, diarrhea, constipation, heartburn, melena or hematochezia.  Genitourinary: Denies dysuria, hematuria, urinary incontinence, frequency or urgency.        Objective:     /60 (BP Location: Right arm, Patient Position: Sitting)   Pulse 80   Temp 36.7 °C (98 °F) (Temporal)   Ht 1.651 m (5' 5\")   Wt 76.7 kg (169 lb)   SpO2 96%  Body mass index is 28.12 kg/m².    Physical Exam:  Vitals reviewed.  Constitutional: Oriented to person, place, and time. appears well-developed and well-nourished. No distress.   Cardiovascular: Normal rate, regular rhythm, normal heart sounds and intact distal pulses. Exam reveals no gallop and no friction rub. No murmur heard. No carotid bruits.   Pulmonary/Chest: Effort normal and breath sounds normal. No stridor. No respiratory distress. no wheezes or rales. exhibits no tenderness.   Musculoskeletal: Normal range of motion. exhibits no edema. osbaldo pedal pulses 2+.  Lymphadenopathy: No cervical or supraclavicular adenopathy.   Neurological: Alert and oriented to person, place, and time. exhibits normal muscle tone.  Skin: Skin is warm and dry. No diaphoresis.   Psychiatric: Normal mood and affect. Behavior is normal.      Assessment and Plan:     The following treatment plan was discussed:    1. Essential hypertension  losartan-hydrochlorothiazide (HYZAAR) 50-12.5 MG per tablet    DISCONTINUED: losartan-hydrochlorothiazide (HYZAAR) 50-12.5 MG per tablet    stable on meds   2. Hyperlipidemia with target LDL less than 130  atorvastatin (LIPITOR) 10 MG Tab    DISCONTINUED: atorvastatin (LIPITOR) 10 MG Tab    stable on meds.  LP controlled "   3. IFG (impaired fasting glucose)  HEMOGLOBIN A1C    improved with improved with healthy diet.  f/u 6 mo.  do lab before appt    4. Current moderate episode of major depressive disorder without prior episode (HCC)      still grieving for recent loss of .  continue 50 mg zoloft and inc to 75 mg as reece if sx not improving through grief process         Followup: Return in about 6 months (around 5/6/2020).

## 2019-12-04 ENCOUNTER — APPOINTMENT (RX ONLY)
Dept: URBAN - METROPOLITAN AREA CLINIC 35 | Facility: CLINIC | Age: 74
Setting detail: DERMATOLOGY
End: 2019-12-04

## 2019-12-04 DIAGNOSIS — L57.0 ACTINIC KERATOSIS: ICD-10-CM

## 2019-12-04 PROBLEM — D23.5 OTHER BENIGN NEOPLASM OF SKIN OF TRUNK: Status: ACTIVE | Noted: 2019-12-04

## 2019-12-04 PROCEDURE — ? COUNSELING

## 2019-12-04 PROCEDURE — 17000 DESTRUCT PREMALG LESION: CPT

## 2019-12-04 PROCEDURE — 17003 DESTRUCT PREMALG LES 2-14: CPT

## 2019-12-04 PROCEDURE — 99212 OFFICE O/P EST SF 10 MIN: CPT | Mod: 25

## 2019-12-04 PROCEDURE — ? LIQUID NITROGEN

## 2019-12-04 ASSESSMENT — LOCATION SIMPLE DESCRIPTION DERM: LOCATION SIMPLE: NOSE

## 2019-12-04 ASSESSMENT — LOCATION ZONE DERM: LOCATION ZONE: NOSE

## 2019-12-04 ASSESSMENT — LOCATION DETAILED DESCRIPTION DERM: LOCATION DETAILED: LEFT NASAL DORSUM

## 2019-12-04 NOTE — PROCEDURE: LIQUID NITROGEN
Detail Level: Detailed
Consent: The patient's consent was obtained including but not limited to risks of crusting, scabbing, blistering, scarring, darker or lighter pigmentary change, recurrence, incomplete removal and infection.
Render Post-Care Instructions In Note?: no
Number Of Freeze-Thaw Cycles: 2 freeze-thaw cycles
Post-Care Instructions: I reviewed with the patient in detail post-care instructions. Patient is to wear sunprotection, and avoid picking at any of the treated lesions. Pt may apply Vaseline to crusted or scabbing areas.
Duration Of Freeze Thaw-Cycle (Seconds): 2

## 2019-12-04 NOTE — HPI: SKIN LESION
Is This A New Presentation, Or A Follow-Up?: Skin Lesion
What Type Of Note Output Would You Prefer (Optional)?: Bullet Format
How Severe Is Your Skin Lesion?: mild
Has Your Skin Lesion Been Treated?: not been treated
Additional History: Lesion comes and goes.

## 2019-12-09 DIAGNOSIS — F41.8 DEPRESSION WITH ANXIETY: ICD-10-CM

## 2019-12-10 ENCOUNTER — HOSPITAL ENCOUNTER (OUTPATIENT)
Dept: RADIOLOGY | Facility: MEDICAL CENTER | Age: 74
End: 2019-12-10
Attending: NURSE PRACTITIONER
Payer: MEDICARE

## 2019-12-10 DIAGNOSIS — Z12.31 ENCOUNTER FOR SCREENING MAMMOGRAM FOR MALIGNANT NEOPLASM OF BREAST: ICD-10-CM

## 2019-12-10 DIAGNOSIS — N95.9 POST MENOPAUSAL PROBLEMS: ICD-10-CM

## 2019-12-10 PROCEDURE — 77080 DXA BONE DENSITY AXIAL: CPT

## 2019-12-10 PROCEDURE — 77063 BREAST TOMOSYNTHESIS BI: CPT

## 2019-12-16 DIAGNOSIS — I10 HYPERTENSION, ESSENTIAL: ICD-10-CM

## 2019-12-16 RX ORDER — HYDROCHLOROTHIAZIDE 12.5 MG/1
12.5 CAPSULE, GELATIN COATED ORAL DAILY
Qty: 90 CAP | Refills: 3 | Status: SHIPPED | OUTPATIENT
Start: 2019-12-16 | End: 2020-12-09 | Stop reason: SDUPTHER

## 2019-12-16 RX ORDER — LOSARTAN POTASSIUM 50 MG/1
50 TABLET ORAL DAILY
Qty: 90 TAB | Refills: 3 | Status: SHIPPED | OUTPATIENT
Start: 2019-12-16 | End: 2020-09-14 | Stop reason: SDUPTHER

## 2020-01-21 DIAGNOSIS — E78.5 HYPERLIPIDEMIA WITH TARGET LDL LESS THAN 130: ICD-10-CM

## 2020-01-21 RX ORDER — ATORVASTATIN CALCIUM 10 MG/1
10 TABLET, FILM COATED ORAL DAILY
Qty: 90 TAB | Refills: 2 | Status: SHIPPED | OUTPATIENT
Start: 2020-01-21 | End: 2020-08-17

## 2020-02-25 ENCOUNTER — PATIENT OUTREACH (OUTPATIENT)
Dept: HEALTH INFORMATION MANAGEMENT | Facility: OTHER | Age: 75
End: 2020-02-25

## 2020-02-25 NOTE — PROGRESS NOTES
Attempted to call the pt to introduce myself as her SCP . 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 She asked that I called her back at a later date. I advised that I would. If the pt calls back, please transfer to [phone].    Attempt # 1

## 2020-03-04 ENCOUNTER — TELEPHONE (OUTPATIENT)
Dept: MEDICAL GROUP | Facility: MEDICAL CENTER | Age: 75
End: 2020-03-04

## 2020-03-04 NOTE — TELEPHONE ENCOUNTER
Pt called states she is having heightened stress/depression due to her cat being in vet ER,her granddaughter is now living with her due to family drama, and she is still grieving loss of . Pt is asking what she can do to help sx

## 2020-03-05 NOTE — TELEPHONE ENCOUNTER
Pt states that she is feeling better and already sees counseling. She said she doesn't feel like she should dose up, but she is going to make an appointment and talk with you more about it.

## 2020-03-31 NOTE — PROGRESS NOTES
Called member to introduce myself as her SCP . She said that she was perfectly fine and did not need assistance. I asked if she would like me to explain the program before she declined, she said no. She requested to be put on the DNC list. I let her know that I would move her to that list so she would no longer be contacted. She had no other questions at this time. If the member calls in, please transfer to [phone]    Attempt # 3

## 2020-05-15 ENCOUNTER — HOSPITAL ENCOUNTER (OUTPATIENT)
Dept: LAB | Facility: MEDICAL CENTER | Age: 75
End: 2020-05-15
Attending: NURSE PRACTITIONER
Payer: MEDICARE

## 2020-05-15 DIAGNOSIS — E55.9 VITAMIN D DEFICIENCY: ICD-10-CM

## 2020-05-15 DIAGNOSIS — R73.01 IFG (IMPAIRED FASTING GLUCOSE): ICD-10-CM

## 2020-05-15 LAB — 25(OH)D3 SERPL-MCNC: 19 NG/ML (ref 30–100)

## 2020-05-15 PROCEDURE — 36415 COLL VENOUS BLD VENIPUNCTURE: CPT

## 2020-05-15 PROCEDURE — 83036 HEMOGLOBIN GLYCOSYLATED A1C: CPT

## 2020-05-15 PROCEDURE — 82306 VITAMIN D 25 HYDROXY: CPT

## 2020-05-16 LAB
EST. AVERAGE GLUCOSE BLD GHB EST-MCNC: 134 MG/DL
HBA1C MFR BLD: 6.3 % (ref 0–5.6)

## 2020-05-18 ENCOUNTER — OFFICE VISIT (OUTPATIENT)
Dept: MEDICAL GROUP | Facility: MEDICAL CENTER | Age: 75
End: 2020-05-18
Payer: MEDICARE

## 2020-05-18 VITALS
DIASTOLIC BLOOD PRESSURE: 80 MMHG | BODY MASS INDEX: 28.16 KG/M2 | HEART RATE: 78 BPM | TEMPERATURE: 97.6 F | HEIGHT: 65 IN | OXYGEN SATURATION: 93 % | WEIGHT: 169 LBS | SYSTOLIC BLOOD PRESSURE: 130 MMHG

## 2020-05-18 DIAGNOSIS — B49 FUNGUS INFECTION: ICD-10-CM

## 2020-05-18 DIAGNOSIS — R73.01 IFG (IMPAIRED FASTING GLUCOSE): ICD-10-CM

## 2020-05-18 DIAGNOSIS — I10 HYPERTENSION, ESSENTIAL: ICD-10-CM

## 2020-05-18 DIAGNOSIS — M67.442 MUCOUS CYST OF DIGIT OF LEFT HAND: ICD-10-CM

## 2020-05-18 DIAGNOSIS — F32.1 MAJOR DEPRESSIVE DISORDER, SINGLE EPISODE, MODERATE (HCC): ICD-10-CM

## 2020-05-18 DIAGNOSIS — M79.10 MYALGIA: ICD-10-CM

## 2020-05-18 DIAGNOSIS — E78.5 HYPERLIPIDEMIA WITH TARGET LDL LESS THAN 130: ICD-10-CM

## 2020-05-18 DIAGNOSIS — E55.9 VITAMIN D DEFICIENCY: ICD-10-CM

## 2020-05-18 PROCEDURE — 99214 OFFICE O/P EST MOD 30 MIN: CPT | Performed by: NURSE PRACTITIONER

## 2020-05-18 RX ORDER — LOSARTAN POTASSIUM AND HYDROCHLOROTHIAZIDE 12.5; 1 MG/1; MG/1
1 TABLET ORAL DAILY
Qty: 90 TAB | Refills: 3 | Status: SHIPPED
Start: 2020-05-18 | End: 2020-10-20

## 2020-05-18 RX ORDER — ERGOCALCIFEROL 1.25 MG/1
50000 CAPSULE ORAL
Qty: 12 CAP | Refills: 0 | Status: SHIPPED
Start: 2020-05-18 | End: 2020-10-20

## 2020-05-18 RX ORDER — SERTRALINE HYDROCHLORIDE 100 MG/1
100 TABLET, FILM COATED ORAL DAILY
Qty: 90 TAB | Refills: 3 | Status: SHIPPED
Start: 2020-05-18 | End: 2020-10-20

## 2020-05-18 ASSESSMENT — PATIENT HEALTH QUESTIONNAIRE - PHQ9: CLINICAL INTERPRETATION OF PHQ2 SCORE: 0

## 2020-05-18 NOTE — PROGRESS NOTES
Annual Health Assessment Questions:    1.  Are you currently engaging in any exercise or physical activity? Yes    2.  How would you describe your mood or emotional well-being today? fair    3.  Have you had any falls in the last year? No    4.  Have you noticed any problems with your balance or had difficulty walking? No    5.  In the last six months have you experienced any leakage of urine? No    6. DPA/Advanced Directive: Patient has Advanced Directive, but it is not on file. Instructed to bring in a copy to scan into their chart.

## 2020-05-18 NOTE — PROGRESS NOTES
Subjective:     Mandie Juan is a 74 y.o. female who presents with HTN.    HPI:   Seen in f/u for HTN.  She is feeling well.  Her bp meds had to be changed since out of [facility].  Now taking 2-50 mg tab losartan and 12.5 mg tab HCTZ.  Will change to losartan hct 100/12.5 i daily.  She is having leg weakness in her thighs.  This has occurred since nov.  She is on lipitor.  ?  Could this be cuasing sx.  Able to walk well w/o issues.  Sx mostly in am.    Last week she accidently double took her meds.  She had sweating and voiding all nite long.    She is having more depression from losing her .  On zoloft but not helping.  Was in grief counseling but that is on hold.  Initially after his death she was ok but now she is having periods of severe saddness that he is gone.  Having to take care of her home all by herself is stressful.  Was doing grief group but since that cannot be done now she is having more sx.  She is walking for long walks.  That is helping some.  She is on zoloft but its not helping enough.    Reviewed lab with pt.  Her a1c is sl elevated from 6.1 to 6.3.  Not on meds.  Has not been on as healthy a diet as she was on previous.   Vitamin d is down still at 19.  Not on otc supplement.   She has foot fungus.  She just found it when had to remove gel nails.  Will try diluted bleach soaks.  Not doing any further nail polish.    She has a mucous retention cyst on left index finger with warty lesion just below.  Will monitor and f/u if sx not improved.        Patient Active Problem List    Diagnosis Date Noted   • Hypertension, essential    • GERD without esophagitis    • Depression with anxiety    • Impaired fasting glucose    • Internal hemorrhoids    • Allergic rhinitis due to allergen 02/21/2014   • Basal cell carcinoma    • Vitamin D deficiency 11/03/2011   • Hyperlipidemia with target LDL less than 130 11/03/2011   • Cervical polyp    • Kidney stones    • Colon polyps        Current medicines  "(including changes today)  Current Outpatient Medications   Medication Sig Dispense Refill   • losartan-hydrochlorothiazide (HYZAAR) 100-12.5 MG per tablet Take 1 Tab by mouth every day. 90 Tab 3   • sertraline (ZOLOFT) 100 MG Tab Take 1 Tab by mouth every day. 90 Tab 3   • vitamin D, Ergocalciferol, (DRISDOL) 1.25 MG (35698 UT) Cap capsule Take 1 Cap by mouth every 7 days. 12 Cap 0   • omeprazole (PRILOSEC) 20 MG delayed-release capsule TAKE ONE CAPSULE BY MOUTH EVERY DAY 90 Cap 2   • atorvastatin (LIPITOR) 10 MG Tab Take 1 Tab by mouth every day. 90 Tab 2   • losartan (COZAAR) 50 MG Tab Take 1 Tab by mouth every day. 90 Tab 3   • hydrochlorothiazide (MICROZIDE) 12.5 MG capsule Take 1 Cap by mouth every day. 90 Cap 3   • sertraline (ZOLOFT) 50 MG Tab TAKE ONE TABLET BY MOUTH EVERY DAY 90 Tab 3   • losartan-hydrochlorothiazide (HYZAAR) 50-12.5 MG per tablet Take 1 Tab by mouth every day. 90 Tab 2     No current facility-administered medications for this visit.        Allergies   Allergen Reactions   • Compazine    • Pcn [Penicillins]    • Sulfa Drugs        ROS  Constitutional: Negative. Negative for fever, chills, weight loss, malaise/fatigue and diaphoresis.   HENT: Negative. Negative for hearing loss, ear pain, nosebleeds, congestion, sore throat, neck pain, tinnitus and ear discharge.   Respiratory: Negative. Negative for cough, hemoptysis, sputum production, shortness of breath, wheezing and stridor.   Cardiovascular: Negative. Negative for chest pain, palpitations, orthopnea, claudication, leg swelling and PND.   Gastrointestinal: Denies nausea, vomiting, diarrhea, constipation, heartburn, melena or hematochezia.  Genitourinary: Denies dysuria, hematuria, urinary incontinence, frequency or urgency.        Objective:     /80 (BP Location: Right arm, Patient Position: Sitting)   Pulse 78   Temp 36.4 °C (97.6 °F) (Temporal)   Ht 1.651 m (5' 5\")   Wt 76.7 kg (169 lb)   SpO2 93%  Body mass index is 28.12 " kg/m².    Physical Exam:  Vitals reviewed.  Constitutional: Oriented to person, place, and time. appears well-developed and well-nourished. No distress.   Cardiovascular: Normal rate, regular rhythm, normal heart sounds and intact distal pulses. Exam reveals no gallop and no friction rub. No murmur heard. No carotid bruits.   Pulmonary/Chest: Effort normal and breath sounds normal. No stridor. No respiratory distress. no wheezes or rales. exhibits no tenderness.   Musculoskeletal: Normal range of motion. exhibits no edema. osbaldo pedal pulses 2+.  Lymphadenopathy: No cervical or supraclavicular adenopathy.   Neurological: Alert and oriented to person, place, and time. exhibits normal muscle tone.  Skin: Skin is warm and dry. No diaphoresis.   Psychiatric: Normal mood and affect. Behavior is normal.      Assessment and Plan:     The following treatment plan was discussed:    1. Hypertension, essential  losartan-hydrochlorothiazide (HYZAAR) 100-12.5 MG per tablet    stable on meds.  change meds to losartan hct 100/12.5.  dc losartan 50 mg tabs and HCTZ 12.5 MG tabs.     2. Vitamin D deficiency  vitamin D, Ergocalciferol, (DRISDOL) 1.25 MG (82079 UT) Cap capsule    ergocalciferol x12 wks then d3 2000 units daily.     3. Hyperlipidemia with target LDL less than 130      having leg weakness poss d/t statin.  hold statin to see if weakness improves.  f/u with me via email on response.  restart if no change in sx.    4. Major depressive disorder, single episode, moderate (HCC)  sertraline (ZOLOFT) 100 MG Tab    having more sx of depression with grief.  inc zoloft to 100 mg daily.  f/u if sx not improved w/tx.  then f/u 6 mo.  call for lab slip   5. Myalgia      will hold lipitor.  if sx improve will change to crestor.     6. IFG (impaired fasting glucose)      a1c up sl.  not on meds.  enc pt to improve healthy diet and continue exercisse.  plan:  recheck lab 6 mo.  call for lab slip   7. Fungus infection      on toes osbaldo.   use diluted bleach soak.  consider lamisil if sx continue.   8. Mucous cyst of digit of left hand      monitor and f/u if sx not improved          Followup: Return in about 6 months (around 11/18/2020).

## 2020-05-19 ENCOUNTER — TELEPHONE (OUTPATIENT)
Dept: MEDICAL GROUP | Facility: MEDICAL CENTER | Age: 75
End: 2020-05-19

## 2020-05-19 RX ORDER — LOSARTAN POTASSIUM 100 MG/1
100 TABLET ORAL DAILY
Qty: 90 TAB | Refills: 3 | Status: SHIPPED | OUTPATIENT
Start: 2020-05-19 | End: 2020-05-27 | Stop reason: SDUPTHER

## 2020-05-19 RX ORDER — HYDROCHLOROTHIAZIDE 12.5 MG/1
12.5 CAPSULE, GELATIN COATED ORAL DAILY
Qty: 90 CAP | Refills: 3 | Status: SHIPPED
Start: 2020-05-19 | End: 2020-10-20

## 2020-05-19 NOTE — TELEPHONE ENCOUNTER
Pharmacy states losartan/potassium combo is on back order and is asking for them to be sent separate please

## 2020-05-27 RX ORDER — LOSARTAN POTASSIUM 100 MG/1
100 TABLET ORAL DAILY
Qty: 90 TAB | Refills: 3 | Status: SHIPPED
Start: 2020-05-27 | End: 2020-10-20

## 2020-06-01 ENCOUNTER — TELEPHONE (OUTPATIENT)
Dept: MEDICAL GROUP | Facility: MEDICAL CENTER | Age: 75
End: 2020-06-01

## 2020-06-03 RX ORDER — LOSARTAN POTASSIUM 50 MG/1
50 TABLET ORAL DAILY
Qty: 90 TAB | Refills: 1 | Status: SHIPPED
Start: 2020-06-03 | End: 2020-10-20

## 2020-06-03 NOTE — TELEPHONE ENCOUNTER
We had tried to get the losartan and hctz in one pill but the pharmacy notified me that all those doses are on bck order.  i had to separate the meds.  She is to take losartan 100 mg daily and HCTZ 12.5 mg daily.

## 2020-06-03 NOTE — TELEPHONE ENCOUNTER
Pt states she was and is only taking the 50mg. Pt is asking if you can send the 50mg tabs. Pt will follow up

## 2020-06-03 NOTE — TELEPHONE ENCOUNTER
She told me she was taking 2 of the 50 mg losartan at her last appt.  i just changed her med to 100 mg instead of 2-50 mg tabs.  At that time her bp was controlled.  She should continue that dose if that was what she was on.

## 2020-06-03 NOTE — TELEPHONE ENCOUNTER
Pt notified and understands the ratio. She wants to make sure she should be taking losartan 100mg daily as she previously took a dose of 50mg daily. If so, she is worried about side effects of increasing her dose.     Please advise.     Pt states okay to leave voicemail.

## 2020-08-17 DIAGNOSIS — E78.5 HYPERLIPIDEMIA WITH TARGET LDL LESS THAN 130: ICD-10-CM

## 2020-08-17 RX ORDER — ATORVASTATIN CALCIUM 10 MG/1
TABLET, FILM COATED ORAL
Qty: 90 TAB | Refills: 2 | Status: SHIPPED | OUTPATIENT
Start: 2020-08-17 | End: 2021-03-19

## 2020-08-25 ENCOUNTER — APPOINTMENT (RX ONLY)
Dept: URBAN - METROPOLITAN AREA CLINIC 4 | Facility: CLINIC | Age: 75
Setting detail: DERMATOLOGY
End: 2020-08-25

## 2020-08-25 DIAGNOSIS — Z85.828 PERSONAL HISTORY OF OTHER MALIGNANT NEOPLASM OF SKIN: ICD-10-CM

## 2020-08-25 DIAGNOSIS — M71 OTHER BURSOPATHIES: ICD-10-CM

## 2020-08-25 DIAGNOSIS — Z71.89 OTHER SPECIFIED COUNSELING: ICD-10-CM

## 2020-08-25 DIAGNOSIS — L81.4 OTHER MELANIN HYPERPIGMENTATION: ICD-10-CM

## 2020-08-25 DIAGNOSIS — L91.8 OTHER HYPERTROPHIC DISORDERS OF THE SKIN: ICD-10-CM

## 2020-08-25 DIAGNOSIS — D22 MELANOCYTIC NEVI: ICD-10-CM

## 2020-08-25 DIAGNOSIS — L30.4 ERYTHEMA INTERTRIGO: ICD-10-CM

## 2020-08-25 DIAGNOSIS — D485 NEOPLASM OF UNCERTAIN BEHAVIOR OF SKIN: ICD-10-CM

## 2020-08-25 DIAGNOSIS — L82.1 OTHER SEBORRHEIC KERATOSIS: ICD-10-CM

## 2020-08-25 PROBLEM — M71.341 OTHER BURSAL CYST, RIGHT HAND: Status: ACTIVE | Noted: 2020-08-25

## 2020-08-25 PROBLEM — D22.71 MELANOCYTIC NEVI OF RIGHT LOWER LIMB, INCLUDING HIP: Status: ACTIVE | Noted: 2020-08-25

## 2020-08-25 PROBLEM — D48.5 NEOPLASM OF UNCERTAIN BEHAVIOR OF SKIN: Status: ACTIVE | Noted: 2020-08-25

## 2020-08-25 PROBLEM — D22.72 MELANOCYTIC NEVI OF LEFT LOWER LIMB, INCLUDING HIP: Status: ACTIVE | Noted: 2020-08-25

## 2020-08-25 PROCEDURE — ? DEFER

## 2020-08-25 PROCEDURE — ? TREATMENT REGIMEN

## 2020-08-25 PROCEDURE — ? OBSERVATION AND MEASURE

## 2020-08-25 PROCEDURE — 99214 OFFICE O/P EST MOD 30 MIN: CPT

## 2020-08-25 PROCEDURE — ? PRESCRIPTION

## 2020-08-25 PROCEDURE — ? ADDITIONAL NOTES

## 2020-08-25 PROCEDURE — ? COUNSELING

## 2020-08-25 RX ORDER — KETOCONAZOLE 20 MG/G
1 CREAM TOPICAL TWICE A DAY
Qty: 1 | Refills: 11 | Status: ERX | COMMUNITY
Start: 2020-08-25

## 2020-08-25 RX ADMIN — KETOCONAZOLE 1: 20 CREAM TOPICAL at 00:00

## 2020-08-25 ASSESSMENT — LOCATION SIMPLE DESCRIPTION DERM
LOCATION SIMPLE: ABDOMEN
LOCATION SIMPLE: LEFT 3RD TOE
LOCATION SIMPLE: RIGHT NOSE
LOCATION SIMPLE: RIGHT POSTERIOR UPPER ARM
LOCATION SIMPLE: RIGHT PRETIBIAL REGION
LOCATION SIMPLE: RIGHT INDEX FINGER
LOCATION SIMPLE: PLANTAR SURFACE OF RIGHT 1ST TOE
LOCATION SIMPLE: LEFT UPPER BACK
LOCATION SIMPLE: LEFT FOREARM
LOCATION SIMPLE: CHEST

## 2020-08-25 ASSESSMENT — LOCATION DETAILED DESCRIPTION DERM
LOCATION DETAILED: LOWER STERNUM
LOCATION DETAILED: LEFT MID-UPPER BACK
LOCATION DETAILED: LEFT SUPERIOR UPPER BACK
LOCATION DETAILED: RIGHT DISTAL PRETIBIAL REGION
LOCATION DETAILED: LEFT DORSAL 3RD TOE
LOCATION DETAILED: LEFT PROXIMAL DORSAL FOREARM
LOCATION DETAILED: LEFT LATERAL ABDOMEN
LOCATION DETAILED: RIGHT LATERAL PLANTAR 1ST TOE
LOCATION DETAILED: RIGHT PROXIMAL POSTERIOR UPPER ARM
LOCATION DETAILED: RIGHT LATERAL ABDOMEN
LOCATION DETAILED: PERIUNGUAL SKIN RIGHT INDEX FINGER
LOCATION DETAILED: RIGHT NASAL ALA

## 2020-08-25 ASSESSMENT — LOCATION ZONE DERM
LOCATION ZONE: LEG
LOCATION ZONE: FINGER
LOCATION ZONE: TRUNK
LOCATION ZONE: ARM
LOCATION ZONE: NOSE
LOCATION ZONE: TOE

## 2020-08-25 NOTE — HPI: SKIN LESION
Is This A New Presentation, Or A Follow-Up?: Skin Lesion
What Type Of Note Output Would You Prefer (Optional)?: Standard Output
How Severe Is Your Skin Lesion?: mild
Has Your Skin Lesion Been Treated?: not been treated
Additional History: Patient states it was seen by PCP who said it was a normal lesion but to recheck with dermatologist.

## 2020-08-25 NOTE — PROCEDURE: TREATMENT REGIMEN
Detail Level: Simple
Initiate Treatment: ketoconazole 2 % topical cream Apply to chest twice a day as needed

## 2020-09-09 ENCOUNTER — APPOINTMENT (RX ONLY)
Dept: URBAN - METROPOLITAN AREA CLINIC 35 | Facility: CLINIC | Age: 75
Setting detail: DERMATOLOGY
End: 2020-09-09

## 2020-09-09 DIAGNOSIS — D485 NEOPLASM OF UNCERTAIN BEHAVIOR OF SKIN: ICD-10-CM

## 2020-09-09 PROBLEM — D48.5 NEOPLASM OF UNCERTAIN BEHAVIOR OF SKIN: Status: ACTIVE | Noted: 2020-09-09

## 2020-09-09 PROCEDURE — ? BIOPSY BY PUNCH METHOD

## 2020-09-09 PROCEDURE — 11104 PUNCH BX SKIN SINGLE LESION: CPT

## 2020-09-09 ASSESSMENT — LOCATION ZONE DERM: LOCATION ZONE: TRUNK

## 2020-09-09 ASSESSMENT — LOCATION SIMPLE DESCRIPTION DERM: LOCATION SIMPLE: ABDOMEN

## 2020-09-09 ASSESSMENT — LOCATION DETAILED DESCRIPTION DERM: LOCATION DETAILED: LEFT LATERAL ABDOMEN

## 2020-09-09 NOTE — PROCEDURE: BIOPSY BY PUNCH METHOD
Detail Level: Detailed
Was A Bandage Applied: Yes
Punch Size In Mm: 6
Biopsy Type: H and E
Anesthesia Type: 1% lidocaine with epinephrine and a 1:10 solution of 8.4% sodium bicarbonate
Anesthesia Volume In Cc: 2
Additional Anesthesia Volume In Cc (Will Not Render If 0): 0
Hemostasis: None
Epidermal Sutures: 4-0 Nylon
Number Of Epidermal Sutures (Optional): 3
Wound Care: Petrolatum
Dressing: pressure dressing
Suture Removal: 14 days
Patient Will Remove Sutures At Home?: No
Size Of Lesion In Cm (Optional): 0.5
Lab: [id number]
Lab Facility: 
Consent: Written consent was obtained and risks were reviewed including but not limited to scarring, infection, bleeding, scabbing, incomplete removal, nerve damage and allergy to anesthesia.
Post-Care Instructions: I reviewed with the patient in detail post-care instructions.  Patient to cleanse area with mild soap and water, apply Vaseline to area x 14 days. Patient may apply hydrogen peroxide soaks to remove any crusting.
Home Suture Removal Text: Patient was provided a home suture removal kit and will remove their sutures at home.  If they have any questions or difficulties they will call the office.
Notification Instructions: Patient will be notified of biopsy results. However, patient instructed to call the office if not contacted within 2 weeks.
Billing Type: Third-Party Bill
Information: Selecting Yes will display possible errors in your note based on the variables you have selected. This validation is only offered as a suggestion for you. PLEASE NOTE THAT THE VALIDATION TEXT WILL BE REMOVED WHEN YOU FINALIZE YOUR NOTE. IF YOU WANT TO FAX A PRELIMINARY NOTE YOU WILL NEED TO TOGGLE THIS TO 'NO' IF YOU DO NOT WANT IT IN YOUR FAXED NOTE.

## 2020-09-14 DIAGNOSIS — I10 HYPERTENSION, ESSENTIAL: ICD-10-CM

## 2020-09-14 RX ORDER — LOSARTAN POTASSIUM 50 MG/1
50 TABLET ORAL DAILY
Qty: 90 TAB | Refills: 1 | Status: SHIPPED | OUTPATIENT
Start: 2020-09-14 | End: 2020-12-09 | Stop reason: SDUPTHER

## 2020-10-20 ENCOUNTER — OFFICE VISIT (OUTPATIENT)
Dept: MEDICAL GROUP | Facility: MEDICAL CENTER | Age: 75
End: 2020-10-20
Payer: MEDICARE

## 2020-10-20 VITALS
HEART RATE: 72 BPM | SYSTOLIC BLOOD PRESSURE: 158 MMHG | OXYGEN SATURATION: 92 % | BODY MASS INDEX: 28.66 KG/M2 | WEIGHT: 172.2 LBS | TEMPERATURE: 98.2 F | DIASTOLIC BLOOD PRESSURE: 82 MMHG

## 2020-10-20 DIAGNOSIS — E78.5 HYPERLIPIDEMIA WITH TARGET LDL LESS THAN 130: ICD-10-CM

## 2020-10-20 DIAGNOSIS — R73.01 IFG (IMPAIRED FASTING GLUCOSE): ICD-10-CM

## 2020-10-20 DIAGNOSIS — E55.9 VITAMIN D DEFICIENCY: ICD-10-CM

## 2020-10-20 DIAGNOSIS — B35.1 TOENAIL FUNGUS: ICD-10-CM

## 2020-10-20 DIAGNOSIS — F41.8 DEPRESSION WITH ANXIETY: ICD-10-CM

## 2020-10-20 DIAGNOSIS — R61 NIGHT SWEATS: ICD-10-CM

## 2020-10-20 PROCEDURE — 99214 OFFICE O/P EST MOD 30 MIN: CPT | Performed by: NURSE PRACTITIONER

## 2020-10-20 RX ORDER — TERBINAFINE HYDROCHLORIDE 250 MG/1
250 TABLET ORAL DAILY
Qty: 30 TAB | Refills: 0 | Status: SHIPPED
Start: 2020-10-20 | End: 2021-01-07

## 2020-10-20 NOTE — PROGRESS NOTES
Subjective:     Mandie Juan is a 75 y.o. female who presents with toe nail fungus.    HPI:   Seen in f/u for toenail fungus.  She recently found fungus on rt great toenail.  She would like tx. Reviewed risks and benefits of lamisil with pt including but not limited to: elelvated lft.    She is on zoloft 50 mg daily.  She feels she is ready to dc.  She feels that her depression is resolved.  She is satisfied with her life.  She is 1 yr post loosing her .    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  Now she is having occas nite sweats.  Even her feet are sweating.    She is due updated lab for her chol, vitamin d and IFG.  Hasn't been eating as well has she used to with the pandemic.  She is walking for exercise.        Patient Active Problem List    Diagnosis Date Noted   • Hypertension, essential    • GERD without esophagitis    • Depression with anxiety    • Impaired fasting glucose    • Internal hemorrhoids    • Allergic rhinitis due to allergen 02/21/2014   • Basal cell carcinoma    • Vitamin D deficiency 11/03/2011   • Hyperlipidemia with target LDL less than 130 11/03/2011   • Cervical polyp    • Kidney stones    • Colon polyps        Current medicines (including changes today)  Current Outpatient Medications   Medication Sig Dispense Refill   • sertraline (ZOLOFT) 50 MG Tab Take 1 Tab by mouth every day. 90 Tab 0   • terbinafine (LAMISIL) 250 MG Tab Take 1 Tab by mouth every day. 30 Tab 0   • losartan (COZAAR) 50 MG Tab Take 1 Tab by mouth every day. 90 Tab 1   • atorvastatin (LIPITOR) 10 MG Tab TAKE ONE TABLET BY MOUTH EVERY DAY 90 Tab 2   • hydrochlorothiazide (MICROZIDE) 12.5 MG capsule Take 1 Cap by mouth every day. 90 Cap 3     No current facility-administered medications for this visit.        Allergies   Allergen Reactions   • Compazine    • Pcn [Penicillins]    • Sulfa Drugs        ROS  Constitutional: Negative. Negative for fever, chills, weight loss, malaise/fatigue  and diaphoresis.   HENT: Negative. Negative for hearing loss, ear pain, nosebleeds, congestion, sore throat, neck pain, tinnitus and ear discharge.   Respiratory: Negative. Negative for cough, hemoptysis, sputum production, shortness of breath, wheezing and stridor.   Cardiovascular: Negative. Negative for chest pain, palpitations, orthopnea, claudication, leg swelling and PND.   Gastrointestinal: Denies nausea, vomiting, diarrhea, constipation, heartburn, melena or hematochezia.  Genitourinary: Denies dysuria, hematuria, urinary incontinence, frequency or urgency.        Objective:     /82 (BP Location: Right arm, Patient Position: Sitting, BP Cuff Size: Adult)   Pulse 72   Temp 36.8 °C (98.2 °F) (Temporal)   Wt 78.1 kg (172 lb 3.2 oz)   SpO2 92%  Body mass index is 28.66 kg/m².    Physical Exam:  Vitals reviewed.  Constitutional: Oriented to person, place, and time. appears well-developed and well-nourished. No distress.   Cardiovascular: Normal rate, regular rhythm, normal heart sounds and intact distal pulses. Exam reveals no gallop and no friction rub. No murmur heard. No carotid bruits.   Pulmonary/Chest: Effort normal and breath sounds normal. No stridor. No respiratory distress. no wheezes or rales. exhibits no tenderness.   Musculoskeletal: Normal range of motion. exhibits no edema. osbaldo pedal pulses 2+.  Lymphadenopathy: No cervical or supraclavicular adenopathy.   Neurological: Alert and oriented to person, place, and time. exhibits normal muscle tone.  Skin: Skin is warm and dry. No diaphoresis.   Psychiatric: Normal mood and affect. Behavior is normal.      Assessment and Plan:     The following treatment plan was discussed:    1. Toenail fungus      rt great toenail.  use lamisil x 3 mo.  must check HFP each month before refill.     2. Depression with anxiety  sertraline (ZOLOFT) 50 MG Tab    pt ready to wean off.  gave her a slow wean schedule.  f/u if sx occur after wean   3. Night sweats   FSH/LH    ESTRADIOL    sx started after removal of benign sweat gland mass in LLQ.  check lab wiht FSH, LH, estradiol.  f/u 1 mo for test review and sx eval   4. Vitamin D deficiency  VITAMIN D,25 HYDROXY    recheck lab.  not on otc d.  f/u 1mo for review   5. IFG (impaired fasting glucose)  Comp Metabolic Panel    HEMOGLOBIN A1C    MICROALBUMIN CREAT RATIO URINE    not on meds.  on healthy diet and exercises regulalry.  check lab and f/u for review   6. Hyperlipidemia with target LDL less than 130  Comp Metabolic Panel    Lipid Profile    stable on lipitor.  check lab and f/u for review         Followup: Return in about 4 weeks (around 11/17/2020), or if symptoms worsen or fail to improve.

## 2020-10-23 ENCOUNTER — HOSPITAL ENCOUNTER (OUTPATIENT)
Dept: LAB | Facility: MEDICAL CENTER | Age: 75
End: 2020-10-23
Attending: PLASTIC SURGERY
Payer: MEDICARE

## 2020-10-23 PROCEDURE — C9803 HOPD COVID-19 SPEC COLLECT: HCPCS

## 2020-10-23 PROCEDURE — U0003 INFECTIOUS AGENT DETECTION BY NUCLEIC ACID (DNA OR RNA); SEVERE ACUTE RESPIRATORY SYNDROME CORONAVIRUS 2 (SARS-COV-2) (CORONAVIRUS DISEASE [COVID-19]), AMPLIFIED PROBE TECHNIQUE, MAKING USE OF HIGH THROUGHPUT TECHNOLOGIES AS DESCRIBED BY CMS-2020-01-R: HCPCS

## 2020-10-24 LAB
COVID ORDER STATUS COVID19: NORMAL
SARS-COV-2 RNA RESP QL NAA+PROBE: NOTDETECTED
SPECIMEN SOURCE: NORMAL

## 2020-12-04 ENCOUNTER — HOSPITAL ENCOUNTER (OUTPATIENT)
Dept: LAB | Facility: MEDICAL CENTER | Age: 75
End: 2020-12-04
Attending: NURSE PRACTITIONER
Payer: MEDICARE

## 2020-12-04 DIAGNOSIS — R61 NIGHT SWEATS: ICD-10-CM

## 2020-12-04 DIAGNOSIS — E55.9 VITAMIN D DEFICIENCY: ICD-10-CM

## 2020-12-04 DIAGNOSIS — R73.01 IFG (IMPAIRED FASTING GLUCOSE): ICD-10-CM

## 2020-12-04 DIAGNOSIS — E78.5 HYPERLIPIDEMIA WITH TARGET LDL LESS THAN 130: ICD-10-CM

## 2020-12-04 LAB
25(OH)D3 SERPL-MCNC: 28 NG/ML (ref 30–100)
ALBUMIN SERPL BCP-MCNC: 4.4 G/DL (ref 3.2–4.9)
ALBUMIN/GLOB SERPL: 1.7 G/DL
ALP SERPL-CCNC: 67 U/L (ref 30–99)
ALT SERPL-CCNC: 18 U/L (ref 2–50)
ANION GAP SERPL CALC-SCNC: 8 MMOL/L (ref 7–16)
AST SERPL-CCNC: 15 U/L (ref 12–45)
BILIRUB SERPL-MCNC: 0.5 MG/DL (ref 0.1–1.5)
BUN SERPL-MCNC: 18 MG/DL (ref 8–22)
CALCIUM SERPL-MCNC: 9.7 MG/DL (ref 8.5–10.5)
CHLORIDE SERPL-SCNC: 103 MMOL/L (ref 96–112)
CHOLEST SERPL-MCNC: 219 MG/DL (ref 100–199)
CO2 SERPL-SCNC: 27 MMOL/L (ref 20–33)
CREAT SERPL-MCNC: 0.7 MG/DL (ref 0.5–1.4)
CREAT UR-MCNC: 166.72 MG/DL
EST. AVERAGE GLUCOSE BLD GHB EST-MCNC: 134 MG/DL
ESTRADIOL SERPL-MCNC: 8.3 PG/ML
FASTING STATUS PATIENT QL REPORTED: NORMAL
FSH SERPL-ACNC: 53.3 MIU/ML
GLOBULIN SER CALC-MCNC: 2.6 G/DL (ref 1.9–3.5)
GLUCOSE SERPL-MCNC: 125 MG/DL (ref 65–99)
HBA1C MFR BLD: 6.3 % (ref 0–5.6)
HDLC SERPL-MCNC: 82 MG/DL
LDLC SERPL CALC-MCNC: 107 MG/DL
LH SERPL-ACNC: 15.4 IU/L
MICROALBUMIN UR-MCNC: <1.2 MG/DL
MICROALBUMIN/CREAT UR: NORMAL MG/G (ref 0–30)
POTASSIUM SERPL-SCNC: 4.3 MMOL/L (ref 3.6–5.5)
PROT SERPL-MCNC: 7 G/DL (ref 6–8.2)
SODIUM SERPL-SCNC: 138 MMOL/L (ref 135–145)
TRIGL SERPL-MCNC: 149 MG/DL (ref 0–149)

## 2020-12-04 PROCEDURE — 80061 LIPID PANEL: CPT

## 2020-12-04 PROCEDURE — 82570 ASSAY OF URINE CREATININE: CPT

## 2020-12-04 PROCEDURE — 82043 UR ALBUMIN QUANTITATIVE: CPT

## 2020-12-04 PROCEDURE — 83036 HEMOGLOBIN GLYCOSYLATED A1C: CPT

## 2020-12-04 PROCEDURE — 80053 COMPREHEN METABOLIC PANEL: CPT

## 2020-12-04 PROCEDURE — 36415 COLL VENOUS BLD VENIPUNCTURE: CPT

## 2020-12-04 PROCEDURE — 83002 ASSAY OF GONADOTROPIN (LH): CPT

## 2020-12-04 PROCEDURE — 82306 VITAMIN D 25 HYDROXY: CPT

## 2020-12-04 PROCEDURE — 83001 ASSAY OF GONADOTROPIN (FSH): CPT

## 2020-12-04 PROCEDURE — 82670 ASSAY OF TOTAL ESTRADIOL: CPT

## 2020-12-09 DIAGNOSIS — I10 HYPERTENSION, ESSENTIAL: ICD-10-CM

## 2020-12-09 RX ORDER — HYDROCHLOROTHIAZIDE 12.5 MG/1
12.5 CAPSULE, GELATIN COATED ORAL DAILY
Qty: 90 CAP | Refills: 3 | Status: SHIPPED
Start: 2020-12-09 | End: 2021-01-07

## 2020-12-09 RX ORDER — LOSARTAN POTASSIUM 50 MG/1
50 TABLET ORAL DAILY
Qty: 90 TAB | Refills: 1 | Status: SHIPPED
Start: 2020-12-09 | End: 2021-01-07

## 2020-12-28 ENCOUNTER — HOSPITAL ENCOUNTER (OUTPATIENT)
Dept: RADIOLOGY | Facility: MEDICAL CENTER | Age: 75
End: 2020-12-28
Attending: NURSE PRACTITIONER
Payer: MEDICARE

## 2020-12-28 DIAGNOSIS — Z12.31 ENCOUNTER FOR MAMMOGRAM TO ESTABLISH BASELINE MAMMOGRAM: ICD-10-CM

## 2020-12-28 PROCEDURE — 77067 SCR MAMMO BI INCL CAD: CPT

## 2020-12-31 ENCOUNTER — PATIENT OUTREACH (OUTPATIENT)
Dept: MEDICAL GROUP | Facility: MEDICAL CENTER | Age: 75
End: 2020-12-31

## 2020-12-31 NOTE — PROGRESS NOTES
ESTABLISHED PATIENT PRE-VISIT PLANNING     Patient was contacted to complete PVP.     Note: Patient will not be contacted if there is no indication to call.     1.  Reviewed notes from the last few office visits within the medical group: Yes    2.  If any orders were placed at last visit or intended to be done for this visit (i.e. 6 mos follow-up), do we have Results/Consult Notes?         •  Labs - Labs ordered, completed on 12/4/2020 and results are in chart.  Note: If patient appointment is for lab review and patient did not complete labs, check with provider if OK to reschedule patient until labs completed.       •  Imaging - Imaging was not ordered at last office visit.       •  Referrals - No referrals were ordered at last office visit.    3. Is this appointment scheduled as a Hospital Follow-Up? No    4.  Immunizations were updated in Epic using Reconcile Outside Information activity? Yes    5.  Patient is due for the following Health Maintenance Topics:   Health Maintenance Due   Topic Date Due   • IMM ZOSTER VACCINES (1 of 2) 10/04/1993   • Annual Wellness Visit  12/05/2019   • COLONOSCOPY  10/22/2020       - Patient plans to schedule appointment for Colonoscopy/FIT.    6.  AHA (Pulse[employer]) form printed for Provider? N/A

## 2021-01-04 DIAGNOSIS — K21.9 GASTROESOPHAGEAL REFLUX DISEASE WITHOUT ESOPHAGITIS: ICD-10-CM

## 2021-01-04 RX ORDER — OMEPRAZOLE 20 MG/1
CAPSULE, DELAYED RELEASE ORAL
Qty: 90 CAP | Refills: 3 | Status: SHIPPED | OUTPATIENT
Start: 2021-01-04 | End: 2022-04-26 | Stop reason: SDUPTHER

## 2021-01-07 ENCOUNTER — OFFICE VISIT (OUTPATIENT)
Dept: MEDICAL GROUP | Facility: MEDICAL CENTER | Age: 76
End: 2021-01-07
Payer: MEDICARE

## 2021-01-07 VITALS
BODY MASS INDEX: 29.16 KG/M2 | WEIGHT: 175 LBS | HEIGHT: 65 IN | SYSTOLIC BLOOD PRESSURE: 128 MMHG | OXYGEN SATURATION: 95 % | DIASTOLIC BLOOD PRESSURE: 80 MMHG | TEMPERATURE: 96.8 F | HEART RATE: 71 BPM

## 2021-01-07 DIAGNOSIS — E55.9 VITAMIN D DEFICIENCY: ICD-10-CM

## 2021-01-07 DIAGNOSIS — B35.1 TOENAIL FUNGUS: ICD-10-CM

## 2021-01-07 DIAGNOSIS — Z12.11 SCREEN FOR COLON CANCER: ICD-10-CM

## 2021-01-07 DIAGNOSIS — I10 HYPERTENSION, ESSENTIAL: ICD-10-CM

## 2021-01-07 DIAGNOSIS — R73.01 IFG (IMPAIRED FASTING GLUCOSE): ICD-10-CM

## 2021-01-07 DIAGNOSIS — C44.91 BASAL CELL CARCINOMA (BCC), UNSPECIFIED SITE: ICD-10-CM

## 2021-01-07 DIAGNOSIS — M15.9 PRIMARY OSTEOARTHRITIS INVOLVING MULTIPLE JOINTS: ICD-10-CM

## 2021-01-07 DIAGNOSIS — K21.9 GERD WITHOUT ESOPHAGITIS: ICD-10-CM

## 2021-01-07 DIAGNOSIS — E78.5 HYPERLIPIDEMIA WITH TARGET LDL LESS THAN 130: ICD-10-CM

## 2021-01-07 PROBLEM — M15.0 PRIMARY OSTEOARTHRITIS INVOLVING MULTIPLE JOINTS: Status: ACTIVE | Noted: 2021-01-07

## 2021-01-07 PROCEDURE — G0439 PPPS, SUBSEQ VISIT: HCPCS | Performed by: NURSE PRACTITIONER

## 2021-01-07 RX ORDER — TERBINAFINE HYDROCHLORIDE 250 MG/1
250 TABLET ORAL DAILY
Qty: 30 TAB | Refills: 0 | Status: SHIPPED
Start: 2021-01-07 | End: 2021-09-01

## 2021-01-07 RX ORDER — LOSARTAN POTASSIUM AND HYDROCHLOROTHIAZIDE 12.5; 5 MG/1; MG/1
1 TABLET ORAL DAILY
Qty: 90 TAB | Refills: 3 | Status: SHIPPED | OUTPATIENT
Start: 2021-01-07 | End: 2021-10-27

## 2021-01-07 ASSESSMENT — PATIENT HEALTH QUESTIONNAIRE - PHQ9
CLINICAL INTERPRETATION OF PHQ2 SCORE: 1
5. POOR APPETITE OR OVEREATING: 2 - MORE THAN HALF THE DAYS
SUM OF ALL RESPONSES TO PHQ QUESTIONS 1-9: 3

## 2021-01-07 ASSESSMENT — ENCOUNTER SYMPTOMS: GENERAL WELL-BEING: GOOD

## 2021-01-07 ASSESSMENT — ACTIVITIES OF DAILY LIVING (ADL): BATHING_REQUIRES_ASSISTANCE: 0

## 2021-01-07 NOTE — PROGRESS NOTES
Chief Complaint   Patient presents with   • Annual Exam         HPI:  Mandie is a 75 y.o. here for Medicare Annual Wellness Visit.  She is feeling well.    Her granddaughter had COVID in nov.  Pt had sx of fatigue with loss of smell and taste before her granddaughter.  Pt is now recovered.    She is not on a healthy diet.  She has not been exercising as much.  She is still walking some.   Reviewed lab with pt. GFR, alb/cr ratio, LP is wnl  Vitamin d is low at 28 but up from last time.  She is on otc supplement.  Estradiol and FSH/LH consistent with menopause.  CMP is wnl except glucose 125.  Has been eating a lot of bread.  a1c is stable at 6.1.  Not on meds.    She has toenail fungus.  She had ordered lamisil.  Never took it but ready to take now.  Aware of poss lft inc.      GERD without esophagitis  She is on prilosec.  She doesn't have gerd sx but having more gas.  Using gas x prn    Basal cell carcinoma  She is followed by derm.  She had a benign mass remvoed form LLQ abd that was benign    Primary osteoarthritis involving multiple joints  She is having osbaldo knee pain and pain in rt wrist.  Using otc meds for tx.      Hypertension, essential  Bp stable and controlled on med.  She is on losartan and hctz.  Would like to go back to [facility].      Hyperlipidemia with target LDL less than 130  Stable and controlled on lipitor.  LP is at goal except LDL sl elevated.         Patient Active Problem List    Diagnosis Date Noted   • Hypertension, essential    • GERD without esophagitis    • Depression with anxiety    • Impaired fasting glucose    • Internal hemorrhoids    • Allergic rhinitis due to allergen 02/21/2014   • Basal cell carcinoma    • Vitamin D deficiency 11/03/2011   • Hyperlipidemia with target LDL less than 130 11/03/2011   • Cervical polyp    • Kidney stones    • Colon polyps        Current Outpatient Medications   Medication Sig Dispense Refill   • omeprazole (PRILOSEC) 20 MG delayed-release capsule  TAKE ONE CAPSULE BY MOUTH EVERY DAY 90 Cap 3   • hydrochlorothiazide (MICROZIDE) 12.5 MG capsule Take 1 Cap by mouth every day. 90 Cap 3   • losartan (COZAAR) 50 MG Tab Take 1 Tab by mouth every day. 90 Tab 1   • sertraline (ZOLOFT) 50 MG Tab Take 1 Tab by mouth every day. (Patient not taking: Reported on 12/31/2020) 90 Tab 0   • terbinafine (LAMISIL) 250 MG Tab Take 1 Tab by mouth every day. 30 Tab 0   • atorvastatin (LIPITOR) 10 MG Tab TAKE ONE TABLET BY MOUTH EVERY DAY 90 Tab 2     No current facility-administered medications for this visit.         Patient is taking medications as noted in medication list.  Current supplements as per medication list.     Allergies: Compazine, Pcn [penicillins], and Sulfa drugs    Current social contact/activities: talk to friends and family on the phone/face time.with grand daughter often     Is patient current with immunizations? No, due for SHINGRIX (Shingles). Patient is interested in receiving NONE today.    She  reports that she has never smoked. She has never used smokeless tobacco. She reports current alcohol use of about 2.0 oz of alcohol per week. She reports that she does not use drugs.  Counseling given: Not Answered        DPA/Advanced directive: Patient has Advanced Directive on file.     ROS:    Gait: Uses no assistive device   Ostomy: No   Other tubes: No   Amputations: No   Chronic oxygen use No   Last eye exam summer 2020   Wears hearing aids: No   : Reports urinary leakage during the last 6 months that has somewhat interfered with their daily activities or sleep.  Review of Systems   Constitutional: Negative.  Negative for fever, chills, weight loss, malaise/fatigue and diaphoresis.   HENT: Negative.  Negative for hearing loss, ear pain, nosebleeds, congestion, sore throat, neck pain, tinnitus and ear discharge.    Eyes: Negative.  Negative for blurred vision, double vision, photophobia, pain, discharge and redness.   Respiratory: Negative.  Negative for  cough, hemoptysis, sputum production, shortness of breath, wheezing and stridor.    Cardiovascular: Negative.  Negative for chest pain, palpitations, orthopnea, claudication, leg swelling and PND.   Gastrointestinal: Negative.  Negative fornausea, vomiting, abdominal pain, diarrhea, constipation, blood in stool and melena.   Genitourinary: Negative.  Negative for dysuria, urgency, frequency, hematuria and flank pain. having some urge incontinence.  Musculoskeletal: Negative.  Negative for myalgias, back pain, falls.   Skin: Negative.  Negative for itching and rash.   Neurological: Negative.  Negative for dizziness, tingling, tremors, sensory change, speech change, focal weakness, seizures, loss of consciousness, weakness and headaches.   Endo/Heme/Allergies: Negative.  Negative for environmental allergies and polydipsia. Does not bruise/bleed easily.   Psychiatric/Behavioral: Negative.  Negative for depression, suicidal ideas, hallucinations, memory loss and substance abuse. The patient is not nervous/anxious and does not have insomnia.    All other systems reviewed and are negative.    Screening:    cologuard    Depression Screening    Little interest or pleasure in doing things?     Feeling down, depressed, or hopeless?    Trouble falling or staying asleep, or sleeping too much?     Feeling tired or having little energy?     Poor appetite or overeating?     Feeling bad about yourself - or that you are a failure or have let yourself or your family down?    Trouble concentrating on things, such as reading the newspaper or watching television?    Moving or speaking so slowly that other people could have noticed.  Or the opposite - being so fidgety or restless that you have been moving around a lot more than usual?     Thoughts that you would be better off dead, or of hurting yourself?     Patient Health Questionnaire Score:        If depressive symptoms identified deferred to follow up visit unless specifically  addressed in assessment and plan.    Interpretation of PHQ-9 Total Score   Score Severity   1-4 No Depression   5-9 Mild Depression   10-14 Moderate Depression   15-19 Moderately Severe Depression   20-27 Severe Depression      Screening for Cognitive Impairment    Three Minute Recall (river, mekhi, finger)   /3    Draw clock face with all 12 numbers and set the hands to show 10 past 11.       If cognitive concerns identified, deferred for follow up unless specifically addressed in assessment and plan.    Fall Risk Assessment    Has the patient had two or more falls in the last year or any fall with injury in the last year?     If fall risk identified, deferred for follow up unless specifically addressed in assessment and plan.      Safety Assessment    Throw rugs on floor.     Handrails on all stairs.     Good lighting in all hallways.     Difficulty hearing.     Patient counseled about all safety risks that were identified.    Functional Assessment ADLs    Are there any barriers preventing you from cooking for yourself or meeting nutritional needs?   .    Are there any barriers preventing you from driving safely or obtaining transportation?   .    Are there any barriers preventing you from using a telephone or calling for help?   .    Are there any barriers preventing you from shopping?   .    Are there any barriers preventing you from taking care of your own finances?   .    Are there any barriers preventing you from managing your medications?     .    Are there any barriers preventing you from showering, bathing or dressing yourself?   .    Are you currently engaging in any exercise or physical activity?   .     What is your perception of your health?   .    Health Maintenance Summary                IMM ZOSTER VACCINES Overdue 10/4/1993     Annual Wellness Visit Overdue 12/5/2019      Done 12/4/2018      Patient has more history with this topic...    COLONOSCOPY Overdue 10/22/2020      Done 10/22/2015 AMB  "REFERRAL TO GI FOR COLONOSCOPY    MAMMOGRAM Next Due 12/28/2021      Done 12/28/2020 MA-SCREENING MAMMO BILAT W/TOMOSYNTHESIS W/CAD     Patient has more history with this topic...    IMM DTaP/Tdap/Td Vaccine Next Due 11/30/2022      Done 11/30/2012 Imm Admin: Tdap Vaccine    BONE DENSITY Next Due 12/10/2024      Done 12/10/2019 DS-BONE DENSITY STUDY (DEXA)     Patient has more history with this topic...          Patient Care Team:  VIRAJ Del Cid as PCP - General      Social History     Tobacco Use   • Smoking status: Never Smoker   • Smokeless tobacco: Never Used   Substance Use Topics   • Alcohol use: Yes     Alcohol/week: 2.0 oz     Types: 4 Glasses of wine per week   • Drug use: No     Family History   Problem Relation Age of Onset   • Heart Disease Mother    • Cancer Father    • Hypertension Sister    • Hypertension Brother    • Cancer Paternal Grandfather      She  has a past medical history of Allergy, Basal cell carcinoma (2004), Cervical polyp, Colon polyps, Depression with anxiety, GERD without esophagitis, Hyperlipidemia, Hypertension, essential, Impaired fasting glucose, Internal hemorrhoids, Kidney stones, and Vitamin d deficiency. She also has no past medical history of Breast cancer (HCC) or Clotting disorder (HCC).   No past surgical history on file.      Exam:     /80 (BP Location: Right arm, Patient Position: Sitting)   Pulse 71   Temp 36 °C (96.8 °F) (Temporal)   Ht 1.651 m (5' 5\")   Wt 79.4 kg (175 lb)   SpO2 95%  Body mass index is 29.12 kg/m².    Hearing excellent.    Dentition good  Alert, oriented in no acute distress.  Eye contact is good, speech goal directed, affect calm  Review of Systems   Constitutional: Negative.  Negative for fever, chills, weight loss, malaise/fatigue and diaphoresis.   HENT: Negative.  Negative for hearing loss, ear pain, nosebleeds, congestion, sore throat, neck pain, tinnitus and ear discharge.    Eyes: Negative.  Negative for blurred vision, " double vision, photophobia, pain, discharge and redness.   Respiratory: Negative.  Negative for cough, hemoptysis, sputum production, shortness of breath, wheezing and stridor.    Cardiovascular: Negative.  Negative for chest pain, palpitations, orthopnea, claudication, leg swelling and PND.   Gastrointestinal: Negative.  Negative for heartburn, nausea, vomiting, abdominal pain, diarrhea, constipation, blood in stool and melena.   Genitourinary: Negative.  Negative for dysuria, urgency, frequency, incontinence, hematuria and flank pain.   Musculoskeletal: Negative.  Negative for myalgias, back pain, joint pain and falls.   Skin: Negative.  Negative for itching and rash.   Neurological: Negative.  Negative for dizziness, tingling, tremors, sensory change, speech change, focal weakness, seizures, loss of consciousness, weakness and headaches.   Endo/Heme/Allergies: Negative.  Negative for environmental allergies and polydipsia. Does not bruise/bleed easily.   Psychiatric/Behavioral: Negative.  Negative for depression, suicidal ideas, hallucinations, memory loss and substance abuse. The patient is not nervous/anxious and does not have insomnia.    All other systems reviewed and are negative.      Assessment and Plan. The following treatment and monitoring plan is recommended:    1. Toenail fungus  Subsequent Annual Wellness Visit - Includes PPPS ()    terbinafine (LAMISIL) 250 MG Tab    HEPATIC FUNCTION PANEL    start lamisil x 3 mo.  do 1 mo med and check HFP.  will refill if HFP wnl   2. Hyperlipidemia with target LDL less than 130  Subsequent Annual Wellness Visit - Includes PPPS ()    stable on meds. no s/e to meds. lab controlled   3. IFG (impaired fasting glucose)      stable a1c.  not on meds.  enc improving healthy diet and inc exercise.  plan:  recheck 6 mo.  call for lab slip.  f/u for review.    4. Hypertension, essential  Subsequent Annual Wellness Visit - Includes PPPS ()     losartan-hydrochlorothiazide (HYZAAR) 50-12.5 MG per tablet    stable and well controlled on meds.  chg losartan and hctz to hyzaar same mg dose   5. Primary osteoarthritis involving multiple joints  Subsequent Annual Wellness Visit - Includes PPPS ()    having knee and rt wrist pain.  stable w/o meds needed   6. GERD without esophagitis  Subsequent Annual Wellness Visit - Includes PPPS ()    stable and well controlled on prilosec except for gas.  using gas x   7. Basal cell carcinoma (BCC), unspecified site  Subsequent Annual Wellness Visit - Includes PPPS ()    no current concerns.  followed by derm   8. Vitamin D deficiency      improved on otc supplement.  will double dose for next 2 mo then dec to 1 tab daily   9. Screen for colon cancer  COLOGUARD (FIT DNA)    cologuard           Services suggested: No services needed at this time  Health Care Screening recommendations as per orders if indicated.  Referrals offered: PT/OT/Nutrition counseling/Behavioral Health/Smoking cessation as per orders if indicated.    Discussion today about general wellness and lifestyle habits:    · Prevent falls and reduce trip hazards; Cautioned about securing or removing rugs.  · Have a working fire alarm and carbon monoxide detector;   · Engage in regular physical activity and social activities       Follow-up: 6 months - call for lab slip

## 2021-01-07 NOTE — ASSESSMENT & PLAN NOTE
Bp stable and controlled on med.  She is on losartan and hctz.  Would like to go back to hyzaar.

## 2021-01-08 DIAGNOSIS — Z23 NEED FOR VACCINATION: ICD-10-CM

## 2021-01-22 ENCOUNTER — IMMUNIZATION (OUTPATIENT)
Dept: FAMILY PLANNING/WOMEN'S HEALTH CLINIC | Facility: IMMUNIZATION CENTER | Age: 76
End: 2021-01-22
Attending: INTERNAL MEDICINE
Payer: MEDICARE

## 2021-01-22 DIAGNOSIS — Z23 NEED FOR VACCINATION: ICD-10-CM

## 2021-01-22 DIAGNOSIS — Z23 ENCOUNTER FOR VACCINATION: Primary | ICD-10-CM

## 2021-01-22 PROCEDURE — 0001A PFIZER SARS-COV-2 VACCINE: CPT

## 2021-01-22 PROCEDURE — 91300 PFIZER SARS-COV-2 VACCINE: CPT

## 2021-01-25 ENCOUNTER — OFFICE VISIT (OUTPATIENT)
Dept: MEDICAL GROUP | Facility: MEDICAL CENTER | Age: 76
End: 2021-01-25
Payer: MEDICARE

## 2021-01-25 VITALS
WEIGHT: 175.4 LBS | OXYGEN SATURATION: 97 % | HEART RATE: 67 BPM | SYSTOLIC BLOOD PRESSURE: 132 MMHG | BODY MASS INDEX: 29.22 KG/M2 | RESPIRATION RATE: 16 BRPM | DIASTOLIC BLOOD PRESSURE: 86 MMHG | HEIGHT: 65 IN | TEMPERATURE: 97.9 F

## 2021-01-25 DIAGNOSIS — F33.1 MODERATE EPISODE OF RECURRENT MAJOR DEPRESSIVE DISORDER (HCC): ICD-10-CM

## 2021-01-25 DIAGNOSIS — F41.1 GENERALIZED ANXIETY DISORDER: ICD-10-CM

## 2021-01-25 DIAGNOSIS — F41.8 DEPRESSION WITH ANXIETY: ICD-10-CM

## 2021-01-25 PROBLEM — F33.9 EPISODE OF RECURRENT MAJOR DEPRESSIVE DISORDER (HCC): Status: ACTIVE | Noted: 2021-01-25

## 2021-01-25 PROCEDURE — 99214 OFFICE O/P EST MOD 30 MIN: CPT | Performed by: NURSE PRACTITIONER

## 2021-01-26 NOTE — PROGRESS NOTES
Subjective:     Mandie Juan is a 75 y.o. female who presents with anxiety.    HPI:   Seen in f/u for anxiety and depression.  She was seen recently and felt that she was dealing well with the death of her  about a year ago. Then last week she had a bad week.  Her cat was ill and $$ to fix. She was feeling fatigued.  She also has a lot of responsibility of her granddaughter who is 15.  It seems like it all came at once.  She was tearful and felt anxious.  She restarted the zoloft at 50 mg.  She is already feeling better.  Having some anxiety.  Denies suicidal ideation.    Patient Active Problem List    Diagnosis Date Noted   • Episode of recurrent major depressive disorder (HCC) 01/25/2021   • Generalized anxiety disorder 01/25/2021   • Primary osteoarthritis involving multiple joints 01/07/2021   • Hypertension, essential    • GERD without esophagitis    • Impaired fasting glucose    • Internal hemorrhoids    • Allergic rhinitis due to allergen 02/21/2014   • Basal cell carcinoma    • Vitamin D deficiency 11/03/2011   • Hyperlipidemia with target LDL less than 130 11/03/2011   • Cervical polyp    • Kidney stones    • Colon polyps        Current medicines (including changes today)  Current Outpatient Medications   Medication Sig Dispense Refill   • sertraline (ZOLOFT) 50 MG Tab Take 1 Tab by mouth every day. 90 Tab 0   • terbinafine (LAMISIL) 250 MG Tab Take 1 Tab by mouth every day. 30 Tab 0   • losartan-hydrochlorothiazide (HYZAAR) 50-12.5 MG per tablet Take 1 Tab by mouth every day. 90 Tab 3   • omeprazole (PRILOSEC) 20 MG delayed-release capsule TAKE ONE CAPSULE BY MOUTH EVERY DAY 90 Cap 3   • atorvastatin (LIPITOR) 10 MG Tab TAKE ONE TABLET BY MOUTH EVERY DAY 90 Tab 2     No current facility-administered medications for this visit.        Allergies   Allergen Reactions   • Compazine    • Pcn [Penicillins]    • Sulfa Drugs        ROS  Constitutional: Negative. Negative for fever, chills, weight loss,  "malaise/fatigue and diaphoresis.   HENT: Negative. Negative for hearing loss, ear pain, nosebleeds, congestion, sore throat, neck pain, tinnitus and ear discharge.   Respiratory: Negative. Negative for cough, hemoptysis, sputum production, shortness of breath, wheezing and stridor.   Cardiovascular: Negative. Negative for chest pain, palpitations, orthopnea, claudication, leg swelling and PND.   Gastrointestinal: Denies nausea, vomiting, diarrhea, constipation, heartburn, melena or hematochezia.  Genitourinary: Denies dysuria, hematuria, urinary incontinence, frequency or urgency.        Objective:     /86 (BP Location: Right arm, Patient Position: Sitting, BP Cuff Size: Adult)   Pulse 67   Temp 36.6 °C (97.9 °F) (Temporal)   Resp 16   Ht 1.651 m (5' 5\")   Wt 79.6 kg (175 lb 6.4 oz)   SpO2 97%  Body mass index is 29.19 kg/m².    Physical Exam:  Vitals reviewed.  Constitutional: Oriented to person, place, and time. appears well-developed and well-nourished. No distress.   Cardiovascular: Normal rate, regular rhythm, normal heart sounds and intact distal pulses. Exam reveals no gallop and no friction rub. No murmur heard. No carotid bruits.   Pulmonary/Chest: Effort normal and breath sounds normal. No stridor. No respiratory distress. no wheezes or rales. exhibits no tenderness.   Musculoskeletal: Normal range of motion. exhibits no edema. osbaldo pedal pulses 2+.  Lymphadenopathy: No cervical or supraclavicular adenopathy.   Neurological: Alert and oriented to person, place, and time. exhibits normal muscle tone.  Skin: Skin is warm and dry. No diaphoresis.   Psychiatric: Normal mood and affect. Behavior is normal.      Assessment and Plan:     The following treatment plan was discussed:    1. Depression with anxiety  sertraline (ZOLOFT) 50 MG Tab    improving after restarting zoloft.  recommend continue x 1 yr.  f/u 1 mo for sx eval   2. Generalized anxiety disorder      f/u 1 mo for sx eval d/t inc anxiety " and depression recently.  restarted zoloft.     3. Moderate episode of recurrent major depressive disorder (HCC)      restart zoloft.  inc if needed         Followup: Return in about 4 weeks (around 2/22/2021).

## 2021-02-12 ENCOUNTER — IMMUNIZATION (OUTPATIENT)
Dept: FAMILY PLANNING/WOMEN'S HEALTH CLINIC | Facility: IMMUNIZATION CENTER | Age: 76
End: 2021-02-12
Attending: INTERNAL MEDICINE
Payer: MEDICARE

## 2021-02-12 DIAGNOSIS — Z23 ENCOUNTER FOR VACCINATION: Primary | ICD-10-CM

## 2021-02-12 PROCEDURE — 91300 PFIZER SARS-COV-2 VACCINE: CPT

## 2021-02-12 PROCEDURE — 0002A PFIZER SARS-COV-2 VACCINE: CPT

## 2021-02-23 ENCOUNTER — APPOINTMENT (RX ONLY)
Dept: URBAN - METROPOLITAN AREA CLINIC 35 | Facility: CLINIC | Age: 76
Setting detail: DERMATOLOGY
End: 2021-02-23

## 2021-02-23 DIAGNOSIS — M71 OTHER BURSOPATHIES: ICD-10-CM

## 2021-02-23 DIAGNOSIS — L81.4 OTHER MELANIN HYPERPIGMENTATION: ICD-10-CM

## 2021-02-23 DIAGNOSIS — D22 MELANOCYTIC NEVI: ICD-10-CM | Status: STABLE

## 2021-02-23 DIAGNOSIS — D18.0 HEMANGIOMA: ICD-10-CM

## 2021-02-23 DIAGNOSIS — Z71.89 OTHER SPECIFIED COUNSELING: ICD-10-CM

## 2021-02-23 DIAGNOSIS — L82.1 OTHER SEBORRHEIC KERATOSIS: ICD-10-CM

## 2021-02-23 DIAGNOSIS — Z85.828 PERSONAL HISTORY OF OTHER MALIGNANT NEOPLASM OF SKIN: ICD-10-CM

## 2021-02-23 PROBLEM — M71.341 OTHER BURSAL CYST, RIGHT HAND: Status: ACTIVE | Noted: 2021-02-23

## 2021-02-23 PROBLEM — D18.01 HEMANGIOMA OF SKIN AND SUBCUTANEOUS TISSUE: Status: ACTIVE | Noted: 2021-02-23

## 2021-02-23 PROBLEM — D22.72 MELANOCYTIC NEVI OF LEFT LOWER LIMB, INCLUDING HIP: Status: ACTIVE | Noted: 2021-02-23

## 2021-02-23 PROBLEM — D22.61 MELANOCYTIC NEVI OF RIGHT UPPER LIMB, INCLUDING SHOULDER: Status: ACTIVE | Noted: 2021-02-23

## 2021-02-23 PROBLEM — D22.71 MELANOCYTIC NEVI OF RIGHT LOWER LIMB, INCLUDING HIP: Status: ACTIVE | Noted: 2021-02-23

## 2021-02-23 PROCEDURE — ? SUNSCREEN RECOMMENDATIONS

## 2021-02-23 PROCEDURE — ? OBSERVATION AND MEASURE

## 2021-02-23 PROCEDURE — ? COUNSELING

## 2021-02-23 PROCEDURE — 99213 OFFICE O/P EST LOW 20 MIN: CPT

## 2021-02-23 ASSESSMENT — LOCATION DETAILED DESCRIPTION DERM
LOCATION DETAILED: RIGHT LATERAL PLANTAR 1ST TOE
LOCATION DETAILED: LEFT MID-UPPER BACK
LOCATION DETAILED: RIGHT DISTAL PRETIBIAL REGION
LOCATION DETAILED: LEFT SUPERIOR UPPER BACK
LOCATION DETAILED: PERIUNGUAL SKIN RIGHT INDEX FINGER
LOCATION DETAILED: RIGHT NASAL ALA
LOCATION DETAILED: RIGHT LATERAL ABDOMEN
LOCATION DETAILED: RIGHT POSTERIOR SHOULDER
LOCATION DETAILED: LEFT DORSAL 3RD TOE

## 2021-02-23 ASSESSMENT — LOCATION SIMPLE DESCRIPTION DERM
LOCATION SIMPLE: RIGHT NOSE
LOCATION SIMPLE: ABDOMEN
LOCATION SIMPLE: LEFT UPPER BACK
LOCATION SIMPLE: RIGHT INDEX FINGER
LOCATION SIMPLE: LEFT 3RD TOE
LOCATION SIMPLE: PLANTAR SURFACE OF RIGHT 1ST TOE
LOCATION SIMPLE: RIGHT SHOULDER
LOCATION SIMPLE: RIGHT PRETIBIAL REGION

## 2021-02-23 ASSESSMENT — LOCATION ZONE DERM
LOCATION ZONE: FINGER
LOCATION ZONE: NOSE
LOCATION ZONE: LEG
LOCATION ZONE: ARM
LOCATION ZONE: TOE
LOCATION ZONE: TRUNK

## 2021-03-18 DIAGNOSIS — E78.5 HYPERLIPIDEMIA WITH TARGET LDL LESS THAN 130: ICD-10-CM

## 2021-03-19 RX ORDER — ATORVASTATIN CALCIUM 10 MG/1
TABLET, FILM COATED ORAL
Qty: 90 TABLET | Refills: 2 | Status: SHIPPED | OUTPATIENT
Start: 2021-03-19 | End: 2022-01-04

## 2021-07-14 ENCOUNTER — APPOINTMENT (OUTPATIENT)
Dept: MEDICAL GROUP | Facility: MEDICAL CENTER | Age: 76
End: 2021-07-14
Payer: MEDICARE

## 2021-07-19 ENCOUNTER — OFFICE VISIT (OUTPATIENT)
Dept: MEDICAL GROUP | Facility: MEDICAL CENTER | Age: 76
End: 2021-07-19
Payer: MEDICARE

## 2021-07-19 VITALS
WEIGHT: 172 LBS | HEIGHT: 65 IN | SYSTOLIC BLOOD PRESSURE: 136 MMHG | BODY MASS INDEX: 28.66 KG/M2 | OXYGEN SATURATION: 96 % | HEART RATE: 70 BPM | DIASTOLIC BLOOD PRESSURE: 80 MMHG | TEMPERATURE: 97.9 F

## 2021-07-19 DIAGNOSIS — M54.42 ACUTE BILATERAL LOW BACK PAIN WITH BILATERAL SCIATICA: ICD-10-CM

## 2021-07-19 DIAGNOSIS — M54.41 ACUTE BILATERAL LOW BACK PAIN WITH BILATERAL SCIATICA: ICD-10-CM

## 2021-07-19 PROCEDURE — 99214 OFFICE O/P EST MOD 30 MIN: CPT | Performed by: NURSE PRACTITIONER

## 2021-07-19 RX ORDER — NAPROXEN 500 MG/1
500 TABLET ORAL 2 TIMES DAILY WITH MEALS
Qty: 14 TABLET | Refills: 0 | Status: SHIPPED | OUTPATIENT
Start: 2021-07-19 | End: 2021-08-09 | Stop reason: SDUPTHER

## 2021-07-19 NOTE — PROGRESS NOTES
Subjective:   Mandie Juan is a 75 y.o. female here today for back pain    Acute midline low back pain with bilateral sciatica  Patient reports some increased back pain recently. About a month ago she was walking 5-6 miles per day, was walking on a trail and up a staircase and had to pull herself up the stairs using rails and feels that symptoms started that day or the following day.    In the past 2 weeks she has has felt like her legs are weak and collapsing. This has happened twice. Has been taking ibuprofen 400 mg every 4-6 hours for the past few weeks for this which helps. Using ice twice daily.     Does feel like symptoms are improving but still has some stiffness in the morning and some weakness feeling when lifting something heavy.     Denies bowel or bladder changes, no saddle anesthesia.        Current medicines (including changes today)  Current Outpatient Medications   Medication Sig Dispense Refill   • naproxen (NAPROSYN) 500 MG Tab Take 1 tablet by mouth 2 times a day with meals. 14 tablet 0   • atorvastatin (LIPITOR) 10 MG Tab TAKE ONE TABLET BY MOUTH EVERY DAY 90 tablet 2   • sertraline (ZOLOFT) 50 MG Tab Take 1 Tab by mouth every day. 90 Tab 0   • terbinafine (LAMISIL) 250 MG Tab Take 1 Tab by mouth every day. 30 Tab 0   • losartan-hydrochlorothiazide (HYZAAR) 50-12.5 MG per tablet Take 1 Tab by mouth every day. 90 Tab 3   • omeprazole (PRILOSEC) 20 MG delayed-release capsule TAKE ONE CAPSULE BY MOUTH EVERY DAY 90 Cap 3     No current facility-administered medications for this visit.     She  has a past medical history of Allergy, Basal cell carcinoma (2004), Cervical polyp, Colon polyps, Episode of recurrent major depressive disorder (HCC) (1/25/2021), Generalized anxiety disorder (1/25/2021), GERD without esophagitis, Hyperlipidemia, Hypertension, essential, Impaired fasting glucose, Internal hemorrhoids, Kidney stones, Primary osteoarthritis involving multiple joints (1/7/2021), and Vitamin d  "deficiency. She also has no past medical history of Breast cancer (HCC) or Clotting disorder (HCC).    ROS   No chest pain, no shortness of breath, no abdominal pain  Positive ROS as per HPI.  All other systems reviewed and are negative.     Objective:     /80 (BP Location: Right arm, Patient Position: Sitting, BP Cuff Size: Adult)   Pulse 70   Temp 36.6 °C (97.9 °F) (Temporal)   Ht 1.651 m (5' 5\")   Wt 78 kg (172 lb)   SpO2 96%  Body mass index is 28.62 kg/m².     Physical Exam:  Constitutional: Alert, no distress.  Skin: Warm, dry, good turgor, no rashes in visible areas.  Eye: Equal, round and reactive, conjunctiva clear, lids normal.  ENMT: Mask in place  Respiratory: Unlabored respiratory effort  Psych: Alert and oriented x3, normal affect and mood.  MSK: No spinous tenderness. Mild thoracic paraspinal tenderness with deep palpation.       Assessment and Plan:   The following treatment plan was discussed    1. Acute bilateral low back pain with bilateral sciatica  Stable  Continue higher potency NSAID for an additional week with ice twice daily and rest.  Naproxen BID for 7 days.   Symptoms improving  Report worsening symptoms.   -     naproxen (NAPROSYN) 500 MG Tab; Take 1 tablet by mouth 2 times a day with meals.    Followup: Return if symptoms worsen or fail to improve.    I have placed the below orders and discussed them with an approved delegating provider. The MA is performing the below orders under the direction of Dr. Elise               "

## 2021-07-19 NOTE — ASSESSMENT & PLAN NOTE
Patient reports some increased back pain recently. About a month ago she was walking 5-6 miles per day, was walking on a trail and up a staircase and had to pull herself up the stairs using rails and feels that symptoms started that day or the following day.    In the past 2 weeks she has has felt like her legs are weak and collapsing. This has happened twice. Has been taking ibuprofen 400 mg every 4-6 hours for the past few weeks for this which helps. Using ice twice daily.     Does feel like symptoms are improving but still has some stiffness in the morning and some weakness feeling when lifting something heavy.     Denies bowel or bladder changes, no saddle anesthesia.

## 2021-07-26 DIAGNOSIS — M54.41 ACUTE BILATERAL LOW BACK PAIN WITH BILATERAL SCIATICA: ICD-10-CM

## 2021-07-26 DIAGNOSIS — M54.42 ACUTE BILATERAL LOW BACK PAIN WITH BILATERAL SCIATICA: ICD-10-CM

## 2021-08-04 ENCOUNTER — OFFICE VISIT (OUTPATIENT)
Dept: MEDICAL GROUP | Facility: MEDICAL CENTER | Age: 76
End: 2021-08-04
Payer: MEDICARE

## 2021-08-04 VITALS
HEART RATE: 71 BPM | TEMPERATURE: 97.6 F | BODY MASS INDEX: 28.46 KG/M2 | OXYGEN SATURATION: 95 % | SYSTOLIC BLOOD PRESSURE: 118 MMHG | WEIGHT: 170.8 LBS | HEIGHT: 65 IN | DIASTOLIC BLOOD PRESSURE: 76 MMHG

## 2021-08-04 DIAGNOSIS — E55.9 VITAMIN D DEFICIENCY: ICD-10-CM

## 2021-08-04 DIAGNOSIS — M54.41 ACUTE MIDLINE LOW BACK PAIN WITH BILATERAL SCIATICA: ICD-10-CM

## 2021-08-04 DIAGNOSIS — R20.0 BILATERAL LEG NUMBNESS: ICD-10-CM

## 2021-08-04 DIAGNOSIS — F40.240 CLAUSTROPHOBIA: ICD-10-CM

## 2021-08-04 DIAGNOSIS — R73.01 IFG (IMPAIRED FASTING GLUCOSE): ICD-10-CM

## 2021-08-04 DIAGNOSIS — N39.498 OTHER URINARY INCONTINENCE: ICD-10-CM

## 2021-08-04 DIAGNOSIS — R01.1 MURMUR, CARDIAC: ICD-10-CM

## 2021-08-04 DIAGNOSIS — M54.42 ACUTE MIDLINE LOW BACK PAIN WITH BILATERAL SCIATICA: ICD-10-CM

## 2021-08-04 PROCEDURE — 99214 OFFICE O/P EST MOD 30 MIN: CPT | Performed by: NURSE PRACTITIONER

## 2021-08-04 RX ORDER — LORAZEPAM 0.5 MG/1
0.5 TABLET ORAL ONCE
Qty: 1 TABLET | Refills: 0 | Status: SHIPPED | OUTPATIENT
Start: 2021-08-04 | End: 2021-08-04

## 2021-08-04 NOTE — PROGRESS NOTES
Subjective:     Mandie Juan is a 75 y.o. female who presents with LBP.    HPI:   Seen in f/u for LBP.  She recently injured her back.  [place].  She had to pull her self up the trail with her arms.  She didn't have pain iniitally.  Then that nite the pain started.  Pain in lower back.  That nite she tried to get out of med and her legs collapsed.  That has occurred twice.  It was from numbness and weakness.  Now still mild numbness.  She still has milder pain in her lower back.  Numbness and weakness in osbaldo thighs.  Milder now.  Using naprosyn 500 mg.  She has a referral for PT.  She is going to start that soon.  No saddle anesthesia.  Is having more urinary incontinence since she had the leg weakness.  She does have some claustrophobia.      Patient Active Problem List    Diagnosis Date Noted   • Acute midline low back pain with bilateral sciatica 07/19/2021   • Episode of recurrent major depressive disorder (HCC) 01/25/2021   • Generalized anxiety disorder 01/25/2021   • Primary osteoarthritis involving multiple joints 01/07/2021   • Hypertension, essential    • GERD without esophagitis    • Impaired fasting glucose    • Internal hemorrhoids    • Allergic rhinitis due to allergen 02/21/2014   • Basal cell carcinoma    • Vitamin D deficiency 11/03/2011   • Hyperlipidemia with target LDL less than 130 11/03/2011   • Cervical polyp    • Kidney stones    • Colon polyps        Current medicines (including changes today)  Current Outpatient Medications   Medication Sig Dispense Refill   • LORazepam (ATIVAN) 0.5 MG Tab Take 1 tablet by mouth one time for 1 dose. 1 tablet 0   • naproxen (NAPROSYN) 500 MG Tab Take 1 tablet by mouth 2 times a day with meals. 14 tablet 0   • atorvastatin (LIPITOR) 10 MG Tab TAKE ONE TABLET BY MOUTH EVERY DAY 90 tablet 2   • terbinafine (LAMISIL) 250 MG Tab Take 1 Tab by mouth every day. 30 Tab 0   • losartan-hydrochlorothiazide (HYZAAR) 50-12.5 MG  "per tablet Take 1 Tab by mouth every day. 90 Tab 3   • omeprazole (PRILOSEC) 20 MG delayed-release capsule TAKE ONE CAPSULE BY MOUTH EVERY DAY 90 Cap 3     No current facility-administered medications for this visit.       Allergies   Allergen Reactions   • Compazine    • Pcn [Penicillins]    • Sulfa Drugs        ROS  Constitutional: Negative. Negative for fever, chills, weight loss, malaise/fatigue and diaphoresis.   HENT: Negative. Negative for hearing loss, ear pain, nosebleeds, congestion, sore throat, neck pain, tinnitus and ear discharge.   Respiratory: Negative. Negative for cough, hemoptysis, sputum production, shortness of breath, wheezing and stridor.   Cardiovascular: Negative. Negative for chest pain, palpitations, orthopnea, claudication, leg swelling and PND.   Gastrointestinal: Denies nausea, vomiting, diarrhea, constipation, heartburn, melena or hematochezia.  Genitourinary: Denies dysuria, hematuria, urinary incontinence, frequency or urgency.        Objective:     /76 (BP Location: Right arm, Patient Position: Sitting)   Pulse 71   Temp 36.4 °C (97.6 °F) (Temporal)   Ht 1.651 m (5' 5\")   Wt 77.5 kg (170 lb 12.8 oz)   SpO2 95%  Body mass index is 28.42 kg/m².    Physical Exam:  Vitals reviewed.  Constitutional: Oriented to person, place, and time. appears well-developed and well-nourished. No distress.   Cardiovascular: Normal rate, regular rhythm, normal heart sounds and intact distal pulses. Exam reveals no gallop and no friction rub. 3/6 murmur heard. No carotid bruits.   Pulmonary/Chest: Effort normal and breath sounds normal. No stridor. No respiratory distress. no wheezes or rales. exhibits no tenderness.   Musculoskeletal: Normal range of motion. exhibits no edema. osbaldo pedal pulses 2+.  Sensation intact osbaldo LE.  Neg osbaldo SLR.  2+ LEFT knee reflex, 1+ rt knee reflex.  Lymphadenopathy: No cervical or supraclavicular adenopathy.   Neurological: Alert and oriented to person, place, and " time. exhibits normal muscle tone.  Skin: Skin is warm and dry. No diaphoresis.   Psychiatric: Normal mood and affect. Behavior is normal.      Assessment and Plan:     The following treatment plan was discussed:    1. Acute midline low back pain with bilateral sciatica  MR-LUMBAR SPINE-W/O    LORazepam (ATIVAN) 0.5 MG Tab    d/t LBP with new onset urinary incontinence will do stat ct lumbar.  + claustrophobia.  will try to sched in open MRI   2. Bilateral leg numbness  MR-LUMBAR SPINE-W/O    gave 1 ativan for her claustrophobia.  she will have a friend drive her.  f/u w/pt w/results.  if no cauda equina will continue naproxyn & PT   3. Other urinary incontinence  MR-LUMBAR SPINE-W/O   4. Murmur, cardiac  EC-ECHOCARDIOGRAM COMPLETE W/O CONT    echo.  has never had one for her murmur   5. Claustrophobia  LORazepam (ATIVAN) 0.5 MG Tab    use ativan if cannot get into open MRI for her lumbar MRI.  do lab.  f/u 1 mo for review and sx eval         Followup: Return in about 4 weeks (around 9/1/2021).

## 2021-08-06 ENCOUNTER — HOSPITAL ENCOUNTER (OUTPATIENT)
Dept: RADIOLOGY | Facility: MEDICAL CENTER | Age: 76
End: 2021-08-06
Attending: NURSE PRACTITIONER
Payer: MEDICARE

## 2021-08-06 DIAGNOSIS — N39.498 OTHER URINARY INCONTINENCE: ICD-10-CM

## 2021-08-06 DIAGNOSIS — R20.0 BILATERAL LEG NUMBNESS: ICD-10-CM

## 2021-08-06 DIAGNOSIS — M54.42 ACUTE MIDLINE LOW BACK PAIN WITH BILATERAL SCIATICA: ICD-10-CM

## 2021-08-06 DIAGNOSIS — M54.41 ACUTE MIDLINE LOW BACK PAIN WITH BILATERAL SCIATICA: ICD-10-CM

## 2021-08-06 PROCEDURE — 72148 MRI LUMBAR SPINE W/O DYE: CPT | Mod: ME

## 2021-08-09 DIAGNOSIS — M54.41 ACUTE BILATERAL LOW BACK PAIN WITH BILATERAL SCIATICA: ICD-10-CM

## 2021-08-09 DIAGNOSIS — M54.42 ACUTE BILATERAL LOW BACK PAIN WITH BILATERAL SCIATICA: ICD-10-CM

## 2021-08-10 ENCOUNTER — TELEPHONE (OUTPATIENT)
Dept: MEDICAL GROUP | Facility: MEDICAL CENTER | Age: 76
End: 2021-08-10

## 2021-08-10 DIAGNOSIS — M48.061 SPINAL STENOSIS OF LUMBAR REGION WITHOUT NEUROGENIC CLAUDICATION: ICD-10-CM

## 2021-08-10 DIAGNOSIS — M51.36 DDD (DEGENERATIVE DISC DISEASE), LUMBAR: ICD-10-CM

## 2021-08-10 RX ORDER — NAPROXEN 500 MG/1
500 TABLET ORAL 2 TIMES DAILY WITH MEALS
Qty: 60 TABLET | Refills: 0 | Status: SHIPPED | OUTPATIENT
Start: 2021-08-10 | End: 2022-09-30

## 2021-08-10 NOTE — TELEPHONE ENCOUNTER
Pt called and left a message saying that she received her MRI results but she does not understand them. Pt is anxious because she doesn't know what is next. She has PT appt on Thursday. Please advise.

## 2021-08-11 NOTE — TELEPHONE ENCOUNTER
Please let pt know that she has DDD/OA in multiple levels of her lumbar spine with multiple levels of central canal stenosis.  i recommend that she see neurosurgery. If ok with her i will place the referral.

## 2021-08-11 NOTE — TELEPHONE ENCOUNTER
Pt notified. Please place referral. Pt is asking if it is okay to walk 2 miles flat ground. Pt states she is not having any pain, just stiffness and she said that she doesn't numbness in her legs like before. She is also not lifting anything heavy. Please advise.

## 2021-08-26 ENCOUNTER — APPOINTMENT (OUTPATIENT)
Dept: MEDICAL GROUP | Facility: MEDICAL CENTER | Age: 76
End: 2021-08-26
Payer: MEDICARE

## 2021-08-30 ENCOUNTER — PATIENT MESSAGE (OUTPATIENT)
Dept: HEALTH INFORMATION MANAGEMENT | Facility: OTHER | Age: 76
End: 2021-08-30

## 2021-09-01 ENCOUNTER — OFFICE VISIT (OUTPATIENT)
Dept: MEDICAL GROUP | Facility: MEDICAL CENTER | Age: 76
End: 2021-09-01
Payer: MEDICARE

## 2021-09-01 VITALS
DIASTOLIC BLOOD PRESSURE: 84 MMHG | SYSTOLIC BLOOD PRESSURE: 142 MMHG | OXYGEN SATURATION: 95 % | HEART RATE: 72 BPM | HEIGHT: 65 IN | WEIGHT: 169.6 LBS | TEMPERATURE: 97.4 F | BODY MASS INDEX: 28.26 KG/M2

## 2021-09-01 DIAGNOSIS — R73.01 IMPAIRED FASTING GLUCOSE: ICD-10-CM

## 2021-09-01 DIAGNOSIS — M51.36 DDD (DEGENERATIVE DISC DISEASE), LUMBAR: ICD-10-CM

## 2021-09-01 DIAGNOSIS — M54.50 CHRONIC BILATERAL LOW BACK PAIN WITHOUT SCIATICA: ICD-10-CM

## 2021-09-01 DIAGNOSIS — G89.29 CHRONIC BILATERAL LOW BACK PAIN WITHOUT SCIATICA: ICD-10-CM

## 2021-09-01 PROCEDURE — 99214 OFFICE O/P EST MOD 30 MIN: CPT | Performed by: NURSE PRACTITIONER

## 2021-09-01 NOTE — PROGRESS NOTES
Subjective:     Mandie Juan is a 75 y.o. female who presents with LBP.    HPI:   Seen in f/u for LBP.  She was recently seen for back pain with new onset incontinence. MRI lumbar spine showed DDD with central canal stenosis multiple areas.  She has started PT.  Her incontinence has resolved.  Her numbness is almost all gone.  She spoke with neurosurgery nurse.  They recommended doing PT and f/u with them after finish.  She is working on strengthening her core and body.  She is also doing her exercises at home. Reviewed MRI with pt.    She has IFG.  She is stable w/o meds on healthy diet.  Will be due updated lab dec    Patient Active Problem List    Diagnosis Date Noted   • Acute midline low back pain with bilateral sciatica 07/19/2021   • Episode of recurrent major depressive disorder (HCC) 01/25/2021   • Generalized anxiety disorder 01/25/2021   • Primary osteoarthritis involving multiple joints 01/07/2021   • Hypertension, essential    • GERD without esophagitis    • Impaired fasting glucose    • Internal hemorrhoids    • Allergic rhinitis due to allergen 02/21/2014   • Basal cell carcinoma    • Vitamin D deficiency 11/03/2011   • Hyperlipidemia with target LDL less than 130 11/03/2011   • Cervical polyp    • Kidney stones    • Colon polyps        Current medicines (including changes today)  Current Outpatient Medications   Medication Sig Dispense Refill   • sertraline (ZOLOFT) 50 MG Tab Take 1 Tablet by mouth every day. 90 Tablet 0   • naproxen (NAPROSYN) 500 MG Tab Take 1 tablet by mouth 2 times a day with meals. 60 tablet 0   • atorvastatin (LIPITOR) 10 MG Tab TAKE ONE TABLET BY MOUTH EVERY DAY 90 tablet 2   • losartan-hydrochlorothiazide (HYZAAR) 50-12.5 MG per tablet Take 1 Tab by mouth every day. 90 Tab 3   • omeprazole (PRILOSEC) 20 MG delayed-release capsule TAKE ONE CAPSULE BY MOUTH EVERY DAY 90 Cap 3     No current facility-administered medications for this visit.       Allergies  "  Allergen Reactions   • Compazine    • Pcn [Penicillins]    • Sulfa Drugs        ROS  Constitutional: Negative. Negative for fever, chills, weight loss, malaise/fatigue and diaphoresis.   HENT: Negative. Negative for hearing loss, ear pain, nosebleeds, congestion, sore throat, neck pain, tinnitus and ear discharge.   Respiratory: Negative. Negative for cough, hemoptysis, sputum production, shortness of breath, wheezing and stridor.   Cardiovascular: Negative. Negative for chest pain, palpitations, orthopnea, claudication, leg swelling and PND.   Gastrointestinal: Denies nausea, vomiting, diarrhea, constipation, heartburn, melena or hematochezia.  Genitourinary: Denies dysuria, hematuria, urinary incontinence, frequency or urgency.        Objective:     /84 (BP Location: Right arm, Patient Position: Sitting)   Pulse 72   Temp 36.3 °C (97.4 °F) (Temporal)   Ht 1.651 m (5' 5\")   Wt 76.9 kg (169 lb 9.6 oz)   SpO2 95%  Body mass index is 28.22 kg/m².    Physical Exam:  Vitals reviewed.  Constitutional: Oriented to person, place, and time. appears well-developed and well-nourished. No distress.   Cardiovascular: Normal rate, regular rhythm, normal heart sounds and intact distal pulses. Exam reveals no gallop and no friction rub. No murmur heard. No carotid bruits.   Pulmonary/Chest: Effort normal and breath sounds normal. No stridor. No respiratory distress. no wheezes or rales. exhibits no tenderness.   Musculoskeletal: Normal range of motion. exhibits no edema. osbaldo pedal pulses 2+.  Lymphadenopathy: No cervical or supraclavicular adenopathy.   Neurological: Alert and oriented to person, place, and time. exhibits normal muscle tone.  Skin: Skin is warm and dry. No diaphoresis.   Psychiatric: Normal mood and affect. Behavior is normal.      Assessment and Plan:     The following treatment plan was discussed:    1. Chronic bilateral low back pain without sciatica      MRI showing multiple levels DDD w/central " canal stenosis.  continue PT and f/u with neurosurgery as planned.     2. DDD (degenerative disc disease), lumbar     3. Impaired fasting glucose  Comp Metabolic Panel    HEMOGLOBIN A1C    do updated lab in dec.  f/u for review and HRA in 1/22.         Followup: Return in about 4 months (around 1/1/2022).

## 2021-09-23 ENCOUNTER — HOSPITAL ENCOUNTER (OUTPATIENT)
Dept: CARDIOLOGY | Facility: MEDICAL CENTER | Age: 76
End: 2021-09-23
Attending: NURSE PRACTITIONER
Payer: MEDICARE

## 2021-09-23 ENCOUNTER — TELEPHONE (OUTPATIENT)
Dept: MEDICAL GROUP | Facility: MEDICAL CENTER | Age: 76
End: 2021-09-23

## 2021-09-23 DIAGNOSIS — R01.1 MURMUR, CARDIAC: ICD-10-CM

## 2021-09-23 LAB
LV EJECT FRACT  99904: 65
LV EJECT FRACT MOD 2C 99903: 49.41
LV EJECT FRACT MOD 4C 99902: 64.38
LV EJECT FRACT MOD BP 99901: 58.56

## 2021-09-23 PROCEDURE — 93306 TTE W/DOPPLER COMPLETE: CPT

## 2021-09-23 PROCEDURE — 93306 TTE W/DOPPLER COMPLETE: CPT | Mod: 26 | Performed by: INTERNAL MEDICINE

## 2021-09-23 NOTE — TELEPHONE ENCOUNTER
Please let pt know that the echo is wnl. Strong heart muscle and function.  The murmur is prob from mild backflo on the mitral valve.  Has very mild aortic stenosis that could also be the murmur.

## 2021-10-26 DIAGNOSIS — I10 HYPERTENSION, ESSENTIAL: ICD-10-CM

## 2021-10-27 RX ORDER — LOSARTAN POTASSIUM AND HYDROCHLOROTHIAZIDE 12.5; 5 MG/1; MG/1
TABLET ORAL
Qty: 90 TABLET | Refills: 3 | Status: SHIPPED | OUTPATIENT
Start: 2021-10-27 | End: 2022-10-12

## 2022-01-04 DIAGNOSIS — E78.5 HYPERLIPIDEMIA WITH TARGET LDL LESS THAN 130: ICD-10-CM

## 2022-01-04 RX ORDER — ATORVASTATIN CALCIUM 10 MG/1
TABLET, FILM COATED ORAL
Qty: 90 TABLET | Refills: 0 | Status: SHIPPED | OUTPATIENT
Start: 2022-01-04 | End: 2022-03-28 | Stop reason: SDUPTHER

## 2022-01-13 ENCOUNTER — PATIENT MESSAGE (OUTPATIENT)
Dept: HEALTH INFORMATION MANAGEMENT | Facility: OTHER | Age: 77
End: 2022-01-13
Payer: MEDICARE

## 2022-02-16 PROBLEM — G89.29 CHRONIC BILATERAL LOW BACK PAIN WITHOUT SCIATICA: Status: ACTIVE | Noted: 2022-02-16

## 2022-02-16 PROBLEM — M51.369 DDD (DEGENERATIVE DISC DISEASE), LUMBAR: Status: ACTIVE | Noted: 2022-02-16

## 2022-02-16 PROBLEM — M54.50 CHRONIC BILATERAL LOW BACK PAIN WITHOUT SCIATICA: Status: ACTIVE | Noted: 2022-02-16

## 2022-02-16 PROBLEM — M51.36 DDD (DEGENERATIVE DISC DISEASE), LUMBAR: Status: ACTIVE | Noted: 2022-02-16

## 2022-02-17 ENCOUNTER — OFFICE VISIT (OUTPATIENT)
Dept: MEDICAL GROUP | Facility: MEDICAL CENTER | Age: 77
End: 2022-02-17
Payer: MEDICARE

## 2022-02-17 VITALS
OXYGEN SATURATION: 96 % | TEMPERATURE: 98.4 F | WEIGHT: 168 LBS | HEIGHT: 65 IN | SYSTOLIC BLOOD PRESSURE: 122 MMHG | BODY MASS INDEX: 27.99 KG/M2 | DIASTOLIC BLOOD PRESSURE: 80 MMHG | HEART RATE: 76 BPM

## 2022-02-17 DIAGNOSIS — F41.1 GENERALIZED ANXIETY DISORDER: ICD-10-CM

## 2022-02-17 DIAGNOSIS — I10 HYPERTENSION, ESSENTIAL: ICD-10-CM

## 2022-02-17 DIAGNOSIS — K21.9 GERD WITHOUT ESOPHAGITIS: ICD-10-CM

## 2022-02-17 DIAGNOSIS — Z12.39 ENCOUNTER FOR OTHER SCREENING FOR MALIGNANT NEOPLASM OF BREAST: ICD-10-CM

## 2022-02-17 DIAGNOSIS — M51.36 DDD (DEGENERATIVE DISC DISEASE), LUMBAR: ICD-10-CM

## 2022-02-17 DIAGNOSIS — E55.9 VITAMIN D DEFICIENCY: ICD-10-CM

## 2022-02-17 DIAGNOSIS — R53.83 FATIGUE, UNSPECIFIED TYPE: ICD-10-CM

## 2022-02-17 DIAGNOSIS — F33.41 RECURRENT MAJOR DEPRESSIVE DISORDER, IN PARTIAL REMISSION (HCC): ICD-10-CM

## 2022-02-17 DIAGNOSIS — N95.9 POST MENOPAUSAL PROBLEMS: ICD-10-CM

## 2022-02-17 DIAGNOSIS — E78.5 HYPERLIPIDEMIA WITH TARGET LDL LESS THAN 130: ICD-10-CM

## 2022-02-17 DIAGNOSIS — R73.01 IMPAIRED FASTING GLUCOSE: ICD-10-CM

## 2022-02-17 PROCEDURE — 99214 OFFICE O/P EST MOD 30 MIN: CPT | Performed by: NURSE PRACTITIONER

## 2022-02-17 NOTE — ASSESSMENT & PLAN NOTE
Sx much improved with PT.  She does home exercises with good response.  She feels that she had COVID sx in early january.  She is still having some fatigue.  She stopped PT when she had COVID.  Now is ready to restart.

## 2022-02-17 NOTE — ASSESSMENT & PLAN NOTE
Having sx only with eating wrong foods.  Will change to using only as needed. No black tarry stool

## 2022-02-17 NOTE — PROGRESS NOTES
Subjective:     Mandie Juan is a 76 y.o. female who presents with depression.    HPI:   Seen in f/u for depression.  She is feeling much improved.  She is due updated preventative lab, mammo and dexa scan.      Recurrent major depressive disorder (HCC)  She is off zoloft.  Was causing RLS.  She has decided that she never going to get over the death of her .  She is going to get back on track.  She had missed multiple appts.  She was having RLS on med so she stopped med.  RLS resolved.  Feels ok and ready to get back on track.        GERD without esophagitis  Having sx only with eating wrong foods.  Will change to using only as needed. No black tarry stool    Hypertension, essential  Stable and well controlled on med. taking hyzaar approp.      Generalized anxiety disorder  She is sleeping ok if she does her exercise.  Doesn't sleep well if not exercising.  However she wakes up early d/t her cats.    DDD (degenerative disc disease), lumbar  Sx much improved with PT.  She does home exercises with good response.  She feels that she had COVID sx in early january.  She is still having some fatigue.  She stopped PT when she had COVID.  Now is ready to restart.         Patient Active Problem List    Diagnosis Date Noted   • Chronic bilateral low back pain without sciatica 02/16/2022   • DDD (degenerative disc disease), lumbar 02/16/2022   • Recurrent major depressive disorder (HCC) 01/25/2021   • Generalized anxiety disorder 01/25/2021   • Primary osteoarthritis involving multiple joints 01/07/2021   • Hypertension, essential    • GERD without esophagitis    • Impaired fasting glucose    • Internal hemorrhoids    • Allergic rhinitis due to allergen 02/21/2014   • Basal cell carcinoma    • Vitamin D deficiency 11/03/2011   • Hyperlipidemia with target LDL less than 130 11/03/2011   • Cervical polyp    • Kidney stones    • Colon polyps        Current medicines (including changes today)  Current Outpatient  "Medications   Medication Sig Dispense Refill   • naproxen (NAPROSYN) 500 MG Tab Take 1 tablet by mouth 2 times a day with meals. 60 tablet 0   • atorvastatin (LIPITOR) 10 MG Tab TAKE ONE TABLET BY MOUTH EVERY DAY 90 Tablet 0   • losartan-hydrochlorothiazide (HYZAAR) 50-12.5 MG per tablet TAKE ONE TABLET BY MOUTH EVERY DAY 90 Tablet 3   • omeprazole (PRILOSEC) 20 MG delayed-release capsule TAKE ONE CAPSULE BY MOUTH EVERY DAY 90 Cap 3     No current facility-administered medications for this visit.       Allergies   Allergen Reactions   • Compazine    • Pcn [Penicillins]    • Sulfa Drugs        ROS  Constitutional: Negative. Negative for fever, chills, weight loss, malaise/fatigue and diaphoresis.   HENT: Negative. Negative for hearing loss, ear pain, nosebleeds, congestion, sore throat, neck pain, tinnitus and ear discharge.   Respiratory: Negative. Negative for cough, hemoptysis, sputum production, shortness of breath, wheezing and stridor.   Cardiovascular: Negative. Negative for chest pain, palpitations, orthopnea, claudication, leg swelling and PND.   Gastrointestinal: Denies nausea, vomiting, diarrhea, constipation, heartburn, melena or hematochezia.  Genitourinary: Denies dysuria, hematuria, urinary incontinence, frequency or urgency.        Objective:     /80 (BP Location: Right arm, Patient Position: Sitting)   Pulse 76   Temp 36.9 °C (98.4 °F) (Temporal)   Ht 1.651 m (5' 5\")   Wt 76.2 kg (168 lb)   SpO2 96%  Body mass index is 27.96 kg/m².    Physical Exam:  Vitals reviewed.  Constitutional: Oriented to person, place, and time. appears well-developed and well-nourished. No distress.   Cardiovascular: Normal rate, regular rhythm, normal heart sounds and intact distal pulses. Exam reveals no gallop and no friction rub. No murmur heard. No carotid bruits.   Pulmonary/Chest: Effort normal and breath sounds normal. No stridor. No respiratory distress. no wheezes or rales. exhibits no tenderness. "   Musculoskeletal: Normal range of motion. exhibits no edema. osbaldo pedal pulses 2+.  Lymphadenopathy: No cervical or supraclavicular adenopathy.   Neurological: Alert and oriented to person, place, and time. exhibits normal muscle tone.  Skin: Skin is warm and dry. No diaphoresis.   Psychiatric: Normal mood and affect. Behavior is normal.      Assessment and Plan:     The following treatment plan was discussed:    1. Recurrent major depressive disorder, in partial remission (HCC)      off zoloft.  reports no depression sx today.   2. GERD without esophagitis      stable when controlled with diet.  will use prilosec prn only.    3. Hypertension, essential      stable and well controlled.  due for updated lab.  do lab and f/u 1 mo for review.    4. Generalized anxiety disorder      stable w/o med.  sleeping well when stable on healthy diet and regular exercise.    5. DDD (degenerative disc disease), lumbar  Referral to Physical Therapy    LBP is better but would like to restart PT.  referral placed   6. Encounter for other screening for malignant neoplasm of breast  MA-SCREENING MAMMO BILAT W/CAD   7. Post menopausal problems  DS-BONE DENSITY STUDY (DEXA)    dexa scan ordered         Followup: Return in about 4 weeks (around 3/17/2022).

## 2022-02-17 NOTE — ASSESSMENT & PLAN NOTE
She is sleeping ok if she does her exercise.  Doesn't sleep well if not exercising.  However she wakes up early d/t her cats.

## 2022-02-17 NOTE — ASSESSMENT & PLAN NOTE
She is off zoloft.  Was causing RLS.  She has decided that she never going to get over the death of her .  She is going to get back on track.  She had missed multiple appts.  She was having RLS on med so she stopped med.  RLS resolved.  Feels ok and ready to get back on track.

## 2022-02-24 ENCOUNTER — HOSPITAL ENCOUNTER (OUTPATIENT)
Dept: RADIOLOGY | Facility: MEDICAL CENTER | Age: 77
End: 2022-02-24
Attending: NURSE PRACTITIONER
Payer: MEDICARE

## 2022-02-24 DIAGNOSIS — Z12.39 ENCOUNTER FOR OTHER SCREENING FOR MALIGNANT NEOPLASM OF BREAST: ICD-10-CM

## 2022-02-24 PROCEDURE — 77063 BREAST TOMOSYNTHESIS BI: CPT

## 2022-03-14 ENCOUNTER — HOSPITAL ENCOUNTER (OUTPATIENT)
Dept: RADIOLOGY | Facility: MEDICAL CENTER | Age: 77
End: 2022-03-14
Attending: NURSE PRACTITIONER
Payer: MEDICARE

## 2022-03-14 DIAGNOSIS — N95.9 POST MENOPAUSAL PROBLEMS: ICD-10-CM

## 2022-03-14 PROCEDURE — 77080 DXA BONE DENSITY AXIAL: CPT

## 2022-03-16 ENCOUNTER — HOSPITAL ENCOUNTER (OUTPATIENT)
Dept: LAB | Facility: MEDICAL CENTER | Age: 77
End: 2022-03-16
Attending: NURSE PRACTITIONER
Payer: MEDICARE

## 2022-03-16 DIAGNOSIS — R53.83 FATIGUE, UNSPECIFIED TYPE: ICD-10-CM

## 2022-03-16 DIAGNOSIS — E78.5 HYPERLIPIDEMIA WITH TARGET LDL LESS THAN 130: ICD-10-CM

## 2022-03-16 DIAGNOSIS — R73.01 IMPAIRED FASTING GLUCOSE: ICD-10-CM

## 2022-03-16 DIAGNOSIS — I10 HYPERTENSION, ESSENTIAL: ICD-10-CM

## 2022-03-16 DIAGNOSIS — E55.9 VITAMIN D DEFICIENCY: ICD-10-CM

## 2022-03-16 LAB
25(OH)D3 SERPL-MCNC: 22 NG/ML (ref 30–100)
ALBUMIN SERPL BCP-MCNC: 4.3 G/DL (ref 3.2–4.9)
ALBUMIN/GLOB SERPL: 2 G/DL
ALP SERPL-CCNC: 79 U/L (ref 30–99)
ALT SERPL-CCNC: 14 U/L (ref 2–50)
ANION GAP SERPL CALC-SCNC: 9 MMOL/L (ref 7–16)
AST SERPL-CCNC: 10 U/L (ref 12–45)
BASOPHILS # BLD AUTO: 0.6 % (ref 0–1.8)
BASOPHILS # BLD: 0.03 K/UL (ref 0–0.12)
BILIRUB SERPL-MCNC: 0.3 MG/DL (ref 0.1–1.5)
BUN SERPL-MCNC: 19 MG/DL (ref 8–22)
CALCIUM SERPL-MCNC: 9.4 MG/DL (ref 8.5–10.5)
CHLORIDE SERPL-SCNC: 103 MMOL/L (ref 96–112)
CHOLEST SERPL-MCNC: 193 MG/DL (ref 100–199)
CO2 SERPL-SCNC: 27 MMOL/L (ref 20–33)
CREAT SERPL-MCNC: 0.71 MG/DL (ref 0.5–1.4)
CREAT UR-MCNC: 225.69 MG/DL
EOSINOPHIL # BLD AUTO: 0.13 K/UL (ref 0–0.51)
EOSINOPHIL NFR BLD: 2.6 % (ref 0–6.9)
ERYTHROCYTE [DISTWIDTH] IN BLOOD BY AUTOMATED COUNT: 41.1 FL (ref 35.9–50)
EST. AVERAGE GLUCOSE BLD GHB EST-MCNC: 131 MG/DL
FASTING STATUS PATIENT QL REPORTED: NORMAL
FOLATE SERPL-MCNC: 8.9 NG/ML
GFR SERPLBLD CREATININE-BSD FMLA CKD-EPI: 87 ML/MIN/1.73 M 2
GLOBULIN SER CALC-MCNC: 2.1 G/DL (ref 1.9–3.5)
GLUCOSE SERPL-MCNC: 114 MG/DL (ref 65–99)
HBA1C MFR BLD: 6.2 % (ref 4–5.6)
HCT VFR BLD AUTO: 38.9 % (ref 37–47)
HDLC SERPL-MCNC: 76 MG/DL
HGB BLD-MCNC: 13 G/DL (ref 12–16)
IMM GRANULOCYTES # BLD AUTO: 0.02 K/UL (ref 0–0.11)
IMM GRANULOCYTES NFR BLD AUTO: 0.4 % (ref 0–0.9)
LDLC SERPL CALC-MCNC: 96 MG/DL
LYMPHOCYTES # BLD AUTO: 1.97 K/UL (ref 1–4.8)
LYMPHOCYTES NFR BLD: 38.8 % (ref 22–41)
MCH RBC QN AUTO: 29.5 PG (ref 27–33)
MCHC RBC AUTO-ENTMCNC: 33.4 G/DL (ref 33.6–35)
MCV RBC AUTO: 88.4 FL (ref 81.4–97.8)
MICROALBUMIN UR-MCNC: 1.2 MG/DL
MICROALBUMIN/CREAT UR: 5 MG/G (ref 0–30)
MONOCYTES # BLD AUTO: 0.45 K/UL (ref 0–0.85)
MONOCYTES NFR BLD AUTO: 8.9 % (ref 0–13.4)
NEUTROPHILS # BLD AUTO: 2.48 K/UL (ref 2–7.15)
NEUTROPHILS NFR BLD: 48.7 % (ref 44–72)
NRBC # BLD AUTO: 0 K/UL
NRBC BLD-RTO: 0 /100 WBC
PLATELET # BLD AUTO: 259 K/UL (ref 164–446)
PMV BLD AUTO: 10.1 FL (ref 9–12.9)
POTASSIUM SERPL-SCNC: 4.1 MMOL/L (ref 3.6–5.5)
PROT SERPL-MCNC: 6.4 G/DL (ref 6–8.2)
RBC # BLD AUTO: 4.4 M/UL (ref 4.2–5.4)
SODIUM SERPL-SCNC: 139 MMOL/L (ref 135–145)
TRIGL SERPL-MCNC: 104 MG/DL (ref 0–149)
TSH SERPL DL<=0.005 MIU/L-ACNC: 3.51 UIU/ML (ref 0.38–5.33)
VIT B12 SERPL-MCNC: 351 PG/ML (ref 211–911)
WBC # BLD AUTO: 5.1 K/UL (ref 4.8–10.8)

## 2022-03-16 PROCEDURE — 80061 LIPID PANEL: CPT

## 2022-03-16 PROCEDURE — 82306 VITAMIN D 25 HYDROXY: CPT

## 2022-03-16 PROCEDURE — 82570 ASSAY OF URINE CREATININE: CPT

## 2022-03-16 PROCEDURE — 80053 COMPREHEN METABOLIC PANEL: CPT

## 2022-03-16 PROCEDURE — 84443 ASSAY THYROID STIM HORMONE: CPT

## 2022-03-16 PROCEDURE — 85025 COMPLETE CBC W/AUTO DIFF WBC: CPT

## 2022-03-16 PROCEDURE — 82746 ASSAY OF FOLIC ACID SERUM: CPT

## 2022-03-16 PROCEDURE — 83036 HEMOGLOBIN GLYCOSYLATED A1C: CPT

## 2022-03-16 PROCEDURE — 82607 VITAMIN B-12: CPT

## 2022-03-16 PROCEDURE — 82043 UR ALBUMIN QUANTITATIVE: CPT

## 2022-03-16 PROCEDURE — 36415 COLL VENOUS BLD VENIPUNCTURE: CPT

## 2022-03-22 ENCOUNTER — TELEPHONE (OUTPATIENT)
Dept: MEDICAL GROUP | Facility: MEDICAL CENTER | Age: 77
End: 2022-03-22
Payer: MEDICARE

## 2022-03-22 NOTE — LETTER
March 22, 2022        Mandie Juan  [address]  [place] [address]        Dear Mandie:    Our records indicate that you are due for your next  in-clinic visit to review your lab results. Please give us a call at [phone] and our scheduling team will assist you with scheduling this appointment.       If you have any questions or concerns, please don't hesitate to call.        Sincerely,        MANN Del Cid.    Electronically Signed

## 2022-03-23 NOTE — TELEPHONE ENCOUNTER
----- Message from VIRAJ Del Cid sent at 3/17/2022  4:12 PM PDT -----  Please have pt set appointment to review and discuss treatment for labs.

## 2022-03-28 DIAGNOSIS — E78.5 HYPERLIPIDEMIA WITH TARGET LDL LESS THAN 130: ICD-10-CM

## 2022-03-29 RX ORDER — ATORVASTATIN CALCIUM 10 MG/1
10 TABLET, FILM COATED ORAL
Qty: 90 TABLET | Refills: 3 | Status: SHIPPED | OUTPATIENT
Start: 2022-03-29 | End: 2023-03-04

## 2022-04-25 ENCOUNTER — TELEPHONE (OUTPATIENT)
Dept: HEALTH INFORMATION MANAGEMENT | Facility: OTHER | Age: 77
End: 2022-04-25

## 2022-04-26 ENCOUNTER — OFFICE VISIT (OUTPATIENT)
Dept: MEDICAL GROUP | Facility: MEDICAL CENTER | Age: 77
End: 2022-04-26
Payer: MEDICARE

## 2022-04-26 VITALS
OXYGEN SATURATION: 96 % | HEART RATE: 82 BPM | DIASTOLIC BLOOD PRESSURE: 70 MMHG | HEIGHT: 65 IN | BODY MASS INDEX: 27.92 KG/M2 | TEMPERATURE: 97.6 F | SYSTOLIC BLOOD PRESSURE: 122 MMHG | WEIGHT: 167.6 LBS

## 2022-04-26 DIAGNOSIS — K21.9 GASTROESOPHAGEAL REFLUX DISEASE WITHOUT ESOPHAGITIS: ICD-10-CM

## 2022-04-26 DIAGNOSIS — R53.83 FATIGUE, UNSPECIFIED TYPE: ICD-10-CM

## 2022-04-26 DIAGNOSIS — R73.01 IFG (IMPAIRED FASTING GLUCOSE): ICD-10-CM

## 2022-04-26 DIAGNOSIS — E55.9 VITAMIN D DEFICIENCY: ICD-10-CM

## 2022-04-26 DIAGNOSIS — E53.8 B12 DEFICIENCY: ICD-10-CM

## 2022-04-26 PROCEDURE — 99214 OFFICE O/P EST MOD 30 MIN: CPT | Performed by: NURSE PRACTITIONER

## 2022-04-26 RX ORDER — OMEPRAZOLE 20 MG/1
20 CAPSULE, DELAYED RELEASE ORAL
Qty: 90 CAPSULE | Refills: 3 | Status: SHIPPED | OUTPATIENT
Start: 2022-04-26 | End: 2023-05-04 | Stop reason: SDUPTHER

## 2022-04-26 RX ORDER — ERGOCALCIFEROL 1.25 MG/1
50000 CAPSULE ORAL
Qty: 6 CAPSULE | Refills: 3 | Status: SHIPPED
Start: 2022-04-26 | End: 2022-09-30

## 2022-04-26 ASSESSMENT — FIBROSIS 4 INDEX: FIB4 SCORE: 0.8

## 2022-04-26 NOTE — PROGRESS NOTES
Subjective:     Mandie Juan is a 76 y.o. female who presents with GERD.    HPI:   Seen in f/u for GERD.  She is getting more GERD sx.  She is using the omeprazole with gas X occas for abd bloating.  Her bm's are dialy and normal.  She doesn't feel that she eats enough fruit and veggies.  Need refill on med.  She is having fatigue.  She will sleep 6-7 hrs.  She was get up at 0400 with her cats. Then will go back to bed.  She is also taking naps.  She is active with walking.  She is trying to limit her carbs but still eating bread and candy.  She is not on med for DM.    Reviewed lab with pt.  GFR, FOLATE, TSH, T4, LP, CMP, CBC alb/cr ratio is wnl.  She is stable on lipitor.  Taking med approp  a1c is down from 6.4 to 6.3.  Not on med for DM.  Trying to eat healthy diet but still has bread.  Walking regularly for exercis.e   VITAMIN D is chronically low despite taking otc supplement.   b12 is low normal at 351.  Not on otc supplement.     Patient Active Problem List    Diagnosis Date Noted   • Chronic bilateral low back pain without sciatica 02/16/2022   • DDD (degenerative disc disease), lumbar 02/16/2022   • Recurrent major depressive disorder (HCC) 01/25/2021   • Generalized anxiety disorder 01/25/2021   • Primary osteoarthritis involving multiple joints 01/07/2021   • Hypertension, essential    • GERD without esophagitis    • Impaired fasting glucose    • Internal hemorrhoids    • Allergic rhinitis due to allergen 02/21/2014   • Basal cell carcinoma    • Vitamin D deficiency 11/03/2011   • Hyperlipidemia with target LDL less than 130 11/03/2011   • Cervical polyp    • Kidney stones    • Colon polyps        Current medicines (including changes today)  Current Outpatient Medications   Medication Sig Dispense Refill   • omeprazole (PRILOSEC) 20 MG delayed-release capsule Take 1 Capsule by mouth every day. 90 Capsule 3   • vitamin D2, Ergocalciferol, (DRISDOL) 1.25 MG (25214 UT) Cap capsule Take 1  "Capsule by mouth every 14 days. 6 Capsule 3   • atorvastatin (LIPITOR) 10 MG Tab Take 1 Tablet by mouth every day. 90 Tablet 3   • losartan-hydrochlorothiazide (HYZAAR) 50-12.5 MG per tablet TAKE ONE TABLET BY MOUTH EVERY DAY 90 Tablet 3   • naproxen (NAPROSYN) 500 MG Tab Take 1 tablet by mouth 2 times a day with meals. 60 tablet 0     No current facility-administered medications for this visit.       Allergies   Allergen Reactions   • Compazine    • Pcn [Penicillins]    • Sulfa Drugs        ROS  Constitutional: Negative. Negative for fever, chills, weight loss, malaise/fatigue and diaphoresis.   HENT: Negative. Negative for hearing loss, ear pain, nosebleeds, congestion, sore throat, neck pain, tinnitus and ear discharge.   Respiratory: Negative. Negative for cough, hemoptysis, sputum production, shortness of breath, wheezing and stridor.   Cardiovascular: Negative. Negative for chest pain, palpitations, orthopnea, claudication, leg swelling and PND.   Gastrointestinal: Denies nausea, vomiting, diarrhea, constipation, heartburn, melena or hematochezia.  Genitourinary: Denies dysuria, hematuria, urinary incontinence, frequency or urgency.        Objective:     /70 (BP Location: Right arm, Patient Position: Sitting)   Pulse 82   Temp 36.4 °C (97.6 °F) (Temporal)   Ht 1.651 m (5' 5\")   Wt 76 kg (167 lb 9.6 oz)   SpO2 96%  Body mass index is 27.89 kg/m².    Physical Exam:  Vitals reviewed.  Constitutional: Oriented to person, place, and time. appears well-developed and well-nourished. No distress.   Cardiovascular: Normal rate, regular rhythm, normal heart sounds and intact distal pulses. Exam reveals no gallop and no friction rub. No murmur heard. No carotid bruits.   Pulmonary/Chest: Effort normal and breath sounds normal. No stridor. No respiratory distress. no wheezes or rales. exhibits no tenderness.   Musculoskeletal: Normal range of motion. exhibits no edema. osbaldo pedal pulses 2+.  Lymphadenopathy: No " cervical or supraclavicular adenopathy.   Neurological: Alert and oriented to person, place, and time. exhibits normal muscle tone.  Skin: Skin is warm and dry. No diaphoresis.   Psychiatric: Normal mood and affect. Behavior is normal.      Assessment and Plan:     The following treatment plan was discussed:    1. Gastroesophageal reflux disease without esophagitis  omeprazole (PRILOSEC) 20 MG delayed-release capsule    taking prilosec intermittently.  sx mostly controlled.    2. IFG (impaired fasting glucose)  HEMOGLOBIN A1C    not on med for DM.  recheck lab 6 mo.  call for lab slip   3. B12 deficiency  VITAMIN B12    b12 low normal.  take b12 otc 1000 daily.  charu lab 6 mo.  f/u for review.    4. Fatigue, unspecified type  HEMOGLOBIN A1C    VITAMIN D,25 HYDROXY    vitamin D2, Ergocalciferol, (DRISDOL) 1.25 MG (79275 UT) Cap capsule    could be caused by multiple factor.  has insomnia but declines med.  will tx w/vitamin d and B12.  f/u if sx not improved   5. Vitamin D deficiency      ergocalciferol every 14 wks long term.           Followup: Return in about 6 months (around 10/26/2022).

## 2022-05-02 ENCOUNTER — APPOINTMENT (RX ONLY)
Dept: URBAN - METROPOLITAN AREA CLINIC 35 | Facility: CLINIC | Age: 77
Setting detail: DERMATOLOGY
End: 2022-05-02

## 2022-05-02 DIAGNOSIS — Z85.828 PERSONAL HISTORY OF OTHER MALIGNANT NEOPLASM OF SKIN: ICD-10-CM

## 2022-05-02 DIAGNOSIS — L81.4 OTHER MELANIN HYPERPIGMENTATION: ICD-10-CM

## 2022-05-02 DIAGNOSIS — D22 MELANOCYTIC NEVI: ICD-10-CM

## 2022-05-02 DIAGNOSIS — M71 OTHER BURSOPATHIES: ICD-10-CM

## 2022-05-02 DIAGNOSIS — L82.1 OTHER SEBORRHEIC KERATOSIS: ICD-10-CM

## 2022-05-02 DIAGNOSIS — Z71.89 OTHER SPECIFIED COUNSELING: ICD-10-CM

## 2022-05-02 DIAGNOSIS — D18.0 HEMANGIOMA: ICD-10-CM

## 2022-05-02 PROBLEM — D18.01 HEMANGIOMA OF SKIN AND SUBCUTANEOUS TISSUE: Status: ACTIVE | Noted: 2022-05-02

## 2022-05-02 PROBLEM — D22.71 MELANOCYTIC NEVI OF RIGHT LOWER LIMB, INCLUDING HIP: Status: ACTIVE | Noted: 2022-05-02

## 2022-05-02 PROBLEM — M71.341 OTHER BURSAL CYST, RIGHT HAND: Status: ACTIVE | Noted: 2022-05-02

## 2022-05-02 PROBLEM — D22.61 MELANOCYTIC NEVI OF RIGHT UPPER LIMB, INCLUDING SHOULDER: Status: ACTIVE | Noted: 2022-05-02

## 2022-05-02 PROBLEM — D22.72 MELANOCYTIC NEVI OF LEFT LOWER LIMB, INCLUDING HIP: Status: ACTIVE | Noted: 2022-05-02

## 2022-05-02 PROCEDURE — ? SUNSCREEN RECOMMENDATIONS

## 2022-05-02 PROCEDURE — ? COUNSELING

## 2022-05-02 PROCEDURE — ? PRESCRIPTION

## 2022-05-02 PROCEDURE — ? OBSERVATION AND MEASURE

## 2022-05-02 PROCEDURE — 99213 OFFICE O/P EST LOW 20 MIN: CPT

## 2022-05-02 RX ORDER — ALCLOMETASONE DIPROPIONATE 0.5 MG/G
1 CREAM TOPICAL DAILY
Qty: 45 | Refills: 3 | Status: ERX | COMMUNITY
Start: 2022-05-02

## 2022-05-02 RX ADMIN — ALCLOMETASONE DIPROPIONATE 1: 0.5 CREAM TOPICAL at 00:00

## 2022-05-02 ASSESSMENT — LOCATION SIMPLE DESCRIPTION DERM
LOCATION SIMPLE: RIGHT INDEX FINGER
LOCATION SIMPLE: LEFT 3RD TOE
LOCATION SIMPLE: RIGHT PRETIBIAL REGION
LOCATION SIMPLE: RIGHT SHOULDER
LOCATION SIMPLE: RIGHT NOSE
LOCATION SIMPLE: LEFT UPPER BACK
LOCATION SIMPLE: PLANTAR SURFACE OF RIGHT 1ST TOE

## 2022-05-02 ASSESSMENT — LOCATION DETAILED DESCRIPTION DERM
LOCATION DETAILED: RIGHT LATERAL PLANTAR 1ST TOE
LOCATION DETAILED: RIGHT NASAL ALA
LOCATION DETAILED: RIGHT DISTAL PRETIBIAL REGION
LOCATION DETAILED: LEFT MID-UPPER BACK
LOCATION DETAILED: LEFT DORSAL 3RD TOE
LOCATION DETAILED: LEFT SUPERIOR UPPER BACK
LOCATION DETAILED: RIGHT POSTERIOR SHOULDER
LOCATION DETAILED: PERIUNGUAL SKIN RIGHT INDEX FINGER

## 2022-05-02 ASSESSMENT — LOCATION ZONE DERM
LOCATION ZONE: NOSE
LOCATION ZONE: ARM
LOCATION ZONE: LEG
LOCATION ZONE: TRUNK
LOCATION ZONE: TOE
LOCATION ZONE: FINGER

## 2022-05-02 NOTE — PROCEDURE: MIPS QUALITY
Quality 402: Tobacco Use And Help With Quitting Among Adolescents: Patient screened for tobacco and never smoked
Quality 130: Documentation Of Current Medications In The Medical Record: Current Medications Documented
Quality 111:Pneumonia Vaccination Status For Older Adults: Pneumococcal Vaccination Previously Received
Quality 226: Preventive Care And Screening: Tobacco Use: Screening And Cessation Intervention: Patient screened for tobacco use and is an ex/non-smoker
Detail Level: Detailed

## 2022-09-06 ENCOUNTER — OFFICE VISIT (OUTPATIENT)
Dept: MEDICAL GROUP | Facility: MEDICAL CENTER | Age: 77
End: 2022-09-06
Payer: MEDICARE

## 2022-09-06 VITALS
BODY MASS INDEX: 27.49 KG/M2 | HEART RATE: 69 BPM | SYSTOLIC BLOOD PRESSURE: 112 MMHG | DIASTOLIC BLOOD PRESSURE: 78 MMHG | HEIGHT: 65 IN | TEMPERATURE: 98.4 F | RESPIRATION RATE: 12 BRPM | WEIGHT: 165 LBS | OXYGEN SATURATION: 96 %

## 2022-09-06 DIAGNOSIS — M51.36 DDD (DEGENERATIVE DISC DISEASE), LUMBAR: ICD-10-CM

## 2022-09-06 DIAGNOSIS — R20.0 BILATERAL LEG NUMBNESS: ICD-10-CM

## 2022-09-06 DIAGNOSIS — R32 URINARY INCONTINENCE, UNSPECIFIED TYPE: ICD-10-CM

## 2022-09-06 PROCEDURE — 99214 OFFICE O/P EST MOD 30 MIN: CPT | Performed by: NURSE PRACTITIONER

## 2022-09-06 ASSESSMENT — FIBROSIS 4 INDEX: FIB4 SCORE: 0.8

## 2022-09-06 NOTE — PROGRESS NOTES
Subjective:     Mandie Juan is a 76 y.o. female who presents with osbaldo leg numbness.    HPI:   Seen in f/u for osbaldo leg numbness.  Sx started on saturday.  She got up suddenly and bent over.  She has sudden onset again on sunday and same thing happened.  Then today she got up slower and didn't have the numbness.  On sat she did have urinary incontinence with her sx.  She has had these sx befroe and did PT.  It helped but she hasn't been doing the exercises.  She has been lifting heavy objects and not using her legs.  MRI lumbar last yr showed multiple levels of DDD and stenosis.    Patient Active Problem List    Diagnosis Date Noted    Chronic bilateral low back pain without sciatica 02/16/2022    DDD (degenerative disc disease), lumbar 02/16/2022    Recurrent major depressive disorder (HCC) 01/25/2021    Generalized anxiety disorder 01/25/2021    Primary osteoarthritis involving multiple joints 01/07/2021    Hypertension, essential     GERD without esophagitis     Impaired fasting glucose     Internal hemorrhoids     Allergic rhinitis due to allergen 02/21/2014    Basal cell carcinoma     Vitamin D deficiency 11/03/2011    Hyperlipidemia with target LDL less than 130 11/03/2011    Cervical polyp     Kidney stones     Colon polyps        Current medicines (including changes today)  Current Outpatient Medications   Medication Sig Dispense Refill    omeprazole (PRILOSEC) 20 MG delayed-release capsule Take 1 Capsule by mouth every day. 90 Capsule 3    vitamin D2, Ergocalciferol, (DRISDOL) 1.25 MG (72930 UT) Cap capsule Take 1 Capsule by mouth every 14 days. 6 Capsule 3    atorvastatin (LIPITOR) 10 MG Tab Take 1 Tablet by mouth every day. 90 Tablet 3    losartan-hydrochlorothiazide (HYZAAR) 50-12.5 MG per tablet TAKE ONE TABLET BY MOUTH EVERY DAY 90 Tablet 3    naproxen (NAPROSYN) 500 MG Tab Take 1 tablet by mouth 2 times a day with meals. 60 tablet 0     No current facility-administered medications for this  "visit.       Allergies   Allergen Reactions    Compazine     Pcn [Penicillins]     Sulfa Drugs        ROS  Constitutional: Negative. Negative for fever, chills, weight loss, malaise/fatigue and diaphoresis.   HENT: Negative. Negative for hearing loss, ear pain, nosebleeds, congestion, sore throat, neck pain, tinnitus and ear discharge.   Respiratory: Negative. Negative for cough, hemoptysis, sputum production, shortness of breath, wheezing and stridor.   Cardiovascular: Negative. Negative for chest pain, palpitations, orthopnea, claudication, leg swelling and PND.   Gastrointestinal: Denies nausea, vomiting, diarrhea, constipation, heartburn, melena or hematochezia.  Genitourinary: Denies dysuria, hematuria, urinary incontinence, frequency or urgency.        Objective:     /78   Pulse 69   Temp 36.9 °C (98.4 °F) (Temporal)   Resp 12   Ht 1.651 m (5' 5\")   Wt 74.8 kg (165 lb)   SpO2 96%  Body mass index is 27.46 kg/m².    Physical Exam:  Vitals reviewed.  Constitutional: Oriented to person, place, and time. appears well-developed and well-nourished. No distress.   Cardiovascular: Normal rate, regular rhythm, normal heart sounds and intact distal pulses. Exam reveals no gallop and no friction rub. No murmur heard. No carotid bruits.   Pulmonary/Chest: Effort normal and breath sounds normal. No stridor. No respiratory distress. no wheezes or rales. exhibits no tenderness.   Musculoskeletal: Normal range of motion. exhibits no edema. ruperto pedal pulses 2+.  Sensation and movement intact ruperto LE.  2+ ruperto knee reflexes.  Ruperto SLR negative  Lymphadenopathy: No cervical or supraclavicular adenopathy.   Neurological: Alert and oriented to person, place, and time. exhibits normal muscle tone.  Skin: Skin is warm and dry. No diaphoresis.   Psychiatric: Normal mood and affect. Behavior is normal.      Assessment and Plan:     The following treatment plan was discussed:    1. DDD (degenerative disc disease), lumbar  " Referral to Physical Therapy    LUMBAR MRI with multiple levels of DDD AND STENOSIS.  refer back to PT.       2. Bilateral leg numbness  Referral to Physical Therapy    refer to PT for reeducation on exercises and proper lifting and moving.      3. Urinary incontinence, unspecified type      occurred 1x only.  strict ER precautions for reoccurrence            Followup: Return if symptoms worsen or fail to improve.

## 2022-09-09 ENCOUNTER — HOSPITAL ENCOUNTER (OUTPATIENT)
Dept: LAB | Facility: MEDICAL CENTER | Age: 77
End: 2022-09-09
Attending: NURSE PRACTITIONER
Payer: MEDICARE

## 2022-09-09 DIAGNOSIS — R53.83 FATIGUE, UNSPECIFIED TYPE: ICD-10-CM

## 2022-09-09 DIAGNOSIS — R73.01 IFG (IMPAIRED FASTING GLUCOSE): ICD-10-CM

## 2022-09-09 DIAGNOSIS — E53.8 B12 DEFICIENCY: ICD-10-CM

## 2022-09-09 LAB
25(OH)D3 SERPL-MCNC: 25 NG/ML (ref 30–100)
EST. AVERAGE GLUCOSE BLD GHB EST-MCNC: 123 MG/DL
HBA1C MFR BLD: 5.9 % (ref 4–5.6)
VIT B12 SERPL-MCNC: 394 PG/ML (ref 211–911)

## 2022-09-09 PROCEDURE — 82306 VITAMIN D 25 HYDROXY: CPT

## 2022-09-09 PROCEDURE — 36415 COLL VENOUS BLD VENIPUNCTURE: CPT

## 2022-09-09 PROCEDURE — 82607 VITAMIN B-12: CPT

## 2022-09-09 PROCEDURE — 83036 HEMOGLOBIN GLYCOSYLATED A1C: CPT

## 2022-09-30 ENCOUNTER — HOSPITAL ENCOUNTER (OUTPATIENT)
Facility: MEDICAL CENTER | Age: 77
End: 2022-10-01
Attending: EMERGENCY MEDICINE | Admitting: HOSPITALIST
Payer: MEDICARE

## 2022-09-30 ENCOUNTER — APPOINTMENT (OUTPATIENT)
Dept: RADIOLOGY | Facility: MEDICAL CENTER | Age: 77
End: 2022-09-30
Attending: EMERGENCY MEDICINE
Payer: MEDICARE

## 2022-09-30 ENCOUNTER — OFFICE VISIT (OUTPATIENT)
Dept: URGENT CARE | Facility: CLINIC | Age: 77
End: 2022-09-30
Payer: MEDICARE

## 2022-09-30 VITALS
HEART RATE: 72 BPM | BODY MASS INDEX: 27.49 KG/M2 | RESPIRATION RATE: 16 BRPM | DIASTOLIC BLOOD PRESSURE: 88 MMHG | WEIGHT: 165 LBS | HEIGHT: 65 IN | OXYGEN SATURATION: 98 % | SYSTOLIC BLOOD PRESSURE: 162 MMHG | TEMPERATURE: 97.6 F

## 2022-09-30 DIAGNOSIS — R10.13 EPIGASTRIC PAIN: ICD-10-CM

## 2022-09-30 DIAGNOSIS — R07.9 CHEST PAIN, UNSPECIFIED TYPE: ICD-10-CM

## 2022-09-30 PROBLEM — E87.6 HYPOKALEMIA: Status: ACTIVE | Noted: 2022-09-30

## 2022-09-30 LAB
ALBUMIN SERPL BCP-MCNC: 4.9 G/DL (ref 3.2–4.9)
ALBUMIN/GLOB SERPL: 1.7 G/DL
ALP SERPL-CCNC: 79 U/L (ref 30–99)
ALT SERPL-CCNC: 18 U/L (ref 2–50)
ANION GAP SERPL CALC-SCNC: 11 MMOL/L (ref 7–16)
AST SERPL-CCNC: 17 U/L (ref 12–45)
BASOPHILS # BLD AUTO: 0.3 % (ref 0–1.8)
BASOPHILS # BLD: 0.03 K/UL (ref 0–0.12)
BILIRUB SERPL-MCNC: 0.6 MG/DL (ref 0.1–1.5)
BUN SERPL-MCNC: 13 MG/DL (ref 8–22)
CALCIUM SERPL-MCNC: 9.7 MG/DL (ref 8.4–10.2)
CHLORIDE SERPL-SCNC: 100 MMOL/L (ref 96–112)
CO2 SERPL-SCNC: 26 MMOL/L (ref 20–33)
CREAT SERPL-MCNC: 0.73 MG/DL (ref 0.5–1.4)
EKG IMPRESSION: NORMAL
EOSINOPHIL # BLD AUTO: 0.06 K/UL (ref 0–0.51)
EOSINOPHIL NFR BLD: 0.5 % (ref 0–6.9)
ERYTHROCYTE [DISTWIDTH] IN BLOOD BY AUTOMATED COUNT: 39.7 FL (ref 35.9–50)
GFR SERPLBLD CREATININE-BSD FMLA CKD-EPI: 84 ML/MIN/1.73 M 2
GLOBULIN SER CALC-MCNC: 2.9 G/DL (ref 1.9–3.5)
GLUCOSE SERPL-MCNC: 109 MG/DL (ref 65–99)
HCT VFR BLD AUTO: 42 % (ref 37–47)
HGB BLD-MCNC: 14.2 G/DL (ref 12–16)
IMM GRANULOCYTES # BLD AUTO: 0.03 K/UL (ref 0–0.11)
IMM GRANULOCYTES NFR BLD AUTO: 0.3 % (ref 0–0.9)
LYMPHOCYTES # BLD AUTO: 1.72 K/UL (ref 1–4.8)
LYMPHOCYTES NFR BLD: 14.6 % (ref 22–41)
MCH RBC QN AUTO: 29.5 PG (ref 27–33)
MCHC RBC AUTO-ENTMCNC: 33.8 G/DL (ref 33.6–35)
MCV RBC AUTO: 87.1 FL (ref 81.4–97.8)
MONOCYTES # BLD AUTO: 0.72 K/UL (ref 0–0.85)
MONOCYTES NFR BLD AUTO: 6.1 % (ref 0–13.4)
NEUTROPHILS # BLD AUTO: 9.21 K/UL (ref 2–7.15)
NEUTROPHILS NFR BLD: 78.2 % (ref 44–72)
NRBC # BLD AUTO: 0 K/UL
NRBC BLD-RTO: 0 /100 WBC
PLATELET # BLD AUTO: 246 K/UL (ref 164–446)
PMV BLD AUTO: 8.9 FL (ref 9–12.9)
POTASSIUM SERPL-SCNC: 3.5 MMOL/L (ref 3.6–5.5)
PROT SERPL-MCNC: 7.8 G/DL (ref 6–8.2)
RBC # BLD AUTO: 4.82 M/UL (ref 4.2–5.4)
SODIUM SERPL-SCNC: 137 MMOL/L (ref 135–145)
TROPONIN T SERPL-MCNC: 9 NG/L (ref 6–19)
TROPONIN T SERPL-MCNC: <6 NG/L (ref 6–19)
TROPONIN T SERPL-MCNC: <6 NG/L (ref 6–19)
WBC # BLD AUTO: 11.8 K/UL (ref 4.8–10.8)

## 2022-09-30 PROCEDURE — 36415 COLL VENOUS BLD VENIPUNCTURE: CPT | Mod: XU

## 2022-09-30 PROCEDURE — 99285 EMERGENCY DEPT VISIT HI MDM: CPT

## 2022-09-30 PROCEDURE — 94760 N-INVAS EAR/PLS OXIMETRY 1: CPT

## 2022-09-30 PROCEDURE — G0378 HOSPITAL OBSERVATION PER HR: HCPCS

## 2022-09-30 PROCEDURE — 36415 COLL VENOUS BLD VENIPUNCTURE: CPT

## 2022-09-30 PROCEDURE — 93005 ELECTROCARDIOGRAM TRACING: CPT | Performed by: EMERGENCY MEDICINE

## 2022-09-30 PROCEDURE — 80053 COMPREHEN METABOLIC PANEL: CPT

## 2022-09-30 PROCEDURE — 84484 ASSAY OF TROPONIN QUANT: CPT

## 2022-09-30 PROCEDURE — 93005 ELECTROCARDIOGRAM TRACING: CPT

## 2022-09-30 PROCEDURE — 93000 ELECTROCARDIOGRAM COMPLETE: CPT | Performed by: PHYSICIAN ASSISTANT

## 2022-09-30 PROCEDURE — 71045 X-RAY EXAM CHEST 1 VIEW: CPT

## 2022-09-30 PROCEDURE — 99215 OFFICE O/P EST HI 40 MIN: CPT | Performed by: PHYSICIAN ASSISTANT

## 2022-09-30 PROCEDURE — A9270 NON-COVERED ITEM OR SERVICE: HCPCS | Performed by: EMERGENCY MEDICINE

## 2022-09-30 PROCEDURE — 99220 PR INITIAL OBSERVATION CARE,LEVL III: CPT | Performed by: HOSPITALIST

## 2022-09-30 PROCEDURE — 700102 HCHG RX REV CODE 250 W/ 637 OVERRIDE(OP): Performed by: EMERGENCY MEDICINE

## 2022-09-30 PROCEDURE — 85025 COMPLETE CBC W/AUTO DIFF WBC: CPT

## 2022-09-30 RX ORDER — ACETAMINOPHEN 325 MG/1
650 TABLET ORAL EVERY 6 HOURS PRN
Status: DISCONTINUED | OUTPATIENT
Start: 2022-09-30 | End: 2022-10-01 | Stop reason: HOSPADM

## 2022-09-30 RX ORDER — OMEPRAZOLE 20 MG/1
20 CAPSULE, DELAYED RELEASE ORAL DAILY
Status: DISCONTINUED | OUTPATIENT
Start: 2022-10-01 | End: 2022-10-01 | Stop reason: HOSPADM

## 2022-09-30 RX ORDER — ATORVASTATIN CALCIUM 10 MG/1
10 TABLET, FILM COATED ORAL DAILY
Status: DISCONTINUED | OUTPATIENT
Start: 2022-10-01 | End: 2022-10-01 | Stop reason: HOSPADM

## 2022-09-30 RX ORDER — LOSARTAN POTASSIUM 25 MG/1
50 TABLET ORAL
Status: DISCONTINUED | OUTPATIENT
Start: 2022-10-01 | End: 2022-10-01 | Stop reason: HOSPADM

## 2022-09-30 RX ORDER — ALUMINA, MAGNESIA, AND SIMETHICONE 2400; 2400; 240 MG/30ML; MG/30ML; MG/30ML
30 SUSPENSION ORAL EVERY 4 HOURS PRN
Status: ACTIVE | OUTPATIENT
Start: 2022-09-30 | End: 2022-10-01

## 2022-09-30 RX ORDER — IBUPROFEN 200 MG
400 TABLET ORAL EVERY 8 HOURS PRN
COMMUNITY

## 2022-09-30 RX ORDER — LOSARTAN POTASSIUM AND HYDROCHLOROTHIAZIDE 12.5; 5 MG/1; MG/1
1 TABLET ORAL
Status: DISCONTINUED | OUTPATIENT
Start: 2022-09-30 | End: 2022-09-30

## 2022-09-30 RX ORDER — BISACODYL 10 MG
10 SUPPOSITORY, RECTAL RECTAL
Status: DISCONTINUED | OUTPATIENT
Start: 2022-09-30 | End: 2022-10-01 | Stop reason: HOSPADM

## 2022-09-30 RX ORDER — HYDROCHLOROTHIAZIDE 12.5 MG/1
12.5 TABLET ORAL
Status: DISCONTINUED | OUTPATIENT
Start: 2022-10-01 | End: 2022-10-01 | Stop reason: HOSPADM

## 2022-09-30 RX ORDER — SUCRALFATE ORAL 1 G/10ML
1 SUSPENSION ORAL 4 TIMES DAILY
Qty: 414 ML | Refills: 3 | Status: SHIPPED
Start: 2022-09-30 | End: 2023-07-17

## 2022-09-30 RX ORDER — POLYETHYLENE GLYCOL 3350 17 G/17G
1 POWDER, FOR SOLUTION ORAL
Status: DISCONTINUED | OUTPATIENT
Start: 2022-09-30 | End: 2022-10-01 | Stop reason: HOSPADM

## 2022-09-30 RX ORDER — ASPIRIN 81 MG/1
324 TABLET, CHEWABLE ORAL ONCE
Status: COMPLETED | OUTPATIENT
Start: 2022-09-30 | End: 2022-09-30

## 2022-09-30 RX ADMIN — LIDOCAINE HYDROCHLORIDE 30 ML: 20 SOLUTION OROPHARYNGEAL at 16:14

## 2022-09-30 RX ADMIN — ASPIRIN 81 MG 324 MG: 81 TABLET ORAL at 18:00

## 2022-09-30 ASSESSMENT — LIFESTYLE VARIABLES
ALCOHOL_USE: YES
HAVE PEOPLE ANNOYED YOU BY CRITICIZING YOUR DRINKING: NO
HAVE YOU EVER FELT YOU SHOULD CUT DOWN ON YOUR DRINKING: NO
TOTAL SCORE: 0
HOW MANY TIMES IN THE PAST YEAR HAVE YOU HAD 5 OR MORE DRINKS IN A DAY: 0
AVERAGE NUMBER OF DAYS PER WEEK YOU HAVE A DRINK CONTAINING ALCOHOL: 7
EVER HAD A DRINK FIRST THING IN THE MORNING TO STEADY YOUR NERVES TO GET RID OF A HANGOVER: NO
ON A TYPICAL DAY WHEN YOU DRINK ALCOHOL HOW MANY DRINKS DO YOU HAVE: 1
CONSUMPTION TOTAL: NEGATIVE
EVER FELT BAD OR GUILTY ABOUT YOUR DRINKING: NO

## 2022-09-30 ASSESSMENT — COGNITIVE AND FUNCTIONAL STATUS - GENERAL
DAILY ACTIVITIY SCORE: 24
SUGGESTED CMS G CODE MODIFIER MOBILITY: CH
MOBILITY SCORE: 24
SUGGESTED CMS G CODE MODIFIER DAILY ACTIVITY: CH

## 2022-09-30 ASSESSMENT — FIBROSIS 4 INDEX
FIB4 SCORE: 1.27
FIB4 SCORE: 0.8
FIB4 SCORE: 0.8
FIB4 SCORE: 1.27

## 2022-09-30 ASSESSMENT — PAIN DESCRIPTION - PAIN TYPE
TYPE: ACUTE PAIN
TYPE: ACUTE PAIN

## 2022-09-30 ASSESSMENT — ENCOUNTER SYMPTOMS
FOCAL WEAKNESS: 0
EYE DISCHARGE: 0
VOMITING: 0
FLANK PAIN: 0
MYALGIAS: 0
SHORTNESS OF BREATH: 0
EYE REDNESS: 0
COUGH: 0
BRUISES/BLEEDS EASILY: 0
FEVER: 0
ABDOMINAL PAIN: 0
CHILLS: 0
STRIDOR: 0
NERVOUS/ANXIOUS: 0

## 2022-09-30 ASSESSMENT — PATIENT HEALTH QUESTIONNAIRE - PHQ9
2. FEELING DOWN, DEPRESSED, IRRITABLE, OR HOPELESS: NOT AT ALL
SUM OF ALL RESPONSES TO PHQ9 QUESTIONS 1 AND 2: 0
1. LITTLE INTEREST OR PLEASURE IN DOING THINGS: NOT AT ALL

## 2022-09-30 NOTE — ED PROVIDER NOTES
ED Provider Note    CHIEF COMPLAINT  Chief Complaint   Patient presents with    Chest Pain     Pt. Reports CP started this morning after eating breakfast, reports pain is intermittent, radiating through upper R and upper L chest, and into upper abdomen. Pt. Rates pain as 1/10, denies any radiation to back. Denies vomiting, SOB, dizziness.        HPI  Mandie Juan is a 76 y.o. female who presents with a chief complaint of chest pain that started this morning after she ate tea and toast.  The pain lasted all day and she ultimately went to Urgent Care where an EKG was abnormal and she was instructed to come to the ER for further evaluation. She did not have any associated fevers or chills, diaphoresis, nausea, vomiting, or shortness of breath.  She has not tried any medication for her symptoms. She has a history of GERD but notes this pain feels different.    ACS risk factors: No history of MI, DM, tobacco use, amphetamine/cocaine use, obesity, family history of CAD prior to age 60. Patient has a history of HTN and HLD.    ACS features: Not exertional, no shortness of breath, no diaphoresis    PE risk factors: History of DVT/PE, pleuritic component, hemoptysis, unilateral leg swelling/pain, recent travel/immobilization, recent surgery, exogenous hormone use    Aortic dissection features: No neuro symptoms, does not radiate to the back, no ripping/tearing pain    No IVDA, fevers/recent illness, not positional    REVIEW OF SYSTEMS  See HPI for further details. All other systems are negative.     PAST MEDICAL HISTORY   has a past medical history of Allergy, Basal cell carcinoma (2004), Cervical polyp, Chronic bilateral low back pain without sciatica (2/16/2022), Colon polyps, DDD (degenerative disc disease), lumbar (2/16/2022), Episode of recurrent major depressive disorder (HCC) (1/25/2021), Generalized anxiety disorder (1/25/2021), GERD without esophagitis, Hyperlipidemia, Hypertension, essential, Impaired  "fasting glucose, Internal hemorrhoids, Kidney stones, Primary osteoarthritis involving multiple joints (1/7/2021), and Vitamin d deficiency.    SOCIAL HISTORY  Social History     Tobacco Use    Smoking status: Never    Smokeless tobacco: Never   Vaping Use    Vaping Use: Never used   Substance and Sexual Activity    Alcohol use: Yes     Alcohol/week: 2.0 oz     Types: 4 Glasses of wine per week    Drug use: No    Sexual activity: Not Currently     Partners: Male       SURGICAL HISTORY  patient denies any surgical history    CURRENT MEDICATIONS  Home Medications       Reviewed by Kaleb Voss (Pharmacy Tech) on 09/30/22 at 1447  Med List Status: Complete     Medication Last Dose Status   atorvastatin (LIPITOR) 10 MG Tab 9/30/2022 Active   ibuprofen (MOTRIN) 200 MG Tab 9/29/2022 Active   losartan-hydrochlorothiazide (HYZAAR) 50-12.5 MG per tablet 9/30/2022 Active   omeprazole (PRILOSEC) 20 MG delayed-release capsule 9/30/2022 Active   VITAMIN D PO 9/29/2022 Active                    ALLERGIES  Allergies   Allergen Reactions    Compazine     Pcn [Penicillins]     Sulfa Drugs        PHYSICAL EXAM  VITAL SIGNS: BP (!) 169/89   Pulse 82   Temp 36.6 °C (97.9 °F) (Temporal)   Resp 18   Ht 1.651 m (5' 5\")   Wt 74.7 kg (164 lb 10.9 oz)   LMP 03/01/1993   SpO2 98%   BMI 27.40 kg/m²    Pulse ox interpretation: I interpret this pulse ox as normal.  Constitutional: Alert in no apparent distress.  HENT: No signs of trauma, Bilateral external ears normal, Nose normal. Moist mucous membranes.  Eyes: Pupils are equal and reactive, Conjunctiva normal, Non-icteric.   Neck: Normal range of motion, No tenderness, Supple, No stridor.   Lymphatic: No lymphadenopathy noted.   Cardiovascular: Regular rate and rhythm, no murmurs.   Thorax & Lungs: Normal breath sounds, No respiratory distress, No wheezing, No chest tenderness.   Abdomen: Bowel sounds normal, Soft, No tenderness, No masses, No pulsatile masses. No peritoneal " signs.  Skin: Warm, Dry, No erythema, No rash.   Back: Normal alignment.  Extremities: Intact distal pulses, No edema, No cyanosis.  Musculoskeletal: No major deformities noted.   Neurologic: Alert, No focal deficits noted.   Psychiatric: Affect normal, Judgment normal, Mood normal.     DIAGNOSTIC STUDIES / PROCEDURES    LABS  Results for orders placed or performed during the hospital encounter of 09/30/22   CBC with Differential   Result Value Ref Range    WBC 11.8 (H) 4.8 - 10.8 K/uL    RBC 4.82 4.20 - 5.40 M/uL    Hemoglobin 14.2 12.0 - 16.0 g/dL    Hematocrit 42.0 37.0 - 47.0 %    MCV 87.1 81.4 - 97.8 fL    MCH 29.5 27.0 - 33.0 pg    MCHC 33.8 33.6 - 35.0 g/dL    RDW 39.7 35.9 - 50.0 fL    Platelet Count 246 164 - 446 K/uL    MPV 8.9 (L) 9.0 - 12.9 fL    Neutrophils-Polys 78.20 (H) 44.00 - 72.00 %    Lymphocytes 14.60 (L) 22.00 - 41.00 %    Monocytes 6.10 0.00 - 13.40 %    Eosinophils 0.50 0.00 - 6.90 %    Basophils 0.30 0.00 - 1.80 %    Immature Granulocytes 0.30 0.00 - 0.90 %    Nucleated RBC 0.00 /100 WBC    Neutrophils (Absolute) 9.21 (H) 2.00 - 7.15 K/uL    Lymphs (Absolute) 1.72 1.00 - 4.80 K/uL    Monos (Absolute) 0.72 0.00 - 0.85 K/uL    Eos (Absolute) 0.06 0.00 - 0.51 K/uL    Baso (Absolute) 0.03 0.00 - 0.12 K/uL    Immature Granulocytes (abs) 0.03 0.00 - 0.11 K/uL    NRBC (Absolute) 0.00 K/uL   Complete Metabolic Panel (CMP)   Result Value Ref Range    Sodium 137 135 - 145 mmol/L    Potassium 3.5 (L) 3.6 - 5.5 mmol/L    Chloride 100 96 - 112 mmol/L    Co2 26 20 - 33 mmol/L    Anion Gap 11.0 7.0 - 16.0    Glucose 109 (H) 65 - 99 mg/dL    Bun 13 8 - 22 mg/dL    Creatinine 0.73 0.50 - 1.40 mg/dL    Calcium 9.7 8.4 - 10.2 mg/dL    AST(SGOT) 17 12 - 45 U/L    ALT(SGPT) 18 2 - 50 U/L    Alkaline Phosphatase 79 30 - 99 U/L    Total Bilirubin 0.6 0.1 - 1.5 mg/dL    Albumin 4.9 3.2 - 4.9 g/dL    Total Protein 7.8 6.0 - 8.2 g/dL    Globulin 2.9 1.9 - 3.5 g/dL    A-G Ratio 1.7 g/dL   Troponin   Result Value Ref  Range    Troponin T <6 6 - 19 ng/L   ESTIMATED GFR   Result Value Ref Range    GFR (CKD-EPI) 84 >60 mL/min/1.73 m 2   TROPONIN   Result Value Ref Range    Troponin T <6 6 - 19 ng/L   Troponin in four (4) hours   Result Value Ref Range    Troponin T 9 6 - 19 ng/L   CBC without Differential   Result Value Ref Range    WBC 4.7 (L) 4.8 - 10.8 K/uL    RBC 4.59 4.20 - 5.40 M/uL    Hemoglobin 13.8 12.0 - 16.0 g/dL    Hematocrit 40.7 37.0 - 47.0 %    MCV 88.7 81.4 - 97.8 fL    MCH 30.1 27.0 - 33.0 pg    MCHC 33.9 33.6 - 35.0 g/dL    RDW 40.1 35.9 - 50.0 fL    Platelet Count 243 164 - 446 K/uL    MPV 9.6 9.0 - 12.9 fL   Comp Metabolic Panel (CMP)   Result Value Ref Range    Sodium 140 135 - 145 mmol/L    Potassium 4.2 3.6 - 5.5 mmol/L    Chloride 103 96 - 112 mmol/L    Co2 27 20 - 33 mmol/L    Anion Gap 10.0 7.0 - 16.0    Glucose 113 (H) 65 - 99 mg/dL    Bun 11 8 - 22 mg/dL    Creatinine 0.60 0.50 - 1.40 mg/dL    Calcium 9.9 8.4 - 10.2 mg/dL    AST(SGOT) 16 12 - 45 U/L    ALT(SGPT) 14 2 - 50 U/L    Alkaline Phosphatase 68 30 - 99 U/L    Total Bilirubin 0.7 0.1 - 1.5 mg/dL    Albumin 4.5 3.2 - 4.9 g/dL    Total Protein 7.3 6.0 - 8.2 g/dL    Globulin 2.8 1.9 - 3.5 g/dL    A-G Ratio 1.6 g/dL   Magnesium   Result Value Ref Range    Magnesium 2.1 1.5 - 2.5 mg/dL   ESTIMATED GFR   Result Value Ref Range    GFR (CKD-EPI) 91 >60 mL/min/1.73 m 2   EKG   Result Value Ref Range    Report       [facility]    Test Date:  2022  Pt Name:    JIN CANNON              Department: EDS  MRN:        [id number]                      Room:  Gender:     Female                       Technician: [id number]  :        [id number]                   Requested By:ER TRIAGE PROTOCOL  Order #:    [id number]                    Reading MD: Saqib Amin MD    Measurements  Intervals                                Axis  Rate:       74                           P:          12  SC:         162                           QRS:        -55  QRSD:       99                           T:          90  QT:         433  QTc:        481    Interpretive Statements  Sinus rhythm  Left anterior fascicular block  Abnormal R-wave progression, early transition  Probable left ventricular hypertrophy  Nonspecific T abnrm, anterolateral leads  No previous ECG available for comparison  Electronically Signed On 9- 15:37:44 PDT by Saqib Amin MD       RADIOLOGY  NM-CARDIAC STRESS TEST         DX-CHEST-PORTABLE (1 VIEW)   Final Result      No radiographic evidence of acute cardiopulmonary process.        COURSE & MEDICAL DECISION MAKING  Pertinent Labs & Imaging studies reviewed. (See chart for details)  This is a 76 year female with a h/o GERD who is here with epigastric pain that has been ongoing for the day. Her EKG does have some non-specific ST-T wave abnormalities but troponin is negative. CXR is normal.    HEART score: 4    Patient reevaluated at bedside. Her epigastric pain has resolved after the GI cocktail. Given her moderate risk HEART score and through shared decision making we moved ahead with hospitalization for additional workup and management.    Discussed with the hospitalist and admitted in guarded condition.    FINAL IMPRESSION  1. Epigastric pain  sucralfate (CARAFATE) 1 GM/10ML Suspension    Referral to Gastroenterology            Electronically signed by: Saqib Amin M.D., 9/30/2022 3:43 PM

## 2022-09-30 NOTE — PROGRESS NOTES
"Subjective:   Mandie Juan is a 76 y.o. female who presents for Chest Pain (X this am, chest pain across chest)      HPI  The patient is a 76-year-old female who presents to the clinic with complaints of intermittent chest pain onset this morning around 0600.  Describes this chest pain is sharp and intermittent.  No obvious known aggravating or alleviating factors.  Denies any arm pain or back pain.  Denies any sudden injury or trauma but she has been going to physical therapy for her back and has been doing abdominal exercises.  Location to her mid chest. Denies any recent illness, fever, chills, cough, hemoptysis, shortness of breath, dizziness, sweating, numbness, tingling, weakness, leg pain or swelling.  Denies history of MI.  Denies family history of heart disease or stroke.        Medications:    atorvastatin Tabs  losartan-hydrochlorothiazide  naproxen Tabs  omeprazole  vitamin D2 (Ergocalciferol) Caps    Allergies: Compazine, Pcn [penicillins], and Sulfa drugs    Problem List: Mandie Juan does not have any pertinent problems on file.    Surgical History:  No past surgical history on file.    Past Social Hx: Mandie Juan  reports that she has never smoked. She has never used smokeless tobacco. She reports current alcohol use of about 2.0 oz per week. She reports that she does not use drugs.     Past Family Hx:  Mandie Juan family history includes Cancer in her father and paternal grandfather; Heart Disease in her mother; Hypertension in her brother and sister.     Problem list, medications, and allergies reviewed by myself today in Epic.     Objective:     BP (!) 162/88 (BP Location: Left arm, Patient Position: Sitting, BP Cuff Size: Large adult)   Pulse 72   Temp 36.4 °C (97.6 °F) (Temporal)   Resp 16   Ht 1.651 m (5' 5\")   Wt 74.8 kg (165 lb)   LMP 03/01/1993   SpO2 98%   BMI 27.46 kg/m²     Physical Exam  Vitals reviewed.   Constitutional:       " General: She is not in acute distress.     Appearance: Normal appearance. She is not ill-appearing or toxic-appearing.   Eyes:      Conjunctiva/sclera: Conjunctivae normal.      Pupils: Pupils are equal, round, and reactive to light.   Cardiovascular:      Rate and Rhythm: Normal rate and regular rhythm.      Heart sounds: Normal heart sounds.   Pulmonary:      Effort: Pulmonary effort is normal. No respiratory distress.      Breath sounds: Normal breath sounds. No wheezing, rhonchi or rales.   Musculoskeletal:      Cervical back: Neck supple.      Right lower leg: No edema.      Left lower leg: No edema.   Lymphadenopathy:      Cervical: No cervical adenopathy.   Skin:     General: Skin is warm and dry.   Neurological:      General: No focal deficit present.      Mental Status: She is alert and oriented to person, place, and time.   Psychiatric:         Mood and Affect: Mood normal.         Behavior: Behavior normal.             EKG Interpretation:  Interpreted by me    Rhythm:  Normal sinus rhythm   Rate: 69  Axis: QRS -52  Ectopy: none  Conduction: LAFB similar to previous in 2011.   ST Segments: no acute change  T Waves: Inversion aVL, biphasic V3-V5.   Clinical Impression: sinus rhythm at 69 bpm with LVH, LAFB, and nonspecific T wave changes in lateral leads.     Diagnosis and associated orders:     1. Chest pain, unspecified type  - EKG - Clinic Performed     Comments/MDM:      This is a pleasant 76-year-old female who presents the clinic with complaints of intermittent sharp chest pains located to the mid chest onset this morning.  See full history above.  Examination findings benign.  EKG findings as above.  Similar to previous EKG in 2011, however some T wave changes.  She is well-appearing in no acute distress.  She currently has no chest pain.  Vital signs showed mildly elevated BP.  Discussed differential diagnosis.  Recommend the patient present immediately to the ER for higher level evaluation and  care.  Patient verbalized understanding and agreed to plan of care.  Recommend she have a friend drive her to the ER or EMS transfer.  Patient politely declined and is going to drive herself via private vehicle to [facility]. I called transfer center and report given awaiting patient arrival.         Please note that this dictation was created using voice recognition software. I have made a reasonable attempt to correct obvious errors, but I expect that there are errors of grammar and possibly content that I did not discover before finalizing the note.    This note was electronically signed by George Richardson PA-C

## 2022-10-01 ENCOUNTER — APPOINTMENT (OUTPATIENT)
Dept: RADIOLOGY | Facility: MEDICAL CENTER | Age: 77
End: 2022-10-01
Attending: HOSPITALIST
Payer: MEDICARE

## 2022-10-01 VITALS
TEMPERATURE: 97.6 F | HEART RATE: 73 BPM | BODY MASS INDEX: 28.06 KG/M2 | WEIGHT: 168.43 LBS | SYSTOLIC BLOOD PRESSURE: 150 MMHG | DIASTOLIC BLOOD PRESSURE: 76 MMHG | HEIGHT: 65 IN | RESPIRATION RATE: 18 BRPM | OXYGEN SATURATION: 96 %

## 2022-10-01 PROBLEM — R07.9 CHEST PAIN: Status: RESOLVED | Noted: 2022-09-30 | Resolved: 2022-10-01

## 2022-10-01 LAB
ALBUMIN SERPL BCP-MCNC: 4.5 G/DL (ref 3.2–4.9)
ALBUMIN/GLOB SERPL: 1.6 G/DL
ALP SERPL-CCNC: 68 U/L (ref 30–99)
ALT SERPL-CCNC: 14 U/L (ref 2–50)
ANION GAP SERPL CALC-SCNC: 10 MMOL/L (ref 7–16)
AST SERPL-CCNC: 16 U/L (ref 12–45)
BILIRUB SERPL-MCNC: 0.7 MG/DL (ref 0.1–1.5)
BUN SERPL-MCNC: 11 MG/DL (ref 8–22)
CALCIUM SERPL-MCNC: 9.9 MG/DL (ref 8.4–10.2)
CHLORIDE SERPL-SCNC: 103 MMOL/L (ref 96–112)
CO2 SERPL-SCNC: 27 MMOL/L (ref 20–33)
CREAT SERPL-MCNC: 0.6 MG/DL (ref 0.5–1.4)
EKG IMPRESSION: NORMAL
ERYTHROCYTE [DISTWIDTH] IN BLOOD BY AUTOMATED COUNT: 40.1 FL (ref 35.9–50)
GFR SERPLBLD CREATININE-BSD FMLA CKD-EPI: 91 ML/MIN/1.73 M 2
GLOBULIN SER CALC-MCNC: 2.8 G/DL (ref 1.9–3.5)
GLUCOSE SERPL-MCNC: 113 MG/DL (ref 65–99)
HCT VFR BLD AUTO: 40.7 % (ref 37–47)
HGB BLD-MCNC: 13.8 G/DL (ref 12–16)
MAGNESIUM SERPL-MCNC: 2.1 MG/DL (ref 1.5–2.5)
MCH RBC QN AUTO: 30.1 PG (ref 27–33)
MCHC RBC AUTO-ENTMCNC: 33.9 G/DL (ref 33.6–35)
MCV RBC AUTO: 88.7 FL (ref 81.4–97.8)
PLATELET # BLD AUTO: 243 K/UL (ref 164–446)
PMV BLD AUTO: 9.6 FL (ref 9–12.9)
POTASSIUM SERPL-SCNC: 4.2 MMOL/L (ref 3.6–5.5)
PROT SERPL-MCNC: 7.3 G/DL (ref 6–8.2)
RBC # BLD AUTO: 4.59 M/UL (ref 4.2–5.4)
SODIUM SERPL-SCNC: 140 MMOL/L (ref 135–145)
WBC # BLD AUTO: 4.7 K/UL (ref 4.8–10.8)

## 2022-10-01 PROCEDURE — 78452 HT MUSCLE IMAGE SPECT MULT: CPT | Mod: 26 | Performed by: INTERNAL MEDICINE

## 2022-10-01 PROCEDURE — G0378 HOSPITAL OBSERVATION PER HR: HCPCS

## 2022-10-01 PROCEDURE — 93018 CV STRESS TEST I&R ONLY: CPT | Performed by: INTERNAL MEDICINE

## 2022-10-01 PROCEDURE — 83735 ASSAY OF MAGNESIUM: CPT

## 2022-10-01 PROCEDURE — 80053 COMPREHEN METABOLIC PANEL: CPT

## 2022-10-01 PROCEDURE — 93010 ELECTROCARDIOGRAM REPORT: CPT | Performed by: INTERNAL MEDICINE

## 2022-10-01 PROCEDURE — A9502 TC99M TETROFOSMIN: HCPCS

## 2022-10-01 PROCEDURE — 94760 N-INVAS EAR/PLS OXIMETRY 1: CPT

## 2022-10-01 PROCEDURE — 36415 COLL VENOUS BLD VENIPUNCTURE: CPT

## 2022-10-01 PROCEDURE — 85027 COMPLETE CBC AUTOMATED: CPT

## 2022-10-01 PROCEDURE — 99217 PR OBSERVATION CARE DISCHARGE: CPT | Performed by: INTERNAL MEDICINE

## 2022-10-01 ASSESSMENT — PAIN DESCRIPTION - PAIN TYPE: TYPE: ACUTE PAIN

## 2022-10-01 NOTE — H&P
Hospital Medicine History & Physical Note    Date of Service  9/30/2022    Primary Care Physician  MANN Del Cid.    Consultants  None     Code Status  Full Code    Chief Complaint  Chief Complaint   Patient presents with    Chest Pain     Pt. Reports CP started this morning after eating breakfast, reports pain is intermittent, radiating through upper R and upper L chest, and into upper abdomen. Pt. Rates pain as 1/10, denies any radiation to back. Denies vomiting, SOB, dizziness.      History of Presenting Illness  Mandie Juan is a 76 y.o. female with a past medical history of hyperlipidemia, primary hypertension, and gastroesophageal flux disease who presented 9/30/2022 with chest pain.  Patient reports that chest pain started on the morning of admission.The pain is located in the retrosternal area.  Pain is mild-moderate.  No aggravating or relieving factors.  Pain does not radiate to any other places.  Denies associated nausea vomiting diaphoresis, palpitations or shortness of breath.  She denies having fevers, cough, lower extremity pain redness or swelling.  Patient does report having a stress test several years ago.      I discussed the plan of care with emergency department physician, and the patient.    Review of Systems  Review of Systems   Constitutional:  Negative for chills and fever.   Eyes:  Negative for discharge and redness.   Respiratory:  Negative for cough, shortness of breath and stridor.    Cardiovascular:  Positive for chest pain. Negative for leg swelling.   Gastrointestinal:  Negative for abdominal pain and vomiting.   Genitourinary:  Negative for flank pain.   Musculoskeletal:  Negative for myalgias.   Skin: Negative.    Neurological:  Negative for focal weakness.   Endo/Heme/Allergies:  Does not bruise/bleed easily.   Psychiatric/Behavioral:  The patient is not nervous/anxious.      Past Medical History   has a past medical history of Allergy, Basal cell carcinoma (2004),  Cervical polyp, Chronic bilateral low back pain without sciatica (2/16/2022), Colon polyps, DDD (degenerative disc disease), lumbar (2/16/2022), Episode of recurrent major depressive disorder (HCC) (1/25/2021), Generalized anxiety disorder (1/25/2021), GERD without esophagitis, Hyperlipidemia, Hypertension, essential, Impaired fasting glucose, Internal hemorrhoids, Kidney stones, Primary osteoarthritis involving multiple joints (1/7/2021), and Vitamin d deficiency.    Surgical History  No prior surgeries    Family History  family history includes Cancer in her father and paternal grandfather; Heart Disease in her mother; Hypertension in her brother and sister.      Social History   reports that she has never smoked. She has never used smokeless tobacco. She reports current alcohol use of about 2.0 oz per week. She reports that she does not use drugs.    Allergies  Allergies   Allergen Reactions    Compazine     Pcn [Penicillins]     Sulfa Drugs      Medications  Prior to Admission Medications   Prescriptions Last Dose Informant Patient Reported? Taking?   VITAMIN D PO 9/29/2022 at PM Patient Yes Yes   Sig: Take 1 Tablet by mouth every day.   atorvastatin (LIPITOR) 10 MG Tab 9/30/2022 at AM Patient No No   Sig: Take 1 Tablet by mouth every day.   ibuprofen (MOTRIN) 200 MG Tab 9/29/2022 at PM Patient Yes Yes   Sig: Take 400 mg by mouth every 8 hours as needed. Indications: Pain   losartan-hydrochlorothiazide (HYZAAR) 50-12.5 MG per tablet 9/30/2022 at AM Patient No No   Sig: TAKE ONE TABLET BY MOUTH EVERY DAY   omeprazole (PRILOSEC) 20 MG delayed-release capsule 9/30/2022 at AM Patient No No   Sig: Take 1 Capsule by mouth every day.      Facility-Administered Medications: None     Physical Exam  Temp:  [35.9 °C (96.6 °F)-36.6 °C (97.9 °F)] 36.3 °C (97.4 °F)  Pulse:  [60-82] 60  Resp:  [16-18] 17  BP: (131-175)/(51-89) 152/51  SpO2:  [95 %-99 %] 99 %  Blood Pressure : 134/75   Temperature: 36.3 °C (97.4 °F)   Pulse: 77    Respiration: 16   Pulse Oximetry: 95 %     Physical Exam  Constitutional:       General: She is not in acute distress.  HENT:      Head: Normocephalic and atraumatic.      Right Ear: External ear normal.      Left Ear: External ear normal.      Nose: No congestion or rhinorrhea.      Mouth/Throat:      Mouth: Mucous membranes are moist.      Pharynx: No oropharyngeal exudate or posterior oropharyngeal erythema.   Eyes:      General: No scleral icterus.        Right eye: No discharge.         Left eye: No discharge.      Conjunctiva/sclera: Conjunctivae normal.      Pupils: Pupils are equal, round, and reactive to light.   Cardiovascular:      Rate and Rhythm: Normal rate and regular rhythm.      Heart sounds:     No friction rub. No gallop.   Pulmonary:      Effort: Pulmonary effort is normal.   Abdominal:      General: Abdomen is flat. There is no distension.      Tenderness: There is no guarding.   Musculoskeletal:         General: No swelling.      Cervical back: Neck supple. No rigidity. No muscular tenderness.      Right lower leg: No edema.      Left lower leg: No edema.   Skin:     Capillary Refill: Capillary refill takes 2 to 3 seconds.      Coloration: Skin is not jaundiced or pale.      Findings: No bruising or erythema.   Neurological:      Mental Status: She is alert and oriented to person, place, and time.   Psychiatric:         Mood and Affect: Mood normal.         Judgment: Judgment normal.     Laboratory:  Recent Labs     09/30/22  1307   WBC 11.8*   RBC 4.82   HEMOGLOBIN 14.2   HEMATOCRIT 42.0   MCV 87.1   MCH 29.5   MCHC 33.8   RDW 39.7   PLATELETCT 246   MPV 8.9*     Recent Labs     09/30/22  1307   SODIUM 137   POTASSIUM 3.5*   CHLORIDE 100   CO2 26   GLUCOSE 109*   BUN 13   CREATININE 0.73   CALCIUM 9.7     Recent Labs     09/30/22  1307   ALTSGPT 18   ASTSGOT 17   ALKPHOSPHAT 79   TBILIRUBIN 0.6   GLUCOSE 109*         No results for input(s): NTPROBNP in the last 72 hours.      Recent Labs      09/30/22  1307 09/30/22  1617   TROPONINT <6 <6     Imaging:  DX-CHEST-PORTABLE (1 VIEW)   Final Result      No radiographic evidence of acute cardiopulmonary process.      NM-CARDIAC STRESS TEST    (Results Pending)     I patient reviewed patient's EKG, it shows sinus rhythm with a rate of 74, there is T wave inversions in lead I, aVL, V4-V6.  QTc 433.    Assessment/Plan:  Justification for Admission Status  I anticipate this patient is appropriate for observation status at this time because likely discharge after 1 midnight    Patient will need a  bed on telemetry service, patient has chest pain    * Chest pain- (present on admission)  Assessment & Plan  EKG shows a sinus rhythm with a rate of 74, there is T wave inversions in lead I, aVL, V4-V6.  QTc 433.  Initial troponin is within normal limits, I will trend  Stat EKG and troponin for recurrence of chest pain.   Continuous cardiac monitoring.  Plan for stress test in the morning [ordered] as long as there are no significant EKG changes or significant troponin elevation    Hypokalemia- (present on admission)  Assessment & Plan  Replacing, checking magnesium   Continue to monitor replace as needed     Hypertension, essential- (present on admission)  Assessment & Plan  Resume home meds with hold parameters     GERD without esophagitis- (present on admission)  Assessment & Plan  Resume home omeprazole     Hyperlipidemia with target LDL less than 130- (present on admission)  Assessment & Plan  Resume home atorvastatin     VTE prophylaxis: SCDs/TEDs and Xarelto 10 mg daily as prophylaxis

## 2022-10-01 NOTE — PROGRESS NOTES
Telemetry Shift Summary    Rhythm SB - SR   HR Range 52 - 70  Ectopy rPVCs  Measurements 0.18/0.10/0.42        Normal Values  Rhythm SR  HR Range    Measurements 0.12-0.20 / 0.06-0.10  / 0.30-0.52

## 2022-10-01 NOTE — PROGRESS NOTES
4 Eyes Skin Assessment Completed by Juhi Taylor, BABAK and BABAK Coffman.    Head WDL  Ears WDL  Nose WDL  Mouth WDL  Neck WDL  Breast/Chest WDL  Shoulder Blades WDL  Spine WDL  (R) Arm/Elbow/Hand WDL  (L) Arm/Elbow/Hand WDL  Abdomen WDL  Groin WDL  Scrotum/Coccyx/Buttocks WDL  (R) Leg WDL  (L) Leg WDL  (R) Heel/Foot/Toe WDL  (L) Heel/Foot/Toe WDL          Devices In Places Tele Box      Interventions In Place N/A    Possible Skin Injury No    Pictures Uploaded Into Epic N/A  Wound Consult Placed N/A  RN Wound Prevention Protocol Ordered No

## 2022-10-01 NOTE — PROGRESS NOTES
Received report from night shift RN. Assumed patient care at 0715. Assessment completed. A&OX4. Pain 2/10, chest pain, called for STAT EKG per PRN order.      NPO for stress test today at 1000. Patient updated on POC.    Patient ambulates up self with a steady gait.  Bed in lowest position, bed locked, treaded socks in place, RN and CNA numbers provided, call light within reach. Hourly rounding in place.

## 2022-10-01 NOTE — PROGRESS NOTES
Pt up to gurney to unit. Pt able to ambulate to bed with no assistance. Pt family at bedside. Pt and family updated on POC. Pt and family has no further needs or questions at this time. Call light with in reach.

## 2022-10-01 NOTE — PROGRESS NOTES
Discharge orders received. IV discontinued. Instructions, prescriptions and education given to patient. Follow up appointments discussed.  Patient verbalizes understanding of dc instructions and prescriptions, including taking an 81 mg aspirin daily per MD recommendation. Patient signed discharge instructions. Patient verbalized she had all belongings, medications returned to patient from pharmacy. Patient was ambulatory for discharge with daughter.

## 2022-10-01 NOTE — DISCHARGE SUMMARY
"Discharge Summary    CHIEF COMPLAINT ON ADMISSION  Chief Complaint   Patient presents with    Chest Pain     Pt. Reports CP started this morning after eating breakfast, reports pain is intermittent, radiating through upper R and upper L chest, and into upper abdomen. Pt. Rates pain as 1/10, denies any radiation to back. Denies vomiting, SOB, dizziness.        Reason for Admission  chest pain     Admission Date  9/30/2022    CODE STATUS  Full Code    HPI & HOSPITAL COURSE  Per notes, \"76 y.o. female with a past medical history of hyperlipidemia, primary hypertension, and gastroesophageal flux disease who presented 9/30/2022 with chest pain.  Patient reports that chest pain started on the morning of admission.The pain is located in the retrosternal area.  Pain is mild-moderate.  No aggravating or relieving factors.  Pain does not radiate to any other places.  Denies associated nausea vomiting diaphoresis, palpitations or shortness of breath.  She denies having fevers, cough, lower extremity pain redness or swelling.  Patient does report having a stress test several years ago. \"    Patient was admitted and monitored overnight for chest pain.  EKG and troponins were unremarkable for acute findings.  Patient underwent a stress test on the morning 10/1 which was negative for acute ischemia.  I recommended patient continue to monitor symptoms closely and follow-up with PCP in the outpatient setting should she have recurrence of symptoms.    Therefore, she is discharged in good and stable condition to home with organized home healthcare and close outpatient follow-up.    The patient recovered much more quickly than anticipated on admission.    Discharge Date  10/1/2022    FOLLOW UP ITEMS POST DISCHARGE  FU with PCP     DISCHARGE DIAGNOSES  Principal Problem:    Chest pain POA: Yes  Active Problems:    Hyperlipidemia with target LDL less than 130 POA: Yes    Hypertension, essential POA: Yes    GERD without esophagitis POA: " Yes    Hypokalemia POA: Yes  Resolved Problems:    * No resolved hospital problems. *      FOLLOW UP  No future appointments.  No follow-up provider specified.    MEDICATIONS ON DISCHARGE     Medication List        START taking these medications        Instructions   sucralfate 1 GM/10ML Susp  Commonly known as: CARAFATE   Take 10 mL by mouth 4 times a day.  Dose: 1 g            ASK your doctor about these medications        Instructions   atorvastatin 10 MG Tabs  Commonly known as: LIPITOR   Take 1 Tablet by mouth every day.  Dose: 10 mg     ibuprofen 200 MG Tabs  Commonly known as: MOTRIN   Take 400 mg by mouth every 8 hours as needed. Indications: Pain  Dose: 400 mg     losartan-hydrochlorothiazide 50-12.5 MG per tablet  Commonly known as: HYZAAR   TAKE ONE TABLET BY MOUTH EVERY DAY     omeprazole 20 MG delayed-release capsule  Commonly known as: PRILOSEC   Take 1 Capsule by mouth every day.  Dose: 20 mg     VITAMIN D PO   Take 1 Tablet by mouth every day.  Dose: 1 Tablet              Allergies  Allergies   Allergen Reactions    Compazine     Pcn [Penicillins]     Sulfa Drugs        DIET  Orders Placed This Encounter   Procedures    Diet Order Diet: Regular     Standing Status:   Standing     Number of Occurrences:   1     Order Specific Question:   Diet:     Answer:   Regular [1]       ACTIVITY  As tolerated.  Weight bearing as tolerated    CONSULTATIONS  None    PROCEDURES  None    LABORATORY  Lab Results   Component Value Date    SODIUM 140 10/01/2022    POTASSIUM 4.2 10/01/2022    CHLORIDE 103 10/01/2022    CO2 27 10/01/2022    GLUCOSE 113 (H) 10/01/2022    BUN 11 10/01/2022    CREATININE 0.60 10/01/2022    CREATININE 0.68 11/21/2012    GLOMRATE >59 03/23/2011        Lab Results   Component Value Date    WBC 4.7 (L) 10/01/2022    WBC 4.3 05/07/2010    HEMOGLOBIN 13.8 10/01/2022    HEMATOCRIT 40.7 10/01/2022    PLATELETCT 243 10/01/2022        Total time of the discharge process exceeds 38 minutes.

## 2022-10-01 NOTE — CARE PLAN
The patient is Stable - Low risk of patient condition declining or worsening    Shift Goals  Clinical Goals: Stress test AM  Patient Goals: Rest  Family Goals: NNEKA    Progress made toward(s) clinical / shift goals:    Problem: Pain - Standard  Goal: Alleviation of pain or a reduction in pain to the patient’s comfort goal  Outcome: Progressing: pt pain remained under 5 throughout shift     Problem: Knowledge Deficit - Standard  Goal: Patient and family/care givers will demonstrate understanding of plan of care, disease process/condition, diagnostic tests and medications  Outcome: Progressing: Pt verbalized understanding of POC     Problem: Mobility  Goal: Patient's capacity to carry out activities will improve  Outcome: Progressing: pt able to carry out ADLs with no assistance

## 2022-10-01 NOTE — CARE PLAN
The patient is Stable - Low risk of patient condition declining or worsening    Shift Goals  Clinical Goals: Monitor for chest pain, stress test  Patient Goals: rest  Family Goals: NNEKA    Progress made toward(s) clinical / shift goals:    Problem: Pain - Standard  Goal: Alleviation of pain or a reduction in pain to the patient’s comfort goal  Outcome: Progressing  Patient denies chest pain currently     Problem: Knowledge Deficit - Standard  Goal: Patient and family/care givers will demonstrate understanding of plan of care, disease process/condition, diagnostic tests and medications  Outcome: Progressing     Problem: Mobility  Goal: Patient's capacity to carry out activities will improve  Outcome: Progressing  Patient ambulates up self with a  steady gait.

## 2022-10-01 NOTE — ASSESSMENT & PLAN NOTE
EKG shows a sinus rhythm with a rate of 74, there is T wave inversions in lead I, aVL, V4-V6.  QTc 433.  Initial troponin is within normal limits, I will trend  Stat EKG and troponin for recurrence of chest pain.   Continuous cardiac monitoring.  Plan for stress test in the morning [ordered] as long as there are no significant EKG changes or significant troponin elevation

## 2022-10-03 ENCOUNTER — PATIENT OUTREACH (OUTPATIENT)
Dept: MEDICAL GROUP | Facility: MEDICAL CENTER | Age: 77
End: 2022-10-03
Payer: MEDICARE

## 2022-10-03 NOTE — PROGRESS NOTES
Subjective:     Mandie Juan is a 76 y.o. female who presents for Hospital Follow-up.    POST DISCHARGE CALL:  Discharge Date:10/1/2022   Date of Outreach Call: 10/3/2022  1:31 PM  Now that you're home, how are you doing? Fair  Did you receive any new prescriptions? Yes  Were you able to fill those medications? Yes  How did you fill those prescriptions? Pharmacy  If not able to fill prescriptions, why? *    Do you have questions about your medications? Yes  Comment:reviewed instructions on how to take  Do you have a follow-up appointment scheduled?Yes  Any issues or paperwork you wish to discuss with your PCP? no  Does patient qualify for CCM Program? No  If patient qualifies, was CCM Program Introduced? *  If patient does not qualify, comment? *  Number of Attempts: 1  Current or previous attempts completed within two business days of discharge? Yes  Provided education regarding treatment plan, medication, self-management, ADLs? Yes  Has patient completed Advance Directive? If yes, advise them to bring to appointment. Yes  Care Manager phone number provided? Yes  Is there anything else I can help you with? No  Chief Complaint? chest pain  Admitting Dx? epigastric pain  Discharge Diagnosis? chest pain, GERD  Additional Comments? still having some heartburn. picked up medication this am. pt was told to start 81mg aspirin daily. will discuss further with pcp    HPI:   Recently hospitalized for GERD/CHEST Pain.      Current medicines (including reconciliation performed today)  Current Outpatient Medications   Medication Sig Dispense Refill    VITAMIN D PO Take 1 Tablet by mouth every day.      ibuprofen (MOTRIN) 200 MG Tab Take 400 mg by mouth every 8 hours as needed. Indications: Pain      sucralfate (CARAFATE) 1 GM/10ML Suspension Take 10 mL by mouth 4 times a day. 414 mL 3    omeprazole (PRILOSEC) 20 MG delayed-release capsule Take 1 Capsule by mouth every day. 90 Capsule 3    atorvastatin (LIPITOR) 10 MG  Tab Take 1 Tablet by mouth every day. 90 Tablet 3    losartan-hydrochlorothiazide (HYZAAR) 50-12.5 MG per tablet TAKE ONE TABLET BY MOUTH EVERY DAY 90 Tablet 3     No current facility-administered medications for this visit.       Allergies:   Compazine, Pcn [penicillins], and Sulfa drugs    Social History     Tobacco Use    Smoking status: Never    Smokeless tobacco: Never   Vaping Use    Vaping Use: Never used   Substance Use Topics    Alcohol use: Yes     Alcohol/week: 2.0 oz     Types: 4 Glasses of wine per week    Drug use: No       Objective:     There were no vitals filed for this visit.  There is no height or weight on file to calculate BMI.    Assessment and Plan:   There are no diagnoses linked to this encounter.    - Chart and discharge summary were reviewed.   - Hospitalization and results reviewed with patient.   - Medications reviewed including instructions regarding high risk medications, dosing and side effects.  - Recommended Services: No services needed at this time  -    Face-to-face transitional care management services with [facility] (today's visit is within 14 days post discharge & LACE+ score of 28-58) medical decision complexity were provided.     LACE+ Historical Score Over Time (0-28: Low, 29-58: Medium, 59+: High): 23

## 2022-10-12 ENCOUNTER — OFFICE VISIT (OUTPATIENT)
Dept: MEDICAL GROUP | Facility: MEDICAL CENTER | Age: 77
End: 2022-10-12
Payer: MEDICARE

## 2022-10-12 VITALS
OXYGEN SATURATION: 95 % | WEIGHT: 163 LBS | RESPIRATION RATE: 16 BRPM | HEART RATE: 75 BPM | HEIGHT: 65 IN | BODY MASS INDEX: 27.16 KG/M2 | DIASTOLIC BLOOD PRESSURE: 90 MMHG | TEMPERATURE: 97.4 F | SYSTOLIC BLOOD PRESSURE: 140 MMHG

## 2022-10-12 DIAGNOSIS — K21.9 GERD WITHOUT ESOPHAGITIS: ICD-10-CM

## 2022-10-12 DIAGNOSIS — I10 HYPERTENSION, ESSENTIAL: ICD-10-CM

## 2022-10-12 DIAGNOSIS — F41.9 ANXIETY: ICD-10-CM

## 2022-10-12 DIAGNOSIS — R73.01 IMPAIRED FASTING GLUCOSE: ICD-10-CM

## 2022-10-12 DIAGNOSIS — E55.9 VITAMIN D DEFICIENCY: ICD-10-CM

## 2022-10-12 PROCEDURE — 99214 OFFICE O/P EST MOD 30 MIN: CPT | Performed by: NURSE PRACTITIONER

## 2022-10-12 RX ORDER — LOSARTAN POTASSIUM AND HYDROCHLOROTHIAZIDE 12.5; 5 MG/1; MG/1
TABLET ORAL
Qty: 90 TABLET | Refills: 3 | Status: SHIPPED | OUTPATIENT
Start: 2022-10-12 | End: 2023-09-28

## 2022-10-12 RX ORDER — BUSPIRONE HYDROCHLORIDE 10 MG/1
TABLET ORAL
Qty: 60 TABLET | Refills: 1 | Status: SHIPPED
Start: 2022-10-12 | End: 2023-05-04

## 2022-10-12 ASSESSMENT — FIBROSIS 4 INDEX: FIB4 SCORE: 1.39

## 2022-10-12 NOTE — NON-PROVIDER
CC: Follow up for chest pain.     HPI: 77 year old female here in follow up for chest pain. She was admitted 9/30/22 for 1 night to telemetry for chest pain, had negative stress test. Then went to [place] for a week, arrived home yesterday. Felt good on vacation, no episodes of chest pain since leaving the hospital. Has noticed some episodes of GERD if she eats later at night. Chilli, pizza, mexican food make GERD worse. Currently on Prilosec 20 mg daily and Carafate since leaving hospital.   She has an appointment with digestive health today at 9 am.     Stressed due to money, cost of lving, and stressing about her daughter and grand daughter. Was previously on zolfot and did not like it, feels like it gave her restless leg syndrome.     Labs reviewed including A1c down at 5.9, vitamin D 25, and vitamin B wnl.     Review of Systems  Constitutional: Negative for chills and fever. Denies unintentional weight loss.   HENT: Negative for congestion. No sore throat. No eye drainage.   Respiratory: Negative for cough and shortness of breath.  Cardiovascular: Negative for chest pain, palpitations, orthopnea, leg swelling and PND.  Gastrointenstinal: Negative for abdominal pain and nausea.   Genitourinary: Negative for dysuria, hematuria, urinary urgency/frequency.   Musculoskeletal: Negative for myalgias  Skin: Negative for rash.  Neurological: Negative for dizziness, loss of consciousness and headaches.   Endo/Heme/Allergies: Does not bruise/bleed easily.   Psychiatric/Behavioral: Denies insomnia.      Constitutional:  Appearance: Well developed, no acute distress.   HENT:   Head: Normocephalic  Neck: No JVD  Cardiovascular:  Rate and Rhythm: Normal rate and regular rhythm.   Heart sounds: normal heart sounds  Pulmonary:  Effort: Pulmonary effort is normal. No respiratory distress.  Breath sounds: Normal breath sounds. No wheezing or rales.   Abdominal:   General: Bowel sounds are normal. There is no distention.    Palpation: abdomen is soft.  Tenderness: There is no abdominal tenderness.   Musculoskeletal:   General: Normal range of motion.   Cervical back: normal range of motion and neck supple.   Skin:   General: Skin is warm and dry.   Findings: No rash  Neurological:  Mental status: She is alert and oriented to person, place, and time.      1. Anxiety      Start Buspar 10 mg. Follow up if symptoms not improving with medication.       2. GERD without esophagitis      Continue prilosec 20 mg. Follow up with [facility] as scheduled today. Follow recommendations of GI.       3. Impaired fasting glucose      AIC down to 5.9 continue healthy diet. Recheck in 5 months.       4. Vitamin D deficiency      Continue vitamin D supplement.        Follow up in 5 months.

## 2022-10-12 NOTE — PROGRESS NOTES
Subjective:     Mandie Juan is a 77 y.o. female who presents with GERD.    HPI:   77 year old female here in follow up after ER visit for chest pain. She was admitted 9/30/22 for 1 night chest pain.  She had negative stress test and troponin's were wnl. She then went to [place] for a week, arrived home yesterday. Felt good on vacation, no episodes of chest pain since leaving the hospital. Has noticed some episodes of GERD if she eats later at night. Chilli, pizza, mexican food make GERD worse. Currently on Prilosec 20 mg daily and Carafate since leaving hospital.  She has an appointment with digestive health today at 9 am.   Stressed due to money, cost of lving, and stressing about her daughter and grand daughter. Was previously on zoloft and did not like it, feels like it gave her restless leg syndrome.  She is not sleeping well.  Feels that she could benefit from another med.  Labs reviewed with pt.  A1c down at 5.9.   vitamin D 25, vitamin B12 wnl.   GFR, CBCCMP from hospital wnl except glucose elevated.  Seh was nonfasting.      Patient Active Problem List    Diagnosis Date Noted    Chronic bilateral low back pain without sciatica 02/16/2022    DDD (degenerative disc disease), lumbar 02/16/2022    Recurrent major depressive disorder (HCC) 01/25/2021    Generalized anxiety disorder 01/25/2021    Primary osteoarthritis involving multiple joints 01/07/2021    Hypertension, essential     GERD without esophagitis     Impaired fasting glucose     Internal hemorrhoids     Allergic rhinitis due to allergen 02/21/2014    Basal cell carcinoma     Vitamin D deficiency 11/03/2011    Hyperlipidemia with target LDL less than 130 11/03/2011    Cervical polyp     Kidney stones     Colon polyps        Current medicines (including changes today)  Current Outpatient Medications   Medication Sig Dispense Refill    busPIRone (BUSPAR) 10 MG Tab tablet Take one at bed time, if tolerated increase to twice a day. 60 Tablet  "1    VITAMIN D PO Take 1 Tablet by mouth every day.      ibuprofen (MOTRIN) 200 MG Tab Take 400 mg by mouth every 8 hours as needed. Indications: Pain      sucralfate (CARAFATE) 1 GM/10ML Suspension Take 10 mL by mouth 4 times a day. 414 mL 3    omeprazole (PRILOSEC) 20 MG delayed-release capsule Take 1 Capsule by mouth every day. 90 Capsule 3    atorvastatin (LIPITOR) 10 MG Tab Take 1 Tablet by mouth every day. 90 Tablet 3    losartan-hydrochlorothiazide (HYZAAR) 50-12.5 MG per tablet TAKE ONE TABLET BY MOUTH EVERY DAY 90 Tablet 3     No current facility-administered medications for this visit.       Allergies   Allergen Reactions    Compazine     Pcn [Penicillins]     Sulfa Drugs        ROS  Constitutional: Negative. Negative for fever, chills, weight loss, malaise/fatigue and diaphoresis.   HENT: Negative. Negative for hearing loss, ear pain, nosebleeds, congestion, sore throat, neck pain, tinnitus and ear discharge.   Respiratory: Negative. Negative for cough, hemoptysis, sputum production, shortness of breath, wheezing and stridor.   Cardiovascular: Negative. Negative for chest pain, palpitations, orthopnea, claudication, leg swelling and PND.   Gastrointestinal: Denies nausea, vomiting, diarrhea, constipation, heartburn, melena or hematochezia.  Genitourinary: Denies dysuria, hematuria, urinary incontinence, frequency or urgency.        Objective:     BP (!) 140/90   Pulse 75   Temp 36.3 °C (97.4 °F) (Temporal)   Resp 16   Ht 1.651 m (5' 5\")   Wt 73.9 kg (163 lb)   SpO2 95%  Body mass index is 27.12 kg/m².    Physical Exam:  Vitals reviewed.  Constitutional: Oriented to person, place, and time. appears well-developed and well-nourished. No distress.   Cardiovascular: Normal rate, regular rhythm, normal heart sounds and intact distal pulses. Exam reveals no gallop and no friction rub. No murmur heard. No carotid bruits.   Pulmonary/Chest: Effort normal and breath sounds normal. No stridor. No respiratory " distress. no wheezes or rales. exhibits no tenderness.   Musculoskeletal: Normal range of motion. exhibits no edema. osbaldo pedal pulses 2+.  Lymphadenopathy: No cervical or supraclavicular adenopathy.   Neurological: Alert and oriented to person, place, and time. exhibits normal muscle tone.  Skin: Skin is warm and dry. No diaphoresis.   Psychiatric: Normal mood and affect. Behavior is normal.      Assessment and Plan:     The following treatment plan was discussed:    1. GERD without esophagitis      Continue prilosec 20 mg. Follow up with [facility] as scheduled today. Follow recommendations of GI.       2. Anxiety      Start Buspar 10 mg hs x 7 days then bid if reece.  F/u if symptoms not improving with medication.       3. Impaired fasting glucose      AIC down to 5.9 continue healthy diet. Recheck in 5 months.       4. Vitamin D deficiency      Continue vitamin D supplement.             Followup: 5 months or sooner if buspar not helping sx.  Email for lab slip

## 2023-02-27 ENCOUNTER — APPOINTMENT (OUTPATIENT)
Dept: RADIOLOGY | Facility: MEDICAL CENTER | Age: 78
End: 2023-02-27
Attending: NURSE PRACTITIONER
Payer: MEDICARE

## 2023-03-09 DIAGNOSIS — E78.5 HYPERLIPIDEMIA WITH TARGET LDL LESS THAN 130: ICD-10-CM

## 2023-03-09 RX ORDER — ATORVASTATIN CALCIUM 10 MG/1
10 TABLET, FILM COATED ORAL
Qty: 100 TABLET | Refills: 0 | Status: SHIPPED | OUTPATIENT
Start: 2023-03-09 | End: 2023-04-05

## 2023-03-14 ENCOUNTER — HOSPITAL ENCOUNTER (OUTPATIENT)
Dept: LAB | Facility: MEDICAL CENTER | Age: 78
End: 2023-03-14
Payer: MEDICARE

## 2023-03-14 LAB
25(OH)D3 SERPL-MCNC: 23 NG/ML (ref 30–100)
FOLATE SERPL-MCNC: 7.5 NG/ML
TSH SERPL DL<=0.005 MIU/L-ACNC: 2.89 UIU/ML (ref 0.38–5.33)
VIT B12 SERPL-MCNC: 395 PG/ML (ref 211–911)

## 2023-03-14 PROCEDURE — 82306 VITAMIN D 25 HYDROXY: CPT

## 2023-03-14 PROCEDURE — 84443 ASSAY THYROID STIM HORMONE: CPT

## 2023-03-14 PROCEDURE — 36415 COLL VENOUS BLD VENIPUNCTURE: CPT

## 2023-03-14 PROCEDURE — 82746 ASSAY OF FOLIC ACID SERUM: CPT

## 2023-03-14 PROCEDURE — 82607 VITAMIN B-12: CPT

## 2023-03-15 ENCOUNTER — HOSPITAL ENCOUNTER (OUTPATIENT)
Dept: RADIOLOGY | Facility: MEDICAL CENTER | Age: 78
End: 2023-03-15
Attending: NURSE PRACTITIONER
Payer: MEDICARE

## 2023-03-15 DIAGNOSIS — Z12.31 VISIT FOR SCREENING MAMMOGRAM: ICD-10-CM

## 2023-03-15 PROCEDURE — 77063 BREAST TOMOSYNTHESIS BI: CPT

## 2023-04-05 DIAGNOSIS — E78.5 HYPERLIPIDEMIA WITH TARGET LDL LESS THAN 130: ICD-10-CM

## 2023-04-05 RX ORDER — ATORVASTATIN CALCIUM 10 MG/1
TABLET, FILM COATED ORAL
Qty: 30 TABLET | Refills: 0 | Status: SHIPPED | OUTPATIENT
Start: 2023-04-05 | End: 2023-05-04 | Stop reason: SDUPTHER

## 2023-04-14 ENCOUNTER — HOSPITAL ENCOUNTER (OUTPATIENT)
Dept: LAB | Facility: MEDICAL CENTER | Age: 78
End: 2023-04-14
Attending: NURSE PRACTITIONER
Payer: MEDICARE

## 2023-04-14 DIAGNOSIS — I10 HYPERTENSION, ESSENTIAL: ICD-10-CM

## 2023-04-14 DIAGNOSIS — R53.83 FATIGUE, UNSPECIFIED TYPE: ICD-10-CM

## 2023-04-14 DIAGNOSIS — R73.01 IMPAIRED FASTING GLUCOSE: ICD-10-CM

## 2023-04-14 DIAGNOSIS — E55.9 VITAMIN D DEFICIENCY: ICD-10-CM

## 2023-04-14 DIAGNOSIS — E78.5 HYPERLIPIDEMIA WITH TARGET LDL LESS THAN 130: ICD-10-CM

## 2023-04-14 LAB
25(OH)D3 SERPL-MCNC: 40 NG/ML (ref 30–100)
ALBUMIN SERPL BCP-MCNC: 4 G/DL (ref 3.2–4.9)
ALBUMIN/GLOB SERPL: 1.3 G/DL
ALP SERPL-CCNC: 80 U/L (ref 30–99)
ALT SERPL-CCNC: 18 U/L (ref 2–50)
ANION GAP SERPL CALC-SCNC: 10 MMOL/L (ref 7–16)
AST SERPL-CCNC: 19 U/L (ref 12–45)
BILIRUB SERPL-MCNC: 0.3 MG/DL (ref 0.1–1.5)
BUN SERPL-MCNC: 19 MG/DL (ref 8–22)
CALCIUM ALBUM COR SERPL-MCNC: 9.5 MG/DL (ref 8.5–10.5)
CALCIUM SERPL-MCNC: 9.5 MG/DL (ref 8.5–10.5)
CHLORIDE SERPL-SCNC: 104 MMOL/L (ref 96–112)
CHOLEST SERPL-MCNC: 180 MG/DL (ref 100–199)
CO2 SERPL-SCNC: 26 MMOL/L (ref 20–33)
CREAT SERPL-MCNC: 0.79 MG/DL (ref 0.5–1.4)
EST. AVERAGE GLUCOSE BLD GHB EST-MCNC: 128 MG/DL
GFR SERPLBLD CREATININE-BSD FMLA CKD-EPI: 76 ML/MIN/1.73 M 2
GLOBULIN SER CALC-MCNC: 3 G/DL (ref 1.9–3.5)
GLUCOSE SERPL-MCNC: 110 MG/DL (ref 65–99)
HBA1C MFR BLD: 6.1 % (ref 4–5.6)
HDLC SERPL-MCNC: 72 MG/DL
LDLC SERPL CALC-MCNC: 93 MG/DL
POTASSIUM SERPL-SCNC: 3.7 MMOL/L (ref 3.6–5.5)
PROT SERPL-MCNC: 7 G/DL (ref 6–8.2)
SODIUM SERPL-SCNC: 140 MMOL/L (ref 135–145)
TRIGL SERPL-MCNC: 75 MG/DL (ref 0–149)

## 2023-04-14 PROCEDURE — 82306 VITAMIN D 25 HYDROXY: CPT

## 2023-04-14 PROCEDURE — 82570 ASSAY OF URINE CREATININE: CPT

## 2023-04-14 PROCEDURE — 80053 COMPREHEN METABOLIC PANEL: CPT

## 2023-04-14 PROCEDURE — 36415 COLL VENOUS BLD VENIPUNCTURE: CPT

## 2023-04-14 PROCEDURE — 80061 LIPID PANEL: CPT

## 2023-04-14 PROCEDURE — 83036 HEMOGLOBIN GLYCOSYLATED A1C: CPT

## 2023-04-14 PROCEDURE — 82043 UR ALBUMIN QUANTITATIVE: CPT

## 2023-04-15 LAB
CREAT UR-MCNC: 186.59 MG/DL
MICROALBUMIN UR-MCNC: <1.2 MG/DL
MICROALBUMIN/CREAT UR: NORMAL MG/G (ref 0–30)

## 2023-04-25 DIAGNOSIS — F41.8 DEPRESSION WITH ANXIETY: ICD-10-CM

## 2023-04-25 RX ORDER — LORAZEPAM 0.5 MG/1
0.5 TABLET ORAL ONCE
Qty: 1 TABLET | Refills: 0 | Status: SHIPPED | OUTPATIENT
Start: 2023-04-25 | End: 2023-04-25

## 2023-05-04 ENCOUNTER — OFFICE VISIT (OUTPATIENT)
Dept: MEDICAL GROUP | Facility: MEDICAL CENTER | Age: 78
End: 2023-05-04
Payer: MEDICARE

## 2023-05-04 VITALS
TEMPERATURE: 98 F | HEIGHT: 64 IN | OXYGEN SATURATION: 97 % | SYSTOLIC BLOOD PRESSURE: 152 MMHG | RESPIRATION RATE: 16 BRPM | DIASTOLIC BLOOD PRESSURE: 76 MMHG | WEIGHT: 161 LBS | HEART RATE: 67 BPM | BODY MASS INDEX: 27.49 KG/M2

## 2023-05-04 DIAGNOSIS — K21.9 GASTROESOPHAGEAL REFLUX DISEASE WITHOUT ESOPHAGITIS: ICD-10-CM

## 2023-05-04 DIAGNOSIS — Z23 NEED FOR PNEUMOCOCCAL 20-VALENT CONJUGATE VACCINATION: ICD-10-CM

## 2023-05-04 DIAGNOSIS — R73.01 IFG (IMPAIRED FASTING GLUCOSE): ICD-10-CM

## 2023-05-04 DIAGNOSIS — Z78.9 VARICELLA VACCINATION STATUS UNKNOWN: ICD-10-CM

## 2023-05-04 DIAGNOSIS — E53.8 DISORDER OF VITAMIN B12: ICD-10-CM

## 2023-05-04 DIAGNOSIS — E55.9 VITAMIN D DEFICIENCY: ICD-10-CM

## 2023-05-04 DIAGNOSIS — E78.5 HYPERLIPIDEMIA WITH TARGET LDL LESS THAN 130: ICD-10-CM

## 2023-05-04 DIAGNOSIS — R41.3 MEMORY LOSS OF UNKNOWN CAUSE: ICD-10-CM

## 2023-05-04 DIAGNOSIS — I10 HYPERTENSION, ESSENTIAL: ICD-10-CM

## 2023-05-04 PROCEDURE — G0009 ADMIN PNEUMOCOCCAL VACCINE: HCPCS | Performed by: NURSE PRACTITIONER

## 2023-05-04 PROCEDURE — 90677 PCV20 VACCINE IM: CPT | Performed by: NURSE PRACTITIONER

## 2023-05-04 PROCEDURE — 99214 OFFICE O/P EST MOD 30 MIN: CPT | Mod: 25 | Performed by: NURSE PRACTITIONER

## 2023-05-04 RX ORDER — ATORVASTATIN CALCIUM 10 MG/1
10 TABLET, FILM COATED ORAL
Qty: 90 TABLET | Refills: 3 | Status: SHIPPED | OUTPATIENT
Start: 2023-05-04

## 2023-05-04 RX ORDER — OMEPRAZOLE 20 MG/1
20 CAPSULE, DELAYED RELEASE ORAL
Qty: 90 CAPSULE | Refills: 3 | Status: SHIPPED
Start: 2023-05-04 | End: 2024-02-07

## 2023-05-04 ASSESSMENT — PATIENT HEALTH QUESTIONNAIRE - PHQ9
8. MOVING OR SPEAKING SO SLOWLY THAT OTHER PEOPLE COULD HAVE NOTICED. OR THE OPPOSITE, BEING SO FIGETY OR RESTLESS THAT YOU HAVE BEEN MOVING AROUND A LOT MORE THAN USUAL: NOT AT ALL
7. TROUBLE CONCENTRATING ON THINGS, SUCH AS READING THE NEWSPAPER OR WATCHING TELEVISION: SEVERAL DAYS
3. TROUBLE FALLING OR STAYING ASLEEP OR SLEEPING TOO MUCH: NOT AT ALL
2. FEELING DOWN, DEPRESSED, IRRITABLE, OR HOPELESS: NOT AT ALL
4. FEELING TIRED OR HAVING LITTLE ENERGY: NOT AT ALL
SUM OF ALL RESPONSES TO PHQ QUESTIONS 1-9: 2
9. THOUGHTS THAT YOU WOULD BE BETTER OFF DEAD, OR OF HURTING YOURSELF: NOT AT ALL
1. LITTLE INTEREST OR PLEASURE IN DOING THINGS: SEVERAL DAYS
6. FEELING BAD ABOUT YOURSELF - OR THAT YOU ARE A FAILURE OR HAVE LET YOURSELF OR YOUR FAMILY DOWN: NOT AL ALL
5. POOR APPETITE OR OVEREATING: NOT AT ALL
SUM OF ALL RESPONSES TO PHQ9 QUESTIONS 1 AND 2: 1

## 2023-05-04 ASSESSMENT — FIBROSIS 4 INDEX: FIB4 SCORE: 1.46

## 2023-05-04 NOTE — PROGRESS NOTES
Subjective:     Mandie Juan is a 77 y.o. female who presents with IFG.    HPI:   Seen in f/u for iFG .  She is now on clindamycin after having a tooth extracted.  She had the infection for several months.  She is concerned that her 1/2 glass of wine is contraindicated withe the antibx.  Pt reassured that 1/2 glass is fine.   Her bp is elevated again today for last 3 visits. However last time was after hospital.  Today she was stressed d/t home issues.   She needs refill on her omeprazole.  She has changed her diet after her GERD and constipation issues.  She had lots of problems with that from the multiple bm's daily with gas and urgency.  Now she is using omeprazol, pepcid.  She uses stool softener and miralax if needed. Using metamucil with probitic daily. There are multiple foods that she cannot eat orange marmalade, tomatoes and chocolate.  She is now starting to feel better and be more normal since october.   She is due prevnar 20 today  She doesn't know if she had chicken pox. Due to that she has not gotten the shingrix vaccine.  Reviewed lab with pt.  GFR, corrected ca++, CMP, alb/cr ratio, LP, vitamin d, TSH, folate is wnl.  B12 is chronically low normal in 300's.  Not on otc supplement.  A1c is up from 5.9 to 6.1.  She has chronically low vitamin d that is finally wnl at 40.  She is on otc supplement.  She is noting some mild memory loss.  Only got lost once when she was coming to ER and very ill.  Mild memory loss.       Patient Active Problem List    Diagnosis Date Noted    Chronic bilateral low back pain without sciatica 02/16/2022    DDD (degenerative disc disease), lumbar 02/16/2022    Recurrent major depressive disorder (HCC) 01/25/2021    Generalized anxiety disorder 01/25/2021    Primary osteoarthritis involving multiple joints 01/07/2021    Hypertension, essential     GERD without esophagitis     Impaired fasting glucose     Internal hemorrhoids     Allergic rhinitis due to allergen  "02/21/2014    Basal cell carcinoma     Vitamin D deficiency 11/03/2011    Hyperlipidemia with target LDL less than 130 11/03/2011    Cervical polyp     Kidney stones     Colon polyps        Current medicines (including changes today)  Current Outpatient Medications   Medication Sig Dispense Refill    omeprazole (PRILOSEC) 20 MG delayed-release capsule Take 1 Capsule by mouth every day. 90 Capsule 3    atorvastatin (LIPITOR) 10 MG Tab Take 1 Tablet by mouth every day. 90 Tablet 3    losartan-hydrochlorothiazide (HYZAAR) 50-12.5 MG per tablet TAKE ONE TABLET BY MOUTH EVERY DAY 90 Tablet 3    VITAMIN D PO Take 1 Tablet by mouth every day.      ibuprofen (MOTRIN) 200 MG Tab Take 400 mg by mouth every 8 hours as needed. Indications: Pain      sucralfate (CARAFATE) 1 GM/10ML Suspension Take 10 mL by mouth 4 times a day. 414 mL 3     No current facility-administered medications for this visit.       Allergies   Allergen Reactions    Compazine     Pcn [Penicillins]     Sulfa Drugs        ROS  Constitutional: Negative. Negative for fever, chills, weight loss, malaise/fatigue and diaphoresis.   HENT: Negative. Negative for hearing loss, ear pain, nosebleeds, congestion, sore throat, neck pain, tinnitus and ear discharge.   Respiratory: Negative. Negative for cough, hemoptysis, sputum production, shortness of breath, wheezing and stridor.   Cardiovascular: Negative. Negative for chest pain, palpitations, orthopnea, claudication, leg swelling and PND.   Gastrointestinal: Denies nausea, vomiting, diarrhea, constipation, heartburn, melena or hematochezia.  Genitourinary: Denies dysuria, hematuria, urinary incontinence, frequency or urgency.        Objective:     BP (!) 152/76 (BP Location: Right arm, Patient Position: Sitting, BP Cuff Size: Adult)   Pulse 67   Temp 36.7 °C (98 °F) (Temporal)   Resp 16   Ht 1.626 m (5' 4\")   Wt 73 kg (161 lb)   SpO2 97%  Body mass index is 27.64 kg/m².    Physical Exam:  Vitals " reviewed.  Constitutional: Oriented to person, place, and time. appears well-developed and well-nourished. No distress.   Cardiovascular: Normal rate, regular rhythm, normal heart sounds and intact distal pulses. Exam reveals no gallop and no friction rub. 2/6 systolic murmur heard. No carotid bruits.   Pulmonary/Chest: Effort normal and breath sounds normal. No stridor. No respiratory distress. no wheezes or rales. exhibits no tenderness.   Musculoskeletal: Normal range of motion. exhibits no edema. osbaldo pedal pulses 2+.  Lymphadenopathy: No cervical or supraclavicular adenopathy.   Neurological: Alert and oriented to person, place, and time. exhibits normal muscle tone.  Skin: Skin is warm and dry. No diaphoresis.   Psychiatric: Normal mood and affect. Behavior is normal.      Assessment and Plan:     The following treatment plan was discussed:    1. Gastroesophageal reflux disease without esophagitis  omeprazole (PRILOSEC) 20 MG delayed-release capsule    sx mostly controlled. attempt omeprazole wean to every 2 days then every 3 days. continue pepcid for now. control constipation w/otc meds.      2. IFG (impaired fasting glucose)      a1c mildly increased. enc pt to continue to improve healthy diet, do regular exercise..  plan: [facility] lab 6 mo. call for lab slip. f/u for review.       3. Hyperlipidemia with target LDL less than 130  atorvastatin (LIPITOR) 10 MG Tab    stable on meds.  LP controlled      4. Hypertension, essential      BP has been elevated last several visits d/t pt feels r/t situational stress. normal at home. will monitor. f/u if elevated at home.      5. Disorder of vitamin B12      b12 low normal. take b12 1000 mcg daily      6. Vitamin D deficiency      stable and controlled on otc supplement      7. Memory loss of unknown cause      mild sx. take b12. if sx worsen will consider addt'l w/u w/lab and MRI brain      8. Varicella vaccination status unknown  VARICELLA ZOSTER IGG AB    doesn't know  if had chicken pox. will to titer.      9. Need for pneumococcal 20-valent conjugate vaccination  Pneumococcal Conjugate Vaccine 20-Valent (19 yrs+)    prevnar 20 given today            Followup: Return in about 6 months (around 11/4/2023), or call for lab slip.

## 2023-06-29 ENCOUNTER — OFFICE VISIT (OUTPATIENT)
Dept: URGENT CARE | Facility: CLINIC | Age: 78
End: 2023-06-29
Payer: MEDICARE

## 2023-06-29 VITALS
HEART RATE: 84 BPM | DIASTOLIC BLOOD PRESSURE: 80 MMHG | TEMPERATURE: 101.2 F | SYSTOLIC BLOOD PRESSURE: 122 MMHG | RESPIRATION RATE: 16 BRPM | OXYGEN SATURATION: 94 %

## 2023-06-29 DIAGNOSIS — R50.9 FEVER, UNSPECIFIED FEVER CAUSE: ICD-10-CM

## 2023-06-29 DIAGNOSIS — U07.1 COVID: ICD-10-CM

## 2023-06-29 DIAGNOSIS — R11.0 NAUSEA: ICD-10-CM

## 2023-06-29 DIAGNOSIS — Z20.822 SUSPECTED COVID-19 VIRUS INFECTION: ICD-10-CM

## 2023-06-29 LAB
FLUAV RNA SPEC QL NAA+PROBE: NEGATIVE
FLUBV RNA SPEC QL NAA+PROBE: NEGATIVE
RSV RNA SPEC QL NAA+PROBE: NEGATIVE
SARS-COV-2 RNA RESP QL NAA+PROBE: POSITIVE

## 2023-06-29 PROCEDURE — 3074F SYST BP LT 130 MM HG: CPT | Performed by: FAMILY MEDICINE

## 2023-06-29 PROCEDURE — 0241U POCT CEPHEID COV-2, FLU A/B, RSV - PCR: CPT | Performed by: FAMILY MEDICINE

## 2023-06-29 PROCEDURE — 99214 OFFICE O/P EST MOD 30 MIN: CPT | Performed by: FAMILY MEDICINE

## 2023-06-29 PROCEDURE — 3079F DIAST BP 80-89 MM HG: CPT | Performed by: FAMILY MEDICINE

## 2023-06-29 RX ORDER — ONDANSETRON 4 MG/1
4 TABLET, ORALLY DISINTEGRATING ORAL ONCE
Status: COMPLETED | OUTPATIENT
Start: 2023-06-29 | End: 2023-06-29

## 2023-06-29 RX ORDER — ONDANSETRON 4 MG/1
4 TABLET, FILM COATED ORAL EVERY 4 HOURS PRN
Qty: 10 TABLET | Refills: 0 | Status: SHIPPED | OUTPATIENT
Start: 2023-06-29 | End: 2023-07-02

## 2023-06-29 RX ADMIN — ONDANSETRON 4 MG: 4 TABLET, ORALLY DISINTEGRATING ORAL at 14:07

## 2023-06-29 ASSESSMENT — ENCOUNTER SYMPTOMS
FATIGUE: 1
COUGH: 0
SORE THROAT: 0
EYE REDNESS: 1
FEVER: 1
CHILLS: 0
NAUSEA: 1
SHORTNESS OF BREATH: 0
VOMITING: 0
MYALGIAS: 1
DIZZINESS: 1

## 2023-06-29 NOTE — PATIENT INSTRUCTIONS
INSTRUCTIONS FOR COVID-19 OR ANY OTHER INFECTIOUS RESPIRATORY ILLNESSES    Symptoms of viral acute respiratory illnesses (e.g COVID-19, Influenza, RSV etc.) may include: cough, shortness of breath or difficulty breathing, fever or chills, muscle or body aches, headache, sore throat, congestion or runny nose, nausea or vomiting, diarrhea, or new loss of taste or smell. Symptoms can range from mild to severe and may appear up to two weeks after exposure to infectious virus.    The practice of self-isolation and quarantine helps protect the public and your family by  preventing exposure to people who have or may have a contagious disease. Please follow the prevention steps below:    WHEN TO STOP ISOLATION: Persons with COVID-19 or any other infectious respiratory illness who have symptoms and were advised to care for themselves at home may discontinue home isolation under the following conditions:  At least 24 hours have passed since recovery defined as resolution of fever without the use of fever-reducing medications; AND,  Improvement in respiratory symptoms (e.g., cough, shortness of breath); AND,  At least 5 days have passed since symptoms first appeared. Immunocompromised* individual need to isolate for 10 days.  Wear a well-fitting mask for an additional 5 days anytime you are away from your home or around other people. If you are unable to wear one, maintain a minimum of 6 feet distancing from others.    *Definition of immunocompromised   1. Active treatment for solid tumor or hematologic malignancy  2. Receipt of solid organ transplant and taking immunosuppressant  3. Moderate or severe primary immunodeficiency  4. Advanced or untreated HIV infection  5. Active treatment with high-dose corticosteroids, chemotherapy, TNF blockers or other biological agent    MONITOR YOUR SYMPTOMS: If your illness is worsening, seek prompt medical attention.   ACTIVITY RESTRICTION: restrict activities outside your home, except  for getting medical care. Do not go to work, school, or public areas. Avoid using public transportation, ride-sharing, or taxis.    LIVING ENVIRONMENT: Stay in a separate room from other people and pets. If possible, use a separate bathroom and avoid sharing household items. Any items used should be washed thoroughly with soap and water. Clean all “high-touch” surfaces every day. Use a household cleaning spray or wipe, according to the label instructions. High touch surfaces include (but are not limited to) counters, tabletops, doorknobs, bathroom fixtures, toilets, phones, keyboards, tablets, and bedside tables.     HAND WASHING: Frequently wash hands with soap and water for at least 20 seconds, especially after blowing your nose, coughing, or sneezing; going to the bathroom; before and after interacting with pets; and before and after eating or preparing food. If hands are visibly dirty use soap and water. If soap and water are not available, use an alcohol-based hand  with at least 60% alcohol. Avoid touching your eyes, nose, and mouth with unwashed hands. Cover your coughs and sneezes with a tissue. Throw used tissues in a lined trash can. Immediately wash your hands.    ACTIVE/FACILITATED SELF-MONITORING: Follow instructions provided by your local health department or health professionals, as appropriate. When working with your local health department check their available hours.    [facility]   Phone Number   [place] [phone]   [facility] [phone]   [place] Call [address]   [place] [phone]

## 2023-06-29 NOTE — PROGRESS NOTES
Subjective:   Mandie Juan is a 77 y.o. female who presents for Nausea (X yesterday, nausea, fever, loss of appetite and feeling disoriented)        Nausea  Chronicity: Reports nausea, fatigue fever, onset yesterday, reports close contact with similar symptoms. The current episode started yesterday. The problem occurs intermittently. The problem has been unchanged. Associated symptoms include fatigue, a fever, myalgias and nausea. Pertinent negatives include no chest pain, chills, coughing, rash, sore throat, urinary symptoms or vomiting. Associated symptoms comments: Patient reports fatigue, anorexia, was able to tolerate oral solids this morning    There has been community-wide COVID-19 exposure, the patient denies known direct COVID-19 exposure    . She has tried rest and drinking for the symptoms. The treatment provided no relief.     PMH:  has a past medical history of Allergy, Basal cell carcinoma (2004), Cervical polyp, Chronic bilateral low back pain without sciatica (2/16/2022), Colon polyps, DDD (degenerative disc disease), lumbar (2/16/2022), Episode of recurrent major depressive disorder (HCC) (1/25/2021), Generalized anxiety disorder (1/25/2021), GERD without esophagitis, Hyperlipidemia, Hypertension, essential, Impaired fasting glucose, Internal hemorrhoids, Kidney stones, Primary osteoarthritis involving multiple joints (1/7/2021), and Vitamin d deficiency.    She has no past medical history of Breast cancer ([facility]) or Clotting disorder ([facility]).  MEDS:   Current Outpatient Medications:     Nirmatrelvir&Ritonavir 300/100 20 x 150 MG & 10 x 100MG Tablet Therapy Pack, Take 300 mg nirmatrelvir (two 150 mg tablets) with 100 mg ritonavir (one 100 mg tablet) by mouth, with all three tablets taken together twice daily for 5 days., Disp: 30 Each, Rfl: 0    ondansetron (ZOFRAN) 4 MG Tab tablet, Take 1 Tablet by mouth every four hours as needed for Nausea/Vomiting for up to 3 days., Disp: 10 Tablet, Rfl:  0    atorvastatin (LIPITOR) 10 MG Tab, Take 1 Tablet by mouth every day., Disp: 90 Tablet, Rfl: 3    losartan-hydrochlorothiazide (HYZAAR) 50-12.5 MG per tablet, TAKE ONE TABLET BY MOUTH EVERY DAY, Disp: 90 Tablet, Rfl: 3    VITAMIN D PO, Take 1 Tablet by mouth every day., Disp: , Rfl:     omeprazole (PRILOSEC) 20 MG delayed-release capsule, Take 1 Capsule by mouth every day. (Patient not taking: Reported on 6/29/2023), Disp: 90 Capsule, Rfl: 3    ibuprofen (MOTRIN) 200 MG Tab, Take 400 mg by mouth every 8 hours as needed. Indications: Pain (Patient not taking: Reported on 6/29/2023), Disp: , Rfl:     sucralfate (CARAFATE) 1 GM/10ML Suspension, Take 10 mL by mouth 4 times a day., Disp: 414 mL, Rfl: 3  ALLERGIES:   Allergies   Allergen Reactions    Compazine     Pcn [Penicillins]     Sulfa Drugs      SURGHX: History reviewed. No pertinent surgical history.  SOCHX:  reports that she has never smoked. She has never used smokeless tobacco. She reports that she does not currently use alcohol after a past usage of about 2.0 oz of alcohol per week. She reports that she does not use drugs.  FH:   Family History   Problem Relation Age of Onset    Heart Disease Mother     Cancer Father     Hypertension Sister     Hypertension Brother     Cancer Paternal Grandfather      Review of Systems   Constitutional:  Positive for fatigue, fever and malaise/fatigue. Negative for chills.   HENT:  Negative for sore throat.    Eyes:  Positive for redness.   Respiratory:  Negative for cough and shortness of breath.    Cardiovascular:  Negative for chest pain.   Gastrointestinal:  Positive for nausea. Negative for vomiting.   Genitourinary:  Negative for dysuria.   Musculoskeletal:  Positive for myalgias.   Skin:  Negative for rash.   Neurological:  Positive for dizziness.        Objective:   /80 (BP Location: Left arm, Patient Position: Sitting, BP Cuff Size: Adult)   Pulse 84   Temp (!) 38.4 °C (101.2 °F) (Temporal)   Resp 16    [facility] 03/[birth year]/1993   SpO2 94%   Physical Exam  Vitals and nursing note reviewed.   Constitutional:       General: She is not in acute distress.     Appearance: She is well-developed.   HENT:      Head: Normocephalic and atraumatic.      Right Ear: External ear normal.      Left Ear: External ear normal.      Nose: Nose normal.      Mouth/Throat:      Mouth: Mucous membranes are moist.   Eyes:      Conjunctiva/sclera: Conjunctivae normal.   Cardiovascular:      Rate and Rhythm: Normal rate.   Pulmonary:      Effort: Pulmonary effort is normal. No respiratory distress.      Breath sounds: Normal breath sounds.   Abdominal:      General: There is no distension.   Musculoskeletal:         General: Normal range of motion.   Skin:     General: Skin is warm and dry.   Neurological:      General: No focal deficit present.      Mental Status: She is alert and oriented to person, place, and time. Mental status is at baseline.      Gait: Gait (gait at baseline) normal.   Psychiatric:         Judgment: Judgment normal.           Assessment/Plan:   1. Suspected COVID-19 virus infection  - POCT CoV-2, Flu A/B, RSV by PCR    2. Nausea  - ondansetron (ZOFRAN ODT) dispertab 4 mg  - ondansetron (ZOFRAN) 4 MG Tab tablet; Take 1 Tablet by mouth every four hours as needed for Nausea/Vomiting for up to 3 days.  Dispense: 10 Tablet; Refill: 0    3. Fever, unspecified fever cause  - POCT CoV-2, Flu A/B, RSV by PCR  - Nirmatrelvir&Ritonavir 300/100 20 x 150 MG & 10 x 100MG Tablet Therapy Pack; Take 300 mg nirmatrelvir (two 150 mg tablets) with 100 mg ritonavir (one 100 mg tablet) by mouth, with all three tablets taken together twice daily for 5 days.  Dispense: 30 Each; Refill: 0    4. COVID  - Nirmatrelvir&Ritonavir 300/100 20 x 150 MG & 10 x 100MG Tablet Therapy Pack; Take 300 mg nirmatrelvir (two 150 mg tablets) with 100 mg ritonavir (one 100 mg tablet) by mouth, with all three tablets taken together twice daily for 5 days.  Dispense: 30  Each; Refill: 0    Advised routine [facility] social distancing guidelines, symptomatic and supportive measures    Medical decision-making/course: The patient remained  hemodynamically and neurologically stable with no evidence of respiratory compromise throughout the urgent care course.  The fever was noted and consistent with an consistent with acute COVID there was no immediate clinical indication for the necessity of emergency department evaluation or inpatient admission and the patient was amendable to a trial of outpatient management.        Medical Decision Making/Course:  In the course of preparing for this visit with review of the pertinent past medical history, recent and past clinic visits, current medications, and performing chart, immunization, medical history and medication reconciliation, and in the further course of obtaining the current history pertinent to the clinic visit today, performing an exam and evaluation, ordering and independently evaluating labs, tests including Influenza A, Influenza B, RSV, and SARS CoV-2 by PCR testing   , and/or procedures, prescribing any recommended new medications as noted above including administration of ondansetron 4 mg orally once during urgent care course including observation thereafter in which patient was tolerating oral fluids with no persistent vomiting and including discussion of emergency use authorization of antiviral Paxlovid for COVID-19 and the patient requesting a prescription for Paxlovid and accordingly the patient was advised to hold taking atorvastatin for 1 week while taking Paxlovid and then recommended to restart thereafter, providing any pertinent counseling and education and recommending further coordination of care including recommendations for symptomatic and supportive measures and recommendations return for any persistent or worsening symptoms, at least  48 minutes of total time were spent during this encounter.      Discussed close  monitoring, return precautions, and supportive measures of maintaining adequate fluid hydration and caloric intake, relative rest and symptom management as needed for pain and/or fever.    Differential diagnosis, natural history, supportive care, and indications for immediate follow-up discussed.     Advised the patient to follow-up with the primary care physician for recheck, reevaluation, and consideration of further management.    Please note that this dictation was created using voice recognition software. I have worked with consultants from the vendor as well as technical experts from [facility] [employer] to optimize the interface. I have made every reasonable attempt to correct obvious errors, but I expect that there are errors of grammar and possibly content that I did not discover before finalizing the note.

## 2023-07-17 ENCOUNTER — OFFICE VISIT (OUTPATIENT)
Dept: MEDICAL GROUP | Facility: MEDICAL CENTER | Age: 78
End: 2023-07-17
Payer: MEDICARE

## 2023-07-17 VITALS
WEIGHT: 149 LBS | DIASTOLIC BLOOD PRESSURE: 80 MMHG | TEMPERATURE: 98.4 F | HEIGHT: 65 IN | OXYGEN SATURATION: 96 % | HEART RATE: 63 BPM | RESPIRATION RATE: 17 BRPM | SYSTOLIC BLOOD PRESSURE: 120 MMHG | BODY MASS INDEX: 24.83 KG/M2

## 2023-07-17 DIAGNOSIS — U07.1 COVID: ICD-10-CM

## 2023-07-17 PROCEDURE — 99213 OFFICE O/P EST LOW 20 MIN: CPT | Performed by: NURSE PRACTITIONER

## 2023-07-17 PROCEDURE — 3079F DIAST BP 80-89 MM HG: CPT | Performed by: NURSE PRACTITIONER

## 2023-07-17 PROCEDURE — 3074F SYST BP LT 130 MM HG: CPT | Performed by: NURSE PRACTITIONER

## 2023-07-17 RX ORDER — UBIDECARENONE 75 MG
100 CAPSULE ORAL DAILY
COMMUNITY

## 2023-07-17 ASSESSMENT — FIBROSIS 4 INDEX: FIB4 SCORE: 1.46

## 2023-07-17 NOTE — PROGRESS NOTES
Subjective:     Mandie Juan is a 77 y.o. female who presents with s/p COVID.    HPI:   Seen in f/u s/p COVID.  She was dx with COVID.  She was seen in  for dx.  She was given Paxlovid.  She did have one episode of LOC.  She call 911.  The paramedics checked her out and she was ok.  She was sx all the way thru paxlovid.  She was unable to eat during the illness.  She is now able to eat.  She held her statin during the med and for almost 1 wk after.  No fever, chills or sweating.  During illness she had left ear pain and left submandibular lymphadenopathy.  That has allr resolved.  No SOB or wheezing.  No cough.  She is still fatigued.  Taking naps daily.  Eating and drinking approp now. Lost 12 lbs during the illness.      Patient Active Problem List    Diagnosis Date Noted    Chronic bilateral low back pain without sciatica 02/16/2022    DDD (degenerative disc disease), lumbar 02/16/2022    Recurrent major depressive disorder (HCC) 01/25/2021    Generalized anxiety disorder 01/25/2021    Primary osteoarthritis involving multiple joints 01/07/2021    Hypertension, essential     GERD without esophagitis     Impaired fasting glucose     Internal hemorrhoids     Allergic rhinitis due to allergen 02/21/2014    Basal cell carcinoma     Vitamin D deficiency 11/03/2011    Hyperlipidemia with target LDL less than 130 11/03/2011    Cervical polyp     Kidney stones     Colon polyps        Current medicines (including changes today)  Current Outpatient Medications   Medication Sig Dispense Refill    cyanocobalamin (VITAMIN B-12) 100 MCG Tab Take 100 mcg by mouth every day.      omeprazole (PRILOSEC) 20 MG delayed-release capsule Take 1 Capsule by mouth every day. 90 Capsule 3    atorvastatin (LIPITOR) 10 MG Tab Take 1 Tablet by mouth every day. 90 Tablet 3    losartan-hydrochlorothiazide (HYZAAR) 50-12.5 MG per tablet TAKE ONE TABLET BY MOUTH EVERY DAY 90 Tablet 3    VITAMIN D PO Take 1 Tablet by mouth every  "day.      ibuprofen (MOTRIN) 200 MG Tab Take 400 mg by mouth every 8 hours as needed. Indications: Pain      Nirmatrelvir&Ritonavir 300/100 20 x 150 MG & 10 x 100MG Tablet Therapy Pack Take 300 mg nirmatrelvir (two 150 mg tablets) with 100 mg ritonavir (one 100 mg tablet) by mouth, with all three tablets taken together twice daily for 5 days. 30 Each 0     No current facility-administered medications for this visit.       Allergies   Allergen Reactions    Compazine     Pcn [Penicillins]     Sulfa Drugs        ROS  Constitutional: Negative. Negative for weight loss, diaphoresis.   HENT: Negative. Negative for hearing loss, ear pain, nosebleeds, sore throat, neck pain, tinnitus and ear discharge.   Respiratory: Negative. Negative for hemoptysis,  stridor.   Cardiovascular: Negative. Negative for chest pain, palpitations, orthopnea, claudication, leg swelling and PND.   Gastrointestinal: Denies nausea, vomiting, diarrhea, constipation, heartburn, melena or hematochezia.  Genitourinary: Denies dysuria, hematuria, urinary incontinence, frequency or urgency.        Objective:     /80 (BP Location: Right arm, Patient Position: Sitting, BP Cuff Size: Adult)   Pulse 63   Temp 36.9 °C (98.4 °F) (Temporal)   Resp 17   Ht 1.651 m (5' 5\")   Wt 67.6 kg (149 lb)   SpO2 96%  Body mass index is 24.79 kg/m².    Physical Exam:  Physical Exam   Vitals reviewed.  Constitutional: oriented to person, place, and time. appears well-developed and well-nourished. No distress.   HENT:  Head: Normocephalic and atraumatic. Right Ear: External ear normal. Left Ear: External ear normal. Nose: Nose normal. Mouth/Throat: Oropharynx is clear and moist. No oropharyngeal exudate.  osbaldo tm wnl.  Eyes: Right eye exhibits no discharge. Left eye exhibits no discharge. No scleral icterus.  Neck: No JVD present.  Cardiovascular: Normal rate, regular rhythm, normal heart sounds and intact distal pulses.  Exam reveals no gallop and no friction rub.  " No murmur heard.  No carotid bruits.   Pulmonary/Chest: Effort normal and breath sounds normal. No stridor. No respiratory distress. no wheezes or rales. exhibits no tenderness.   Musculoskeletal: Normal range of motion. exhibits no edema. osbaldo pedal pulses 2+.  Lymphadenopathy: no cervical or supraclavicular adenopathy.   Neurological: alert and oriented to person, place, and time. exhibits normal muscle tone. Coordination normal.   Skin: Skin is warm and dry. no diaphoresis.   Psychiatric: normal mood and affect. behavior is normal.        Assessment and Plan:     The following treatment plan was discussed:    1. COVID      finished paxlovid.  now recovering. no sx of pneumonia or infection.  still fatigued            Followup: Return if symptoms worsen or fail to improve.

## 2023-07-18 ENCOUNTER — TELEPHONE (OUTPATIENT)
Dept: HEALTH INFORMATION MANAGEMENT | Facility: OTHER | Age: 78
End: 2023-07-18
Payer: MEDICARE

## 2023-08-16 ENCOUNTER — HOSPITAL ENCOUNTER (OUTPATIENT)
Dept: RADIOLOGY | Facility: MEDICAL CENTER | Age: 78
End: 2023-08-16
Attending: NURSE PRACTITIONER
Payer: MEDICARE

## 2023-08-16 ENCOUNTER — OFFICE VISIT (OUTPATIENT)
Dept: MEDICAL GROUP | Facility: MEDICAL CENTER | Age: 78
End: 2023-08-16
Payer: MEDICARE

## 2023-08-16 VITALS
DIASTOLIC BLOOD PRESSURE: 60 MMHG | SYSTOLIC BLOOD PRESSURE: 120 MMHG | WEIGHT: 149 LBS | BODY MASS INDEX: 24.83 KG/M2 | OXYGEN SATURATION: 94 % | HEART RATE: 74 BPM | HEIGHT: 65 IN | TEMPERATURE: 98.2 F | RESPIRATION RATE: 17 BRPM

## 2023-08-16 DIAGNOSIS — S09.93XA FACIAL TRAUMA, INITIAL ENCOUNTER: ICD-10-CM
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
## 2023-08-16 PROCEDURE — 3074F SYST BP LT 130 MM HG: CPT | Performed by: NURSE PRACTITIONER

## 2023-08-16 PROCEDURE — 99213 OFFICE O/P EST LOW 20 MIN: CPT | Performed by: NURSE PRACTITIONER

## 2023-08-16 PROCEDURE — 3078F DIAST BP <80 MM HG: CPT | Performed by: NURSE PRACTITIONER

## 2023-08-16 ASSESSMENT — FIBROSIS 4 INDEX: FIB4 SCORE: 1.46

## 2023-08-16 NOTE — PROGRESS NOTES
Subjective:     Mandie Juan is a 77 y.o. female who presents with fall.    HPI:   Seen in f/u for fall.  She was working in her yard with changing plants.  She fell in the dirt on her face.  Soil was soft.  She has a small laceration on upper bridge of her nose.  No LOC or headache.  She just slipped and fell. Nose is still sore.  Most of swelling is resolved.  Intially had bruising under her eyes.  That is resolved.      Patient Active Problem List    Diagnosis Date Noted    Chronic bilateral low back pain without sciatica 02/16/2022    DDD (degenerative disc disease), lumbar 02/16/2022    Recurrent major depressive disorder (HCC) 01/25/2021    Generalized anxiety disorder 01/25/2021    Primary osteoarthritis involving multiple joints 01/07/2021    Hypertension, essential     GERD without esophagitis     Impaired fasting glucose     Internal hemorrhoids     Allergic rhinitis due to allergen 02/21/2014    Basal cell carcinoma     Vitamin D deficiency 11/03/2011    Hyperlipidemia with target LDL less than 130 11/03/2011    Cervical polyp     Kidney stones     Colon polyps        Current medicines (including changes today)  Current Outpatient Medications   Medication Sig Dispense Refill    cyanocobalamin (VITAMIN B-12) 100 MCG Tab Take 100 mcg by mouth every day.      Nirmatrelvir&Ritonavir 300/100 20 x 150 MG & 10 x 100MG Tablet Therapy Pack Take 300 mg nirmatrelvir (two 150 mg tablets) with 100 mg ritonavir (one 100 mg tablet) by mouth, with all three tablets taken together twice daily for 5 days. 30 Each 0    omeprazole (PRILOSEC) 20 MG delayed-release capsule Take 1 Capsule by mouth every day. 90 Capsule 3    atorvastatin (LIPITOR) 10 MG Tab Take 1 Tablet by mouth every day. 90 Tablet 3    losartan-hydrochlorothiazide (HYZAAR) 50-12.5 MG per tablet TAKE ONE TABLET BY MOUTH EVERY DAY 90 Tablet 3    VITAMIN D PO Take 1 Tablet by mouth every day.      ibuprofen (MOTRIN) 200 MG Tab Take 400 mg by mouth  "every 8 hours as needed. Indications: Pain       No current facility-administered medications for this visit.       Allergies   Allergen Reactions    Compazine     Pcn [Penicillins]     Sulfa Drugs        ROS  Constitutional: Negative. Negative for fever, chills, weight loss, malaise/fatigue and diaphoresis.   HENT: Negative. Negative for hearing loss, ear pain, nosebleeds, congestion, sore throat, neck pain, tinnitus and ear discharge.   Respiratory: Negative. Negative for cough, hemoptysis, sputum production, shortness of breath, wheezing and stridor.   Cardiovascular: Negative. Negative for chest pain, palpitations, orthopnea, claudication, leg swelling and PND.   Gastrointestinal: Denies nausea, vomiting, diarrhea, constipation, heartburn, melena or hematochezia.  Genitourinary: Denies dysuria, hematuria, urinary incontinence, frequency or urgency.        Objective:     /60 (BP Location: Right arm, Patient Position: Sitting, BP Cuff Size: Adult)   Pulse 74   Temp 36.8 °C (98.2 °F) (Temporal)   Resp 17   Ht 1.651 m (5' 5\")   Wt 67.6 kg (149 lb)   SpO2 94%  Body mass index is 24.79 kg/m².    Physical Exam:  Vitals reviewed.  Constitutional: Oriented to person, place, and time. appears well-developed and well-nourished. No distress.   Cardiovascular: Normal rate, regular rhythm, normal heart sounds and intact distal pulses. Exam reveals no gallop and no friction rub. No murmur heard. No carotid bruits.   Pulmonary/Chest: Effort normal and breath sounds normal. No stridor. No respiratory distress. no wheezes or rales. exhibits no tenderness.   Musculoskeletal: Normal range of motion. exhibits no edema. osbaldo pedal pulses 2+.  Lymphadenopathy: No cervical or supraclavicular adenopathy.   Neurological: Alert and oriented to person, place, and time. exhibits normal muscle tone.  PERRLA, CN 2-11 intact.  Skin: Skin is warm and dry. No diaphoresis.   Psychiatric: Normal mood and affect. Behavior is normal.    "   Assessment and Plan:     The following treatment plan was discussed:    1. Fall, initial encounter  DX-NASAL BONES 3+    DX-ORBITS-COMPLETE 4+ LEFT    DX-ORBITS-COMPLETE 4+ RIGHT    no LOC or headache.  neuro intact.  do xrays of nasal bones and osbaldo orbits.  f/u w/pt w/results.       2. Facial trauma, initial encounter  DX-NASAL BONES 3+    DX-ORBITS-COMPLETE 4+ LEFT    DX-ORBITS-COMPLETE 4+ RIGHT    do xrays and f/u w/pt w/results.  no LOC or headache so no CT scan head needed.            Followup: Return if symptoms worsen or fail to improve.

## 2023-08-17 ENCOUNTER — HOSPITAL ENCOUNTER (OUTPATIENT)
Dept: RADIOLOGY | Facility: MEDICAL CENTER | Age: 78
End: 2023-08-17
Attending: NURSE PRACTITIONER
Payer: MEDICARE

## 2023-08-17 DIAGNOSIS — S09.93XA FACIAL TRAUMA, INITIAL ENCOUNTER: ICD-10-CM
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
## 2023-08-17 PROCEDURE — 70160 X-RAY EXAM OF NASAL BONES: CPT

## 2023-09-26 ENCOUNTER — APPOINTMENT (RX ONLY)
Dept: URBAN - METROPOLITAN AREA CLINIC 35 | Facility: CLINIC | Age: 78
Setting detail: DERMATOLOGY
End: 2023-09-26

## 2023-09-26 DIAGNOSIS — D18.0 HEMANGIOMA: ICD-10-CM

## 2023-09-26 DIAGNOSIS — Z85.828 PERSONAL HISTORY OF OTHER MALIGNANT NEOPLASM OF SKIN: ICD-10-CM

## 2023-09-26 DIAGNOSIS — L81.4 OTHER MELANIN HYPERPIGMENTATION: ICD-10-CM

## 2023-09-26 DIAGNOSIS — L85.3 XEROSIS CUTIS: ICD-10-CM

## 2023-09-26 DIAGNOSIS — L82.1 OTHER SEBORRHEIC KERATOSIS: ICD-10-CM

## 2023-09-26 DIAGNOSIS — Z71.89 OTHER SPECIFIED COUNSELING: ICD-10-CM

## 2023-09-26 DIAGNOSIS — M71 OTHER BURSOPATHIES: ICD-10-CM

## 2023-09-26 DIAGNOSIS — D22 MELANOCYTIC NEVI: ICD-10-CM

## 2023-09-26 PROBLEM — D22.61 MELANOCYTIC NEVI OF RIGHT UPPER LIMB, INCLUDING SHOULDER: Status: ACTIVE | Noted: 2023-09-26

## 2023-09-26 PROBLEM — D18.01 HEMANGIOMA OF SKIN AND SUBCUTANEOUS TISSUE: Status: ACTIVE | Noted: 2023-09-26

## 2023-09-26 PROBLEM — D22.71 MELANOCYTIC NEVI OF RIGHT LOWER LIMB, INCLUDING HIP: Status: ACTIVE | Noted: 2023-09-26

## 2023-09-26 PROBLEM — D22.72 MELANOCYTIC NEVI OF LEFT LOWER LIMB, INCLUDING HIP: Status: ACTIVE | Noted: 2023-09-26

## 2023-09-26 PROBLEM — M71.341 OTHER BURSAL CYST, RIGHT HAND: Status: ACTIVE | Noted: 2023-09-26

## 2023-09-26 PROCEDURE — ? PRESCRIPTION

## 2023-09-26 PROCEDURE — ? TREATMENT REGIMEN

## 2023-09-26 PROCEDURE — 99213 OFFICE O/P EST LOW 20 MIN: CPT

## 2023-09-26 PROCEDURE — ? OBSERVATION AND MEASURE

## 2023-09-26 PROCEDURE — ? SUNSCREEN RECOMMENDATIONS

## 2023-09-26 PROCEDURE — ? COUNSELING

## 2023-09-26 RX ORDER — ALCLOMETASONE DIPROPIONATE 0.5 MG/G
1 CREAM TOPICAL DAILY
Qty: 45 | Refills: 3 | Status: ERX

## 2023-09-26 ASSESSMENT — LOCATION DETAILED DESCRIPTION DERM
LOCATION DETAILED: RIGHT PROXIMAL DORSAL FOREARM
LOCATION DETAILED: LEFT SUPERIOR UPPER BACK
LOCATION DETAILED: LEFT DORSAL 3RD TOE
LOCATION DETAILED: RIGHT NASAL ALA
LOCATION DETAILED: RIGHT LATERAL PLANTAR 1ST TOE
LOCATION DETAILED: RIGHT DISTAL PRETIBIAL REGION
LOCATION DETAILED: PERIUNGUAL SKIN RIGHT INDEX FINGER
LOCATION DETAILED: LEFT MID-UPPER BACK
LOCATION DETAILED: RIGHT POSTERIOR SHOULDER
LOCATION DETAILED: LEFT PROXIMAL DORSAL FOREARM

## 2023-09-26 ASSESSMENT — LOCATION ZONE DERM
LOCATION ZONE: LEG
LOCATION ZONE: NOSE
LOCATION ZONE: ARM
LOCATION ZONE: FINGER
LOCATION ZONE: TOE
LOCATION ZONE: TRUNK

## 2023-09-26 ASSESSMENT — LOCATION SIMPLE DESCRIPTION DERM
LOCATION SIMPLE: LEFT UPPER BACK
LOCATION SIMPLE: PLANTAR SURFACE OF RIGHT 1ST TOE
LOCATION SIMPLE: RIGHT FOREARM
LOCATION SIMPLE: RIGHT PRETIBIAL REGION
LOCATION SIMPLE: RIGHT SHOULDER
LOCATION SIMPLE: RIGHT INDEX FINGER
LOCATION SIMPLE: LEFT FOREARM
LOCATION SIMPLE: RIGHT NOSE
LOCATION SIMPLE: LEFT 3RD TOE

## 2023-09-28 DIAGNOSIS — R73.01 IFG (IMPAIRED FASTING GLUCOSE): ICD-10-CM

## 2023-10-11 ENCOUNTER — OFFICE VISIT (OUTPATIENT)
Dept: MEDICAL GROUP | Facility: MEDICAL CENTER | Age: 78
End: 2023-10-11
Payer: MEDICARE

## 2023-10-11 VITALS
HEIGHT: 65 IN | HEART RATE: 67 BPM | TEMPERATURE: 98.1 F | SYSTOLIC BLOOD PRESSURE: 120 MMHG | DIASTOLIC BLOOD PRESSURE: 60 MMHG | RESPIRATION RATE: 17 BRPM | BODY MASS INDEX: 24.99 KG/M2 | OXYGEN SATURATION: 95 % | WEIGHT: 150 LBS

## 2023-10-11 DIAGNOSIS — R10.31 RIGHT GROIN PAIN: ICD-10-CM

## 2023-10-11 DIAGNOSIS — M54.50 ACUTE BILATERAL LOW BACK PAIN WITHOUT SCIATICA: ICD-10-CM

## 2023-10-11 PROCEDURE — 3074F SYST BP LT 130 MM HG: CPT | Performed by: NURSE PRACTITIONER

## 2023-10-11 PROCEDURE — 99214 OFFICE O/P EST MOD 30 MIN: CPT | Performed by: NURSE PRACTITIONER

## 2023-10-11 PROCEDURE — 3078F DIAST BP <80 MM HG: CPT | Performed by: NURSE PRACTITIONER

## 2023-10-11 RX ORDER — METHYLPREDNISOLONE 4 MG/1
TABLET ORAL
Qty: 21 TABLET | Refills: 0 | Status: SHIPPED
Start: 2023-10-11 | End: 2024-01-02

## 2023-10-11 RX ORDER — GABAPENTIN 100 MG/1
100-300 CAPSULE ORAL
Qty: 45 CAPSULE | Refills: 1 | Status: SHIPPED | OUTPATIENT
Start: 2023-10-11 | End: 2023-10-23 | Stop reason: SDUPTHER

## 2023-10-11 ASSESSMENT — FIBROSIS 4 INDEX: FIB4 SCORE: 1.47

## 2023-10-11 NOTE — PROGRESS NOTES
Subjective:     Mandie Juan is a 78 y.o. female who presents with low back pain.    HPI:   Seen in fu for reoccurring LBP.  She was having LBP but it got better.  Now it is back again.  She is having low back pain that radiates to her rt hip and groin.  Pain is waking her up at nite.  She walks her dog and he is pulling on her with the leash.  May be contributing to her pain. She has done PT for the pain last yr.  She then continued to do exercises at home until she got sick with COVID in july.   She had MRI lumbar spine in 2021 with multiple levels of ddd, bulging and central canal stenosis.     Patient Active Problem List    Diagnosis Date Noted    Chronic bilateral low back pain without sciatica 02/16/2022    DDD (degenerative disc disease), lumbar 02/16/2022    Recurrent major depressive disorder (HCC) 01/25/2021    Generalized anxiety disorder 01/25/2021    Primary osteoarthritis involving multiple joints 01/07/2021    Hypertension, essential     GERD without esophagitis     Impaired fasting glucose     Internal hemorrhoids     Allergic rhinitis due to allergen 02/21/2014    Basal cell carcinoma     Vitamin D deficiency 11/03/2011    Hyperlipidemia with target LDL less than 130 11/03/2011    Cervical polyp     Kidney stones     Colon polyps        Current medicines (including changes today)  Current Outpatient Medications   Medication Sig Dispense Refill    methylPREDNISolone (MEDROL DOSEPAK) 4 MG Tablet Therapy Pack As directed on the packaging label. 21 Tablet 0    gabapentin (NEURONTIN) 100 MG Cap Take 1-3 Capsules by mouth at bedtime. 45 Capsule 1    losartan-hydrochlorothiazide (HYZAAR) 50-12.5 MG per tablet TAKE ONE TABLET BY MOUTH EVERY DAY 90 Tablet 0    cyanocobalamin (VITAMIN B-12) 100 MCG Tab Take 100 mcg by mouth every day.      omeprazole (PRILOSEC) 20 MG delayed-release capsule Take 1 Capsule by mouth every day. 90 Capsule 3    atorvastatin (LIPITOR) 10 MG Tab Take 1 Tablet by mouth  "every day. 90 Tablet 3    VITAMIN D PO Take 1 Tablet by mouth every day.      ibuprofen (MOTRIN) 200 MG Tab Take 400 mg by mouth every 8 hours as needed. Indications: Pain       No current facility-administered medications for this visit.       Allergies   Allergen Reactions    Compazine     Pcn [Penicillins]     Sulfa Drugs        ROS  Constitutional: Negative. Negative for fever, chills, weight loss, malaise/fatigue and diaphoresis.   HENT: Negative. Negative for hearing loss, ear pain, nosebleeds, congestion, sore throat, neck pain, tinnitus and ear discharge.   Respiratory: Negative. Negative for cough, hemoptysis, sputum production, shortness of breath, wheezing and stridor.   Cardiovascular: Negative. Negative for chest pain, palpitations, orthopnea, claudication, leg swelling and PND.   Gastrointestinal: Denies nausea, vomiting, diarrhea, constipation, heartburn, melena or hematochezia.  Genitourinary: Denies dysuria, hematuria, urinary incontinence, frequency or urgency.        Objective:     /60 (BP Location: Right arm, Patient Position: Sitting, BP Cuff Size: Adult)   Pulse 67   Temp 36.7 °C (98.1 °F) (Temporal)   Resp 17   Ht 1.651 m (5' 5\")   Wt 68 kg (150 lb)   SpO2 95%  Body mass index is 24.96 kg/m².    Physical Exam:  Vitals reviewed.  Constitutional: Oriented to person, place, and time. appears well-developed and well-nourished. No distress.   Cardiovascular: Normal rate, regular rhythm, normal heart sounds and intact distal pulses. Exam reveals no gallop and no friction rub. No murmur heard. No carotid bruits.   Pulmonary/Chest: Effort normal and breath sounds normal. No stridor. No respiratory distress. no wheezes or rales. exhibits no tenderness.   Musculoskeletal: mildly decreased rt hip and leg range of motion. exhibits no edema. osbaldo pedal pulses 2+.  Lymphadenopathy: No cervical or supraclavicular adenopathy.   Neurological: Alert and oriented to person, place, and time. exhibits " normal muscle tone.  Skin: Skin is warm and dry. No diaphoresis.   Psychiatric: Normal mood and affect. Behavior is normal.      Assessment and Plan:     The following treatment plan was discussed:    1. Right groin pain  methylPREDNISolone (MEDROL DOSEPAK) 4 MG Tablet Therapy Pack    gabapentin (NEURONTIN) 100 MG Cap    Referral to Physical Therapy    pain is most likely radiation from LBP & DDD. take medrol dosepak. start neurontin 100 mg hs. may wean up to 300 mg hs. redo PT.       2. Acute bilateral low back pain without sciatica  methylPREDNISolone (MEDROL DOSEPAK) 4 MG Tablet Therapy Pack    gabapentin (NEURONTIN) 100 MG Cap    Referral to Physical Therapy    f/u if sx persist. will consider referral to physiatry            Followup: Return in about 4 weeks (around 11/8/2023), or forlab review and sx eval.

## 2023-10-23 DIAGNOSIS — R10.31 RIGHT GROIN PAIN: ICD-10-CM

## 2023-10-23 DIAGNOSIS — M54.50 ACUTE BILATERAL LOW BACK PAIN WITHOUT SCIATICA: ICD-10-CM

## 2023-10-23 RX ORDER — GABAPENTIN 100 MG/1
100-300 CAPSULE ORAL
Qty: 45 CAPSULE | Refills: 3 | Status: SHIPPED | OUTPATIENT
Start: 2023-10-23 | End: 2024-01-02 | Stop reason: SDUPTHER

## 2023-12-27 DIAGNOSIS — I10 HYPERTENSION, ESSENTIAL: ICD-10-CM

## 2023-12-28 NOTE — TELEPHONE ENCOUNTER
Received request via: Pharmacy    Was the patient seen in the last year in this department? Yes    Does the patient have an active prescription (recently filled or refills available) for medication(s) requested? yes    Does the patient have [facility] Plus and need 100 day supply (blood pressure, diabetes and cholesterol meds only)? Yes, quantity updated to 100 days  Requested Prescriptions     Pending Prescriptions Disp Refills    losartan-hydrochlorothiazide (HYZAAR) 50-12.5 MG per tablet [Pharmacy Med Name: LOSARTAN POTASSIUM-HCT 50-12.5 TABS] 90 Tablet 0     Sig: TAKE ONE TABLET BY MOUTH EVERY DAY

## 2023-12-29 RX ORDER — LOSARTAN POTASSIUM AND HYDROCHLOROTHIAZIDE 12.5; 5 MG/1; MG/1
TABLET ORAL
Qty: 90 TABLET | Refills: 0 | Status: SHIPPED | OUTPATIENT
Start: 2023-12-29 | End: 2024-01-02 | Stop reason: SDUPTHER

## 2024-01-02 ENCOUNTER — OFFICE VISIT (OUTPATIENT)
Dept: MEDICAL GROUP | Facility: MEDICAL CENTER | Age: 79
End: 2024-01-02
Payer: MEDICARE

## 2024-01-02 VITALS
HEART RATE: 70 BPM | HEIGHT: 64 IN | SYSTOLIC BLOOD PRESSURE: 100 MMHG | WEIGHT: 149 LBS | BODY MASS INDEX: 25.44 KG/M2 | DIASTOLIC BLOOD PRESSURE: 60 MMHG | OXYGEN SATURATION: 97 % | TEMPERATURE: 97.8 F

## 2024-01-02 DIAGNOSIS — I10 HYPERTENSION, ESSENTIAL: ICD-10-CM

## 2024-01-02 DIAGNOSIS — R10.31 RIGHT GROIN PAIN: ICD-10-CM

## 2024-01-02 DIAGNOSIS — G89.29 CHRONIC BILATERAL LOW BACK PAIN WITH RIGHT-SIDED SCIATICA: ICD-10-CM

## 2024-01-02 DIAGNOSIS — M54.41 CHRONIC BILATERAL LOW BACK PAIN WITH RIGHT-SIDED SCIATICA: ICD-10-CM

## 2024-01-02 DIAGNOSIS — M51.36 DDD (DEGENERATIVE DISC DISEASE), LUMBAR: ICD-10-CM

## 2024-01-02 PROCEDURE — 99214 OFFICE O/P EST MOD 30 MIN: CPT | Performed by: NURSE PRACTITIONER

## 2024-01-02 PROCEDURE — 3078F DIAST BP <80 MM HG: CPT | Performed by: NURSE PRACTITIONER

## 2024-01-02 PROCEDURE — 3074F SYST BP LT 130 MM HG: CPT | Performed by: NURSE PRACTITIONER

## 2024-01-02 RX ORDER — LOSARTAN POTASSIUM AND HYDROCHLOROTHIAZIDE 12.5; 5 MG/1; MG/1
1 TABLET ORAL
Qty: 100 TABLET | Refills: 3 | Status: SHIPPED | OUTPATIENT
Start: 2024-01-02 | End: 2024-01-02 | Stop reason: SDUPTHER

## 2024-01-02 RX ORDER — GABAPENTIN 100 MG/1
200 CAPSULE ORAL
Qty: 200 CAPSULE | Refills: 3 | Status: SHIPPED | OUTPATIENT
Start: 2024-01-02

## 2024-01-02 RX ORDER — LOSARTAN POTASSIUM AND HYDROCHLOROTHIAZIDE 12.5; 5 MG/1; MG/1
1 TABLET ORAL
Qty: 100 TABLET | Refills: 3 | Status: SHIPPED | OUTPATIENT
Start: 2024-01-02

## 2024-01-02 RX ORDER — DICLOFENAC SODIUM 75 MG/1
75 TABLET, DELAYED RELEASE ORAL 2 TIMES DAILY
Qty: 30 TABLET | Refills: 0 | Status: SHIPPED | OUTPATIENT
Start: 2024-01-02 | End: 2024-01-02 | Stop reason: SDUPTHER

## 2024-01-02 RX ORDER — DICLOFENAC SODIUM 75 MG/1
75 TABLET, DELAYED RELEASE ORAL 2 TIMES DAILY
Qty: 30 TABLET | Refills: 0 | Status: SHIPPED | OUTPATIENT
Start: 2024-01-02 | End: 2024-01-17 | Stop reason: SDUPTHER

## 2024-01-02 ASSESSMENT — FIBROSIS 4 INDEX: FIB4 SCORE: 1.47

## 2024-01-02 NOTE — PROGRESS NOTES
Subjective:     Mandie Juan is a 78 y.o. female who presents with low back pain.    HPI:   Seen in low back pain.  She is having more LBP.  She has been in PT.  She is doing her daily exercises at home as well as seeing PT 2x/wk. She was having rt shoulder pain that is mildly and improved.  She still has the LBP.  There is also a muscle injury in her inner rt leg.  She is having rt groin pain.  Her PT told her it was a muscle that was causing the pain.  She is also having rt buttock.  No saddle aneathsia or incontinence.  Her pain is not well controlled on current regimen.  No leg numbness.    BP is stable and well controlled.  Taking her losartan hct approp.  No s/e to med.  Needs med refilled.        Patient Active Problem List    Diagnosis Date Noted    Chronic bilateral low back pain without sciatica 02/16/2022    DDD (degenerative disc disease), lumbar 02/16/2022    Recurrent major depressive disorder (HCC) 01/25/2021    Generalized anxiety disorder 01/25/2021    Primary osteoarthritis involving multiple joints 01/07/2021    Hypertension, essential     GERD without esophagitis     Impaired fasting glucose     Internal hemorrhoids     Allergic rhinitis due to allergen 02/21/2014    Basal cell carcinoma     Vitamin D deficiency 11/03/2011    Hyperlipidemia with target LDL less than 130 11/03/2011    Cervical polyp     Kidney stones     Colon polyps        Current medicines (including changes today)  Current Outpatient Medications   Medication Sig Dispense Refill    gabapentin (NEURONTIN) 100 MG Cap Take 2 Capsules by mouth at bedtime. 200 Capsule 3    losartan-hydrochlorothiazide (HYZAAR) 50-12.5 MG per tablet Take 1 Tablet by mouth every day. 100 Tablet 3    diclofenac DR (VOLTAREN) 75 MG Tablet Delayed Response Take 1 Tablet by mouth 2 times a day. 30 Tablet 0    cyanocobalamin (VITAMIN B-12) 100 MCG Tab Take 100 mcg by mouth every day.      omeprazole (PRILOSEC) 20 MG delayed-release capsule Take  "1 Capsule by mouth every day. 90 Capsule 3    atorvastatin (LIPITOR) 10 MG Tab Take 1 Tablet by mouth every day. 90 Tablet 3    VITAMIN D PO Take 1 Tablet by mouth every day.      ibuprofen (MOTRIN) 200 MG Tab Take 400 mg by mouth every 8 hours as needed. Indications: Pain       No current facility-administered medications for this visit.       Allergies   Allergen Reactions    Compazine     Pcn [Penicillins]     Sulfa Drugs        ROS  Constitutional: Negative. Negative for fever, chills, weight loss, malaise/fatigue and diaphoresis.   HENT: Negative. Negative for hearing loss, ear pain, nosebleeds, congestion, sore throat, neck pain, tinnitus and ear discharge.   Respiratory: Negative. Negative for cough, hemoptysis, sputum production, shortness of breath, wheezing and stridor.   Cardiovascular: Negative. Negative for chest pain, palpitations, orthopnea, claudication, leg swelling and PND.   Gastrointestinal: Denies nausea, vomiting, diarrhea, constipation, heartburn, melena or hematochezia.  Genitourinary: Denies dysuria, hematuria, urinary incontinence, frequency or urgency.        Objective:     /60 (BP Location: Right arm, Patient Position: Sitting, BP Cuff Size: Adult)   Pulse 70   Temp 36.6 °C (97.8 °F) (Temporal)   Ht 1.62 m (5' 3.78\")   Wt 67.6 kg (149 lb)   SpO2 97%  Body mass index is 25.75 kg/m².    Physical Exam:  Vitals reviewed.  Constitutional: Oriented to person, place, and time. appears well-developed and well-nourished. No distress.   Cardiovascular: Normal rate, regular rhythm, normal heart sounds and intact distal pulses. Exam reveals no gallop and no friction rub. No murmur heard. No carotid bruits.   Pulmonary/Chest: Effort normal and breath sounds normal. No stridor. No respiratory distress. no wheezes or rales. exhibits no tenderness.   Musculoskeletal: Normal range of motion. exhibits no edema. osbaldo pedal pulses 2+.  Lymphadenopathy: No cervical or supraclavicular adenopathy. "   Neurological: Alert and oriented to person, place, and time. exhibits normal muscle tone.  Skin: Skin is warm and dry. No diaphoresis.   Psychiatric: Normal mood and affect. Behavior is normal.      Assessment and Plan:     The following treatment plan was discussed:    1. Right groin pain  gabapentin (NEURONTIN) 100 MG Cap    Referral to Pain Clinic    diclofenac DR (VOLTAREN) 75 MG Tablet Delayed Response    DISCONTINUED: diclofenac DR (VOLTAREN) 75 MG Tablet Delayed Response    continue PT.  neurontin 200 mg hs. RF med.  add voltaren      2. Chronic bilateral low back pain with right-sided sciatica  gabapentin (NEURONTIN) 100 MG Cap    Referral to Pain Clinic    diclofenac DR (VOLTAREN) 75 MG Tablet Delayed Response    DISCONTINUED: diclofenac DR (VOLTAREN) 75 MG Tablet Delayed Response    refer physiatry since current pain not well controlled.      3. DDD (degenerative disc disease), lumbar  gabapentin (NEURONTIN) 100 MG Cap    Referral to Pain Clinic    diclofenac DR (VOLTAREN) 75 MG Tablet Delayed Response    DISCONTINUED: diclofenac DR (VOLTAREN) 75 MG Tablet Delayed Response    PT not helping. refer physiatry for poss injections.      4. Hypertension, essential  losartan-hydrochlorothiazide (HYZAAR) 50-12.5 MG per tablet    DISCONTINUED: losartan-hydrochlorothiazide (HYZAAR) 50-12.5 MG per tablet    stable and well controlled on meds. RF hyzaar            Followup: Return in about 3 months (around 4/2/2024), or email for lab slip.

## 2024-01-04 RX ORDER — LOSARTAN POTASSIUM AND HYDROCHLOROTHIAZIDE 12.5; 5 MG/1; MG/1
100 TABLET ORAL
Qty: 90 TABLET | Refills: 0 | OUTPATIENT
Start: 2024-01-04

## 2024-01-16 DIAGNOSIS — M54.41 CHRONIC BILATERAL LOW BACK PAIN WITH RIGHT-SIDED SCIATICA: ICD-10-CM

## 2024-01-16 DIAGNOSIS — M51.36 DDD (DEGENERATIVE DISC DISEASE), LUMBAR: ICD-10-CM

## 2024-01-16 DIAGNOSIS — G89.29 CHRONIC BILATERAL LOW BACK PAIN WITH RIGHT-SIDED SCIATICA: ICD-10-CM

## 2024-01-16 DIAGNOSIS — R10.31 RIGHT GROIN PAIN: ICD-10-CM

## 2024-01-17 DIAGNOSIS — M51.36 DDD (DEGENERATIVE DISC DISEASE), LUMBAR: ICD-10-CM

## 2024-01-17 DIAGNOSIS — R10.31 RIGHT GROIN PAIN: ICD-10-CM

## 2024-01-17 DIAGNOSIS — G89.29 CHRONIC BILATERAL LOW BACK PAIN WITH RIGHT-SIDED SCIATICA: ICD-10-CM

## 2024-01-17 DIAGNOSIS — M54.41 CHRONIC BILATERAL LOW BACK PAIN WITH RIGHT-SIDED SCIATICA: ICD-10-CM

## 2024-01-17 RX ORDER — DICLOFENAC SODIUM 75 MG/1
75 TABLET, DELAYED RELEASE ORAL 2 TIMES DAILY
Qty: 30 TABLET | Refills: 3 | Status: SHIPPED | OUTPATIENT
Start: 2024-01-17

## 2024-01-17 RX ORDER — DICLOFENAC SODIUM 75 MG/1
75 TABLET, DELAYED RELEASE ORAL 2 TIMES DAILY
Qty: 30 TABLET | Refills: 3 | Status: SHIPPED
Start: 2024-01-17 | End: 2024-02-07

## 2024-01-18 NOTE — TELEPHONE ENCOUNTER
Received request via: Patient    Was the patient seen in the last year in this department? Yes    Does the patient have an active prescription (recently filled or refills available) for medication(s) requested? No    Does the patient have [facility] Plus and need 100 day supply (blood pressure, diabetes and cholesterol meds only)? Medication not for blood pressure, diabetes and cholesterol meds.  
(1) body pink, extremities blue

## 2024-02-02 ENCOUNTER — OFFICE VISIT (OUTPATIENT)
Dept: MEDICAL GROUP | Facility: MEDICAL CENTER | Age: 79
End: 2024-02-02
Payer: MEDICARE

## 2024-02-02 VITALS
HEIGHT: 63 IN | HEART RATE: 69 BPM | RESPIRATION RATE: 16 BRPM | BODY MASS INDEX: 26.4 KG/M2 | TEMPERATURE: 97.8 F | OXYGEN SATURATION: 96 % | WEIGHT: 149 LBS

## 2024-02-02 DIAGNOSIS — B02.9 HERPES ZOSTER WITHOUT COMPLICATION: ICD-10-CM

## 2024-02-02 PROCEDURE — 99213 OFFICE O/P EST LOW 20 MIN: CPT | Performed by: INTERNAL MEDICINE

## 2024-02-02 ASSESSMENT — FIBROSIS 4 INDEX: FIB4 SCORE: 1.47

## 2024-02-02 ASSESSMENT — PATIENT HEALTH QUESTIONNAIRE - PHQ9: CLINICAL INTERPRETATION OF PHQ2 SCORE: 0

## 2024-02-02 NOTE — PROGRESS NOTES
Established Patient    Mandie presents today with the following:    CC: Possible shingles    HPI:   Mandie is a 78 y.o. female who came in for above.     She was traveling to [place] last Wednesday. She was stressed and tired. Her sister in law passed away.   She felt like there was a bruise on her LUQ of abd /lower rib. Then, there is a bump like a boil that spread into a crop of bumps.  It is over a week now and then everything is crusted.         ROS:   As above    Patient Active Problem List    Diagnosis Date Noted    Chronic bilateral low back pain without sciatica 02/16/2022    DDD (degenerative disc disease), lumbar 02/16/2022    Generalized anxiety disorder 01/25/2021    Primary osteoarthritis involving multiple joints 01/07/2021    Hypertension, essential     GERD without esophagitis     Impaired fasting glucose     Internal hemorrhoids     Allergic rhinitis due to allergen 02/21/2014    Basal cell carcinoma     Vitamin D deficiency 11/03/2011    Hyperlipidemia with target LDL less than 130 11/03/2011    Cervical polyp     Kidney stones     Colon polyps        Current Outpatient Medications   Medication Sig Dispense Refill    diclofenac DR (VOLTAREN) 75 MG Tablet Delayed Response TAKE ONE TABLET BY MOUTH TWICE A DAY 30 Tablet 3    gabapentin (NEURONTIN) 100 MG Cap Take 2 Capsules by mouth at bedtime. 200 Capsule 3    losartan-hydrochlorothiazide (HYZAAR) 50-12.5 MG per tablet Take 1 Tablet by mouth every day. 100 Tablet 3    cyanocobalamin (VITAMIN B-12) 100 MCG Tab Take 100 mcg by mouth every day.      atorvastatin (LIPITOR) 10 MG Tab Take 1 Tablet by mouth every day. 90 Tablet 3    VITAMIN D PO Take 1 Tablet by mouth every day.      ibuprofen (MOTRIN) 200 MG Tab Take 400 mg by mouth every 8 hours as needed. Indications: Pain      diclofenac DR (VOLTAREN) 75 MG Tablet Delayed Response Take 1 Tablet by mouth 2 times a day. 30 Tablet 3    omeprazole (PRILOSEC) 20 MG delayed-release capsule Take 1  "Capsule by mouth every day. (Patient not taking: Reported on 2/2/2024) 90 Capsule 3     No current facility-administered medications for this visit.         Pulse 69   Temp 36.6 °C (97.8 °F)   Resp 16   Ht 1.6 m (5' 3\")   Wt 67.6 kg (149 lb)   LMP 03/01/1993   SpO2 96%   BMI 26.39 kg/m²     Physical Exam  General: Alert and oriented, No apparent distress.         Assessment and Plan    1. Herpes zoster without complication  A crop of healing and crusted vesicles with slight redness, small area 2-3 cm in LUQ of abdomen. Healed singles with minimal discomfort. No further action needed at this time for acute episode.   Recommended Shingrix after all the crusts fall off in a few weeks.      Return if symptoms worsen or fail to improve.           Signed by: Lucie Heredia M.D.    "

## 2024-02-05 ENCOUNTER — APPOINTMENT (OUTPATIENT)
Dept: MEDICAL GROUP | Facility: MEDICAL CENTER | Age: 79
End: 2024-02-05
Payer: MEDICARE

## 2024-02-07 ENCOUNTER — APPOINTMENT (OUTPATIENT)
Dept: MEDICAL GROUP | Facility: MEDICAL CENTER | Age: 79
End: 2024-02-07
Payer: MEDICARE

## 2024-02-07 ENCOUNTER — OFFICE VISIT (OUTPATIENT)
Dept: MEDICAL GROUP | Facility: MEDICAL CENTER | Age: 79
End: 2024-02-07
Payer: MEDICARE

## 2024-02-07 VITALS
SYSTOLIC BLOOD PRESSURE: 130 MMHG | TEMPERATURE: 98.1 F | BODY MASS INDEX: 23.78 KG/M2 | HEIGHT: 66 IN | WEIGHT: 148 LBS | HEART RATE: 62 BPM | DIASTOLIC BLOOD PRESSURE: 62 MMHG | OXYGEN SATURATION: 97 %

## 2024-02-07 DIAGNOSIS — E78.5 HYPERLIPIDEMIA WITH TARGET LDL LESS THAN 130: ICD-10-CM

## 2024-02-07 DIAGNOSIS — M54.41 CHRONIC BILATERAL LOW BACK PAIN WITH RIGHT-SIDED SCIATICA: ICD-10-CM

## 2024-02-07 DIAGNOSIS — B02.9 HERPES ZOSTER WITHOUT COMPLICATION: ICD-10-CM

## 2024-02-07 DIAGNOSIS — M51.36 DDD (DEGENERATIVE DISC DISEASE), LUMBAR: ICD-10-CM

## 2024-02-07 DIAGNOSIS — R10.31 RIGHT GROIN PAIN: ICD-10-CM

## 2024-02-07 DIAGNOSIS — R73.01 IFG (IMPAIRED FASTING GLUCOSE): ICD-10-CM

## 2024-02-07 DIAGNOSIS — E55.9 VITAMIN D DEFICIENCY: ICD-10-CM

## 2024-02-07 DIAGNOSIS — E53.8 DISORDER OF VITAMIN B12: ICD-10-CM

## 2024-02-07 DIAGNOSIS — G89.29 CHRONIC BILATERAL LOW BACK PAIN WITH RIGHT-SIDED SCIATICA: ICD-10-CM

## 2024-02-07 PROCEDURE — 3078F DIAST BP <80 MM HG: CPT | Performed by: NURSE PRACTITIONER

## 2024-02-07 PROCEDURE — 99214 OFFICE O/P EST MOD 30 MIN: CPT | Performed by: NURSE PRACTITIONER

## 2024-02-07 PROCEDURE — 3075F SYST BP GE 130 - 139MM HG: CPT | Performed by: NURSE PRACTITIONER

## 2024-02-07 RX ORDER — TIZANIDINE 4 MG/1
4 TABLET ORAL NIGHTLY PRN
Qty: 15 TABLET | Refills: 0 | Status: SHIPPED | OUTPATIENT
Start: 2024-02-07

## 2024-02-07 ASSESSMENT — FIBROSIS 4 INDEX: FIB4 SCORE: 1.47

## 2024-02-08 NOTE — PROGRESS NOTES
Subjective:     Mandie Juan is a 78 y.o. female who presents with shingles.    HPI:   Seen in f/u for shingles.  She was seen last friday and dx with shingles. It is on her left anterior chest wall.  Still mildly tender.  She was told that   She is getting PT on her left hip pain.  Yesterday they did a different routine.  Now today the pain is worse.  It is radiating around from her rt SI joint to her rt groin.  No sweling. No leg numbness.  She will be due updated lab in late april/early may.  She has IFG BUT NOT on med for DM.    She will also need her cholesterdol checked.   She has hx of low vitamin d.  She is on otc supplement andher f/u vitamin d was wnl.  She also had a low normal b12.  She is also on that suppelment.    Patient Active Problem List    Diagnosis Date Noted    Chronic bilateral low back pain without sciatica 02/16/2022    DDD (degenerative disc disease), lumbar 02/16/2022    Generalized anxiety disorder 01/25/2021    Primary osteoarthritis involving multiple joints 01/07/2021    Hypertension, essential     GERD without esophagitis     Impaired fasting glucose     Internal hemorrhoids     Allergic rhinitis due to allergen 02/21/2014    Basal cell carcinoma     Vitamin D deficiency 11/03/2011    Hyperlipidemia with target LDL less than 130 11/03/2011    Cervical polyp     Kidney stones     Colon polyps        Current medicines (including changes today)  Current Outpatient Medications   Medication Sig Dispense Refill    tizanidine (ZANAFLEX) 4 MG Tab Take 1 Tablet by mouth at bedtime as needed (rt hip and groin pain). 15 Tablet 0    diclofenac DR (VOLTAREN) 75 MG Tablet Delayed Response TAKE ONE TABLET BY MOUTH TWICE A DAY 30 Tablet 3    diclofenac DR (VOLTAREN) 75 MG Tablet Delayed Response Take 1 Tablet by mouth 2 times a day. 30 Tablet 3    gabapentin (NEURONTIN) 100 MG Cap Take 2 Capsules by mouth at bedtime. 200 Capsule 3    losartan-hydrochlorothiazide (HYZAAR) 50-12.5 MG per  "tablet Take 1 Tablet by mouth every day. 100 Tablet 3    cyanocobalamin (VITAMIN B-12) 100 MCG Tab Take 100 mcg by mouth every day.      omeprazole (PRILOSEC) 20 MG delayed-release capsule Take 1 Capsule by mouth every day. (Patient not taking: Reported on 2/2/2024) 90 Capsule 3    atorvastatin (LIPITOR) 10 MG Tab Take 1 Tablet by mouth every day. 90 Tablet 3    VITAMIN D PO Take 1 Tablet by mouth every day.      ibuprofen (MOTRIN) 200 MG Tab Take 400 mg by mouth every 8 hours as needed. Indications: Pain       No current facility-administered medications for this visit.       Allergies   Allergen Reactions    Compazine     Pcn [Penicillins]     Sulfa Drugs        ROS  Constitutional: Negative. Negative for fever, chills, weight loss, malaise/fatigue and diaphoresis.   HENT: Negative. Negative for hearing loss, ear pain, nosebleeds, congestion, sore throat, neck pain, tinnitus and ear discharge.   Respiratory: Negative. Negative for cough, hemoptysis, sputum production, shortness of breath, wheezing and stridor.   Cardiovascular: Negative. Negative for chest pain, palpitations, orthopnea, claudication, leg swelling and PND.   Gastrointestinal: Denies nausea, vomiting, diarrhea, constipation, heartburn, melena or hematochezia.  Genitourinary: Denies dysuria, hematuria, urinary incontinence, frequency or urgency.        Objective:     /62 (BP Location: Left arm, Patient Position: Sitting, BP Cuff Size: Adult)   Pulse 62   Temp 36.7 °C (98.1 °F) (Temporal)   Ht 1.67 m (5' 5.75\")   Wt 67.1 kg (148 lb)   SpO2 97%  Body mass index is 24.07 kg/m².    Physical Exam:  Physical Exam   Vitals reviewed.  Constitutional: oriented to person, place, and time. appears well-developed and well-nourished. No distress.   Cardiovascular: Normal rate, regular rhythm, normal heart sounds and intact distal pulses.  Exam reveals no gallop and no friction rub.  No murmur heard.  No carotid bruits.   Pulmonary/Chest: Effort normal " and breath sounds normal. No stridor. No respiratory distress. no wheezes or rales. exhibits no tenderness.   Musculoskeletal: Normal range of motion. exhibits no edema. ruperto pedal pulses 2+.  Ruperto LE with neg SLR.  No tenderness in rt groin with palp.   Lymphadenopathy: no cervical or supraclavicular adenopathy.   Abd:  No CVAT,  Soft,  Bs noted in all quadrants.  No HSM.  No abdominal tenderness.fhealing shingles sores in LUQ w/o erythema, swelling or infection.    Neurological: alert and oriented to person, place, and time. exhibits normal muscle tone. Coordination normal.   Skin: Skin is warm and dry. no diaphoresis.   Psychiatric: normal mood and affect. behavior is normal.        Assessment and Plan:     The following treatment plan was discussed:    1. Right groin pain  tizanidine (ZANAFLEX) 4 MG Tab    rest rt leg and groin til next tuesday. use voltaren & gabapentin per sched. add tizanidine at nite if wakewith pain only.      2. Chronic bilateral low back pain with right-sided sciatica  tizanidine (ZANAFLEX) 4 MG Tab      3. DDD (degenerative disc disease), lumbar  tizanidine (ZANAFLEX) 4 MG Tab    multiple levels of DDD.  current rt buttock and groin pain worse since last PT.      4. Herpes zoster without complication      healing well w/o pain or sx of infectoin. continue neurontin      5. IFG (impaired fasting glucose)  Comp Metabolic Panel    MICROALBUMIN CREAT RATIO URINE    do updated lab for a1c, LP, B12, vitamin d in late april/early may. f/u for review.      6. Hyperlipidemia with target LDL less than 130  Comp Metabolic Panel    Lipid Profile    stable on lipitor. do updated lab and f/ for review 2 mo      7. Disorder of vitamin B12  VITAMIN B12    continue otc B12 and vitamin d. do updated lab. f/u for review 5/24.      8. Vitamin D deficiency              Followup: Return in about 3 months (around 5/7/2024), or for lab review.

## 2024-03-19 ENCOUNTER — TELEPHONE (OUTPATIENT)
Dept: HEALTH INFORMATION MANAGEMENT | Facility: OTHER | Age: 79
End: 2024-03-19
Payer: MEDICARE

## 2024-04-09 DIAGNOSIS — M51.36 DDD (DEGENERATIVE DISC DISEASE), LUMBAR: ICD-10-CM

## 2024-04-09 DIAGNOSIS — M54.41 CHRONIC BILATERAL LOW BACK PAIN WITH RIGHT-SIDED SCIATICA: ICD-10-CM

## 2024-04-09 DIAGNOSIS — R10.31 RIGHT GROIN PAIN: ICD-10-CM

## 2024-04-09 DIAGNOSIS — G89.29 CHRONIC BILATERAL LOW BACK PAIN WITH RIGHT-SIDED SCIATICA: ICD-10-CM

## 2024-04-10 NOTE — TELEPHONE ENCOUNTER
Received request via: Pharmacy    Was the patient seen in the last year in this department? Yes    Does the patient have an active prescription (recently filled or refills available) for medication(s) requested? No    Pharm[facility] Name: [facility]    Does the patient have [facility] and need 100 day supply (blood pressure, diabetes and cholesterol meds only)? Medication is not for cholesterol, blood pressure or diabetes

## 2024-04-11 NOTE — TELEPHONE ENCOUNTER
Caller Name: Mandie Juan    Call Back Number: [phone] (home) [phone] (work)      How would the patient prefer to be contacted with a response: Phone call OK to leave a detailed message    Patient left VM to request med refill for diclofenac DR (VOLTAREN) 75 MG Tablet Delayed Response. Refill request has already been sent 04/09/2024

## 2024-04-14 DIAGNOSIS — E53.8 B12 DEFICIENCY: ICD-10-CM

## 2024-04-14 DIAGNOSIS — I10 HYPERTENSION, ESSENTIAL: ICD-10-CM

## 2024-04-14 DIAGNOSIS — M54.41 CHRONIC BILATERAL LOW BACK PAIN WITH RIGHT-SIDED SCIATICA: ICD-10-CM

## 2024-04-14 DIAGNOSIS — M51.36 DDD (DEGENERATIVE DISC DISEASE), LUMBAR: ICD-10-CM

## 2024-04-14 DIAGNOSIS — R10.31 RIGHT GROIN PAIN: ICD-10-CM

## 2024-04-14 DIAGNOSIS — E55.9 VITAMIN D DEFICIENCY: ICD-10-CM

## 2024-04-14 DIAGNOSIS — E78.5 HYPERLIPIDEMIA WITH TARGET LDL LESS THAN 130: ICD-10-CM

## 2024-04-14 DIAGNOSIS — R73.01 IFG (IMPAIRED FASTING GLUCOSE): ICD-10-CM

## 2024-04-14 DIAGNOSIS — E53.8 DISORDER OF VITAMIN B12: ICD-10-CM

## 2024-04-14 DIAGNOSIS — G89.29 CHRONIC BILATERAL LOW BACK PAIN WITH RIGHT-SIDED SCIATICA: ICD-10-CM

## 2024-04-14 RX ORDER — DICLOFENAC SODIUM 75 MG/1
75 TABLET, DELAYED RELEASE ORAL 2 TIMES DAILY
Qty: 30 TABLET | Refills: 0 | Status: SHIPPED | OUTPATIENT
Start: 2024-04-14

## 2024-04-14 RX ORDER — DICLOFENAC SODIUM 75 MG/1
75 TABLET, DELAYED RELEASE ORAL 2 TIMES DAILY
Qty: 15 TABLET | Refills: 0 | Status: SHIPPED | OUTPATIENT
Start: 2024-04-14 | End: 2024-04-14 | Stop reason: SDUPTHER

## 2024-05-15 ENCOUNTER — HOSPITAL ENCOUNTER (OUTPATIENT)
Dept: RADIOLOGY | Facility: MEDICAL CENTER | Age: 79
End: 2024-05-15
Attending: NURSE PRACTITIONER
Payer: MEDICARE

## 2024-05-15 ENCOUNTER — OFFICE VISIT (OUTPATIENT)
Dept: MEDICAL GROUP | Facility: MEDICAL CENTER | Age: 79
End: 2024-05-15
Payer: MEDICARE

## 2024-05-15 VITALS
DIASTOLIC BLOOD PRESSURE: 74 MMHG | HEART RATE: 81 BPM | WEIGHT: 146.4 LBS | HEIGHT: 66 IN | SYSTOLIC BLOOD PRESSURE: 118 MMHG | BODY MASS INDEX: 23.53 KG/M2 | TEMPERATURE: 97.9 F | OXYGEN SATURATION: 96 %

## 2024-05-15 DIAGNOSIS — S89.91XA RIGHT KNEE INJURY, INITIAL ENCOUNTER: ICD-10-CM

## 2024-05-15 DIAGNOSIS — E78.5 HYPERLIPIDEMIA WITH TARGET LDL LESS THAN 130: ICD-10-CM

## 2024-05-15 DIAGNOSIS — K08.9 DENTAL DISORDER: ICD-10-CM

## 2024-05-15 PROCEDURE — 99214 OFFICE O/P EST MOD 30 MIN: CPT | Performed by: NURSE PRACTITIONER

## 2024-05-15 PROCEDURE — 3074F SYST BP LT 130 MM HG: CPT | Performed by: NURSE PRACTITIONER

## 2024-05-15 PROCEDURE — 3078F DIAST BP <80 MM HG: CPT | Performed by: NURSE PRACTITIONER

## 2024-05-15 RX ORDER — ATORVASTATIN CALCIUM 10 MG/1
10 TABLET, FILM COATED ORAL
Qty: 100 TABLET | Refills: 3 | Status: SHIPPED | OUTPATIENT
Start: 2024-05-15

## 2024-05-15 ASSESSMENT — FIBROSIS 4 INDEX: FIB4 SCORE: 1.47

## 2024-05-15 NOTE — PROGRESS NOTES
Subjective:     Mandie Juan is a 78 y.o. female who presents with rt knee injury.    HPI:     Seen in f/u for rt knee injury.    Problem #1:dental work  She is going to have bone grafting and significant dental work.  They are going to do the work soon. They want to do it next week but she is going to postpone til after an event in June.  She will hold her nsaids 10 days before.      Problem #2:fall  She was getting PT for rt hip and groin pain.  It resolved with PT.  Then day after finised PT she was walking her dog and tripped on her knees.  That caused severe rt knee pain.  That occurred 1.5 wks ago.  She is still having rt knee pain over knee cap.  Cannot bend on knee. She wants to do PT for it.  Only pain is in rt knee.     Problem #3:  HYPERLIPIDEMIA  She is stable on lipitor for her chol.  Needs med refilled.  She will do her lab in next month and f/u for review.  Stable on med.      Patient Active Problem List    Diagnosis Date Noted    Chronic bilateral low back pain without sciatica 02/16/2022    DDD (degenerative disc disease), lumbar 02/16/2022    Generalized anxiety disorder 01/25/2021    Primary osteoarthritis involving multiple joints 01/07/2021    Hypertension, essential     GERD without esophagitis     Impaired fasting glucose     Internal hemorrhoids     Allergic rhinitis due to allergen 02/21/2014    Basal cell carcinoma     Vitamin D deficiency 11/03/2011    Hyperlipidemia with target LDL less than 130 11/03/2011    Cervical polyp     Kidney stones     Colon polyps        Current medicines (including changes today)  Current Outpatient Medications   Medication Sig Dispense Refill    atorvastatin (LIPITOR) 10 MG Tab Take 1 Tablet by mouth every day. 100 Tablet 3    diclofenac DR (VOLTAREN) 75 MG Tablet Delayed Response Take 1 Tablet by mouth 2 times a day. 30 Tablet 0    tizanidine (ZANAFLEX) 4 MG Tab Take 1 Tablet by mouth at bedtime as needed (rt hip and groin pain). 15 Tablet 0     "gabapentin (NEURONTIN) 100 MG Cap Take 2 Capsules by mouth at bedtime. 200 Capsule 3    losartan-hydrochlorothiazide (HYZAAR) 50-12.5 MG per tablet Take 1 Tablet by mouth every day. 100 Tablet 3    cyanocobalamin (VITAMIN B-12) 100 MCG Tab Take 100 mcg by mouth every day.      VITAMIN D PO Take 1 Tablet by mouth every day.      ibuprofen (MOTRIN) 200 MG Tab Take 400 mg by mouth every 8 hours as needed. Indications: Pain       No current facility-administered medications for this visit.       Allergies   Allergen Reactions    Compazine     Pcn [Penicillins]     Sulfa Drugs        ROS  Constitutional: Negative. Negative for fever, chills, weight loss, malaise/fatigue and diaphoresis.   HENT: Negative. Negative for hearing loss, ear pain, nosebleeds, congestion, sore throat, neck pain, tinnitus and ear discharge.   Respiratory: Negative. Negative for cough, hemoptysis, sputum production, shortness of breath, wheezing and stridor.   Cardiovascular: Negative. Negative for chest pain, palpitations, orthopnea, claudication, leg swelling and PND.   Gastrointestinal: Denies nausea, vomiting, diarrhea, constipation, heartburn, melena or hematochezia.  Genitourinary: Denies dysuria, hematuria, urinary incontinence, frequency or urgency.        Objective:     /74 (BP Location: Left arm, Patient Position: Sitting, BP Cuff Size: Adult)   Pulse 81   Temp 36.6 °C (97.9 °F) (Temporal)   Ht 1.67 m (5' 5.75\")   Wt 66.4 kg (146 lb 6.4 oz)   SpO2 96%  Body mass index is 23.81 kg/m².    Physical Exam:  Vitals reviewed.  Constitutional: Oriented to person, place, and time. appears well-developed and well-nourished. No distress.   Cardiovascular: Normal rate, regular rhythm, normal heart sounds and intact distal pulses. Exam reveals no gallop and no friction rub. No murmur heard. No carotid bruits.   Pulmonary/Chest: Effort normal and breath sounds normal. No stridor. No respiratory distress. no wheezes or rales. exhibits no " tenderness.   Musculoskeletal: Normal range of motion. exhibits no edema. ruperto pedal pulses 2+.  Ruperto neg drawer, edgard, lachman test both knees.  Rt knee mildly swollen.  Lymphadenopathy: No cervical or supraclavicular adenopathy.   Neurological: Alert and oriented to person, place, and time. exhibits normal muscle tone.  Skin: Skin is warm and dry. No diaphoresis.   Psychiatric: Normal mood and affect. Behavior is normal.      Assessment and Plan:     The following treatment plan was discussed:    1. Right knee injury, initial encounter  DX-KNEE COMPLETE 4+ RIGHT    Referral to Physical Therapy    fall with rt knee injury.  xray rt knee. f/u w/pt w/results.  refer PT.  if not improved w/PT will consider MRI.      2. Hyperlipidemia with target LDL less than 130  atorvastatin (LIPITOR) 10 MG Tab    stable on meds. RF lipitor.  do lab and f/u for review 1 mo      3. Dental disorder      going to have dental work done soon.  will hold nsaids 10 days prior.            Followup: Return in about 4 weeks (around 6/12/2024), or for lab review.

## 2024-07-16 ENCOUNTER — OFFICE VISIT (OUTPATIENT)
Dept: MEDICAL GROUP | Facility: MEDICAL CENTER | Age: 79
End: 2024-07-16
Payer: MEDICARE

## 2024-07-16 VITALS
DIASTOLIC BLOOD PRESSURE: 72 MMHG | OXYGEN SATURATION: 98 % | HEIGHT: 66 IN | TEMPERATURE: 98.6 F | BODY MASS INDEX: 23.16 KG/M2 | WEIGHT: 144.1 LBS | SYSTOLIC BLOOD PRESSURE: 112 MMHG | HEART RATE: 63 BPM

## 2024-07-16 DIAGNOSIS — M25.562 ACUTE PAIN OF LEFT KNEE: ICD-10-CM

## 2024-07-16 PROCEDURE — 3074F SYST BP LT 130 MM HG: CPT | Performed by: NURSE PRACTITIONER

## 2024-07-16 PROCEDURE — 3078F DIAST BP <80 MM HG: CPT | Performed by: NURSE PRACTITIONER

## 2024-07-16 PROCEDURE — 99214 OFFICE O/P EST MOD 30 MIN: CPT | Performed by: NURSE PRACTITIONER

## 2024-07-16 RX ORDER — HYDROCODONE BITARTRATE AND ACETAMINOPHEN 5; 325 MG/1; MG/1
TABLET ORAL
COMMUNITY
Start: 2024-07-01 | End: 2024-08-21

## 2024-07-16 RX ORDER — CEPHALEXIN 500 MG/1
CAPSULE ORAL
COMMUNITY
Start: 2024-07-01 | End: 2024-08-13

## 2024-07-16 ASSESSMENT — FIBROSIS 4 INDEX: FIB4 SCORE: 1.47

## 2024-07-16 NOTE — PROGRESS NOTES
Subjective:     History of Present Illness  The patient is an 78-year-old female seen in follow-up for right knee pain.    The patient experienced a fall on her left knee, however, no fractures were detected. She is currently undergoing physical therapy for her left knee, which commenced approximately a week ago. She has not undergone an x-ray or MRI. The pain intensifies when she stands up, radiates down her leg, but does not extend to her bones. She has a history of back injury. Her current medication regimen includes hydrocodone 5/325 mg, diclofenac, gabapentin, and Keflex. She is scheduled to have her stitches removed tomorrow. A bone graft was also performed. She has not yet consulted with orthopedics. Topical application of Voltaren on both knees has been beneficial. Gabapentin is taken at night, and she reports no nocturnal pain. She maintains an active lifestyle, walking her dog.      Results    Lab reviewed with pt:  CMP - ***  CBC - ***  LP - ***  Hemoglobin A1c - ***  Alb/cr ratio - ***      Current medicines (including changes today)  Current Outpatient Medications   Medication Sig Dispense Refill    cephALEXin (KEFLEX) 500 MG Cap       HYDROcodone-acetaminophen (NORCO) 5-325 MG Tab per tablet       atorvastatin (LIPITOR) 10 MG Tab Take 1 Tablet by mouth every day. 100 Tablet 3    diclofenac DR (VOLTAREN) 75 MG Tablet Delayed Response Take 1 Tablet by mouth 2 times a day. 30 Tablet 0    tizanidine (ZANAFLEX) 4 MG Tab Take 1 Tablet by mouth at bedtime as needed (rt hip and groin pain). 15 Tablet 0    gabapentin (NEURONTIN) 100 MG Cap Take 2 Capsules by mouth at bedtime. 200 Capsule 3    losartan-hydrochlorothiazide (HYZAAR) 50-12.5 MG per tablet Take 1 Tablet by mouth every day. 100 Tablet 3    cyanocobalamin (VITAMIN B-12) 100 MCG Tab Take 100 mcg by mouth every day.      VITAMIN D PO Take 1 Tablet by mouth every day.      ibuprofen (MOTRIN) 200 MG Tab Take 400 mg by mouth every 8 hours as needed.  Indications: Pain       No current facility-administered medications for this visit.     She  has a past medical history of Allergy, Basal cell carcinoma (2004), Cervical polyp, Chronic bilateral low back pain without sciatica (2/16/2022), Colon polyps, DDD (degenerative disc disease), lumbar (2/16/2022), Episode of recurrent major depressive disorder (HCC) (1/25/2021), Generalized anxiety disorder (1/25/2021), GERD without esophagitis, Hyperlipidemia, Hypertension, essential, Impaired fasting glucose, Internal hemorrhoids, Kidney stones, Primary osteoarthritis involving multiple joints (1/7/2021), and Vitamin d deficiency.    She has no past medical history of Breast cancer (HCC) or Clotting disorder (HCC).    Hemoglobin A1c:***  Lab Results   Component Value Date/Time    HBA1C 6.1 (H) 04/14/2023 06:20 AM    HBA1C 5.9 (H) 09/09/2022 12:16 PM    HBA1C 6.2 (H) 03/16/2022 06:28 AM       Microalbumin/creatinine Ratio:***  Lab Results   Component Value Date/Time    URCREAT 186.59 04/14/2023 0620    MICROALBUR <1.2 04/14/2023 0620    MALBCRT see below 04/14/2023 0620       Lipid Panel:***  Lab Results   Component Value Date/Time    CHOLSTRLTOT 180 04/14/2023 0620    TRIGLYCERIDE 75 04/14/2023 0620    LDL 93 04/14/2023 0620    CHOLHDLRAT 3.6 11/25/2014 0818       Thyroid:***  Lab Results   Component Value Date/Time    TSHULTRASEN 2.890 03/14/2023 1122     Lab Results   Component Value Date/Time    FREET4 0.80 03/23/2017 0810       Complete Blood Count:***  Lab Results   Component Value Date/Time    WBC 4.7 (L) 10/01/2022 0453    RBC 4.59 10/01/2022 0453    HEMOGLOBIN 13.8 10/01/2022 0453    HEMATOCRIT 40.7 10/01/2022 0453    MCV 88.7 10/01/2022 0453    MCH 30.1 10/01/2022 0453    MCHC 33.9 10/01/2022 0453    RDW 40.1 10/01/2022 0453    MPV 9.6 10/01/2022 0453    LYMPHOCYTES 14.60 (L) 09/30/2022 1307    LYMPHS 1.72 09/30/2022 1307    MONOCYTES 6.10 09/30/2022 1307    MONOS 0.72 09/30/2022 1307    EOSINOPHILS 0.50  09/30/2022 1307    EOS 0.06 09/30/2022 1307    BASOPHILS 0.30 09/30/2022 1307    BASO 0.03 09/30/2022 1307    NRBC 0.00 09/30/2022 1307       Complete Metabolic Panel:***  Lab Results   Component Value Date/Time    SODIUM 140 04/14/2023 06:20 AM    POTASSIUM 3.7 04/14/2023 06:20 AM    CHLORIDE 104 04/14/2023 06:20 AM    CO2 26 04/14/2023 06:20 AM    ANION 10.0 04/14/2023 06:20 AM    GLUCOSE 110 (H) 04/14/2023 06:20 AM    BUN 19 04/14/2023 06:20 AM    CREATININE 0.79 04/14/2023 06:20 AM    CREATININE 0.68 11/21/2012 10:00 AM    ASTSGOT 19 04/14/2023 06:20 AM    ALTSGPT 18 04/14/2023 06:20 AM    TBILIRUBIN 0.3 04/14/2023 06:20 AM    ALBUMIN 4.0 04/14/2023 06:20 AM    TOTPROTEIN 7.0 04/14/2023 06:20 AM    GLOBULIN 3.0 04/14/2023 06:20 AM    AGRATIO 1.3 04/14/2023 06:20 AM         Vitamin D:***    Lab Results   Component Value Date/Time    25HYDROXY 40 04/14/2023 0620       GFR:***    Lab Results   Component Value Date/Time    GFRCKD 76 04/14/2023 0620           ROS ***  Review of Systems   Constitutional: Negative.  Negative for fever, chills, weight loss, malaise/fatigue and diaphoresis.   HENT: Negative.  Negative for hearing loss, ear pain, nosebleeds, congestion, sore throat, neck pain, tinnitus and ear discharge.    Respiratory: Negative.  Negative for cough, hemoptysis, sputum production, shortness of breath, wheezing and stridor.    Cardiovascular: Negative.  Negative for chest pain, palpitations, orthopnea, claudication, leg swelling and PND.   Gastrointestinal: denies nausea, vomiting, diarrhea, constipation, heartburn, melena or hematochezia.  Genitourinary: Denies dysuria, hematuria, urinary incontinence, frequency or urgency.    Musculoskeletal: Negative.  Negative for myalgias and back pain.   Neurological: Negative.  Negative for dizziness, tingling, tremors, weakness and headaches.   Psych:  Denies depression, anxiety or insomnia.  All other systems reviewed and are negative.         Objective:     BP  "112/72 (BP Location: Left arm, Patient Position: Sitting, BP Cuff Size: Adult)   Pulse 63   Temp 37 °C (98.6 °F) (Temporal)   Ht 1.67 m (5' 5.75\")   Wt 65.4 kg (144 lb 1.6 oz)   SpO2 98%  Body mass index is 23.44 kg/m². ***  Physical Exam    Physical Exam   Vitals reviewed.  Constitutional: oriented to person, place, and time. appears well-developed and well-nourished. No distress.   Neck: No JVD present.  Cardiovascular: Normal rate, regular rhythm, normal heart sounds and intact distal pulses.  Exam reveals no gallop and no friction rub.  No murmur heard.  No carotid bruits.   Pulmonary/Chest: Effort normal and breath sounds normal. No stridor. No respiratory distress. no wheezes or rales. exhibits no tenderness.   Musculoskeletal: Normal range of motion. exhibits no edema. osbaldo pedal pulses 2+.osbaldo knees with negative drawer, benson and lachman test.  Lymphadenopathy: no cervical or supraclavicular adenopathy.   Neurological: alert and oriented to person, place, and time. exhibits normal muscle tone. Coordination normal.   Skin: Skin is warm and dry. no diaphoresis.   Psychiatric: normal mood and affect. behavior is normal.           Assessment and Plan:   The following treatment plan was discussed      Assessment & Plan  1. Acute left knee pain.  An x-ray of the left knee has been ordered. The patient is advised to persist with bilateral knee physical therapy. A referral to orthopedics has been made. Laboratory tests have been ordered.    Follow-up  A follow-up appointment is scheduled for 1 month from now.      ORDERS:  1. Acute pain of left knee  ***  - DX-KNEE COMPLETE 4+ LEFT; Future  - Referral to Orthopedics          Please note that this dictation was created using voice recognition software. I have made every reasonable attempt to correct obvious errors, but I expect that there are errors of grammar and possibly content that I did not discover before finalizing the note.      Attestation      Verbal " consent was acquired by the patient to use [employer] ambient listening note generation during this visit {YES/[id number]}

## 2024-07-22 ENCOUNTER — APPOINTMENT (OUTPATIENT)
Dept: RADIOLOGY | Facility: MEDICAL CENTER | Age: 79
End: 2024-07-22
Attending: NURSE PRACTITIONER
Payer: MEDICARE

## 2024-07-22 DIAGNOSIS — M25.562 ACUTE PAIN OF LEFT KNEE: ICD-10-CM

## 2024-07-22 PROCEDURE — 73564 X-RAY EXAM KNEE 4 OR MORE: CPT | Mod: LT

## 2024-07-29 ENCOUNTER — HOSPITAL ENCOUNTER (OUTPATIENT)
Dept: LAB | Facility: MEDICAL CENTER | Age: 79
End: 2024-07-29
Attending: NURSE PRACTITIONER
Payer: MEDICARE

## 2024-07-29 DIAGNOSIS — R73.01 IFG (IMPAIRED FASTING GLUCOSE): ICD-10-CM

## 2024-07-29 DIAGNOSIS — E55.9 VITAMIN D DEFICIENCY: ICD-10-CM

## 2024-07-29 DIAGNOSIS — I10 HYPERTENSION, ESSENTIAL: ICD-10-CM

## 2024-07-29 DIAGNOSIS — E53.8 B12 DEFICIENCY: ICD-10-CM

## 2024-07-29 DIAGNOSIS — E78.5 HYPERLIPIDEMIA WITH TARGET LDL LESS THAN 130: ICD-10-CM

## 2024-07-29 LAB
25(OH)D3 SERPL-MCNC: 35 NG/ML (ref 30–100)
ALBUMIN SERPL BCP-MCNC: 4.1 G/DL (ref 3.2–4.9)
ALBUMIN/GLOB SERPL: 1.6 G/DL
ALP SERPL-CCNC: 76 U/L (ref 30–99)
ALT SERPL-CCNC: 22 U/L (ref 2–50)
ANION GAP SERPL CALC-SCNC: 11 MMOL/L (ref 7–16)
AST SERPL-CCNC: 21 U/L (ref 12–45)
BILIRUB SERPL-MCNC: 0.4 MG/DL (ref 0.1–1.5)
BUN SERPL-MCNC: 16 MG/DL (ref 8–22)
CALCIUM ALBUM COR SERPL-MCNC: 9.6 MG/DL (ref 8.5–10.5)
CALCIUM SERPL-MCNC: 9.7 MG/DL (ref 8.5–10.5)
CHLORIDE SERPL-SCNC: 103 MMOL/L (ref 96–112)
CHOLEST SERPL-MCNC: 177 MG/DL (ref 100–199)
CO2 SERPL-SCNC: 25 MMOL/L (ref 20–33)
CREAT SERPL-MCNC: 0.62 MG/DL (ref 0.5–1.4)
CREAT UR-MCNC: 95.73 MG/DL
EST. AVERAGE GLUCOSE BLD GHB EST-MCNC: 123 MG/DL
FASTING STATUS PATIENT QL REPORTED: NORMAL
GFR SERPLBLD CREATININE-BSD FMLA CKD-EPI: 90 ML/MIN/1.73 M 2
GLOBULIN SER CALC-MCNC: 2.5 G/DL (ref 1.9–3.5)
GLUCOSE SERPL-MCNC: 108 MG/DL (ref 65–99)
HBA1C MFR BLD: 5.9 % (ref 4–5.6)
HDLC SERPL-MCNC: 81 MG/DL
LDLC SERPL CALC-MCNC: 82 MG/DL
MICROALBUMIN UR-MCNC: <1.2 MG/DL
MICROALBUMIN/CREAT UR: NORMAL MG/G (ref 0–30)
POTASSIUM SERPL-SCNC: 4.2 MMOL/L (ref 3.6–5.5)
PROT SERPL-MCNC: 6.6 G/DL (ref 6–8.2)
SODIUM SERPL-SCNC: 139 MMOL/L (ref 135–145)
TRIGL SERPL-MCNC: 68 MG/DL (ref 0–149)
VIT B12 SERPL-MCNC: 1150 PG/ML (ref 211–911)

## 2024-07-29 PROCEDURE — 82043 UR ALBUMIN QUANTITATIVE: CPT

## 2024-07-29 PROCEDURE — 36415 COLL VENOUS BLD VENIPUNCTURE: CPT

## 2024-07-29 PROCEDURE — 82607 VITAMIN B-12: CPT

## 2024-07-29 PROCEDURE — 80061 LIPID PANEL: CPT

## 2024-07-29 PROCEDURE — 80053 COMPREHEN METABOLIC PANEL: CPT

## 2024-07-29 PROCEDURE — 82570 ASSAY OF URINE CREATININE: CPT

## 2024-07-29 PROCEDURE — 82306 VITAMIN D 25 HYDROXY: CPT

## 2024-07-29 PROCEDURE — 83036 HEMOGLOBIN GLYCOSYLATED A1C: CPT

## 2024-08-04 DIAGNOSIS — M11.20 CALCIUM PYROPHOSPHATE DEPOSITION DISEASE (CPPD): ICD-10-CM

## 2024-08-13 ENCOUNTER — OFFICE VISIT (OUTPATIENT)
Dept: URGENT CARE | Facility: CLINIC | Age: 79
End: 2024-08-13
Payer: MEDICARE

## 2024-08-13 VITALS
HEART RATE: 68 BPM | RESPIRATION RATE: 16 BRPM | DIASTOLIC BLOOD PRESSURE: 80 MMHG | OXYGEN SATURATION: 95 % | BODY MASS INDEX: 23.99 KG/M2 | TEMPERATURE: 97.6 F | HEIGHT: 65 IN | WEIGHT: 144 LBS | SYSTOLIC BLOOD PRESSURE: 148 MMHG

## 2024-08-13 DIAGNOSIS — L03.031 PARONYCHIA OF GREAT TOE OF RIGHT FOOT: ICD-10-CM

## 2024-08-13 PROCEDURE — 3079F DIAST BP 80-89 MM HG: CPT | Performed by: NURSE PRACTITIONER

## 2024-08-13 PROCEDURE — 3077F SYST BP >= 140 MM HG: CPT | Performed by: NURSE PRACTITIONER

## 2024-08-13 PROCEDURE — 99213 OFFICE O/P EST LOW 20 MIN: CPT | Performed by: NURSE PRACTITIONER

## 2024-08-13 RX ORDER — CEPHALEXIN 500 MG/1
500 CAPSULE ORAL 4 TIMES DAILY
Qty: 28 CAPSULE | Refills: 0 | Status: SHIPPED | OUTPATIENT
Start: 2024-08-13 | End: 2024-08-20

## 2024-08-13 RX ORDER — CHLORHEXIDINE GLUCONATE ORAL RINSE 1.2 MG/ML
SOLUTION DENTAL
COMMUNITY
Start: 2024-07-01 | End: 2024-08-21

## 2024-08-13 ASSESSMENT — FIBROSIS 4 INDEX: FIB4 SCORE: 1.47

## 2024-08-18 NOTE — PROGRESS NOTES
Subjective     Mandie Juan is a 78 y.o. female who presents with Toe Pain (Right foot big toe, possible infection)            Pt reports possible infection to her right great toe that developed a few days ago after a pedicure. +redness, swelling, tenderness and drainage near the nail. Admits the redness and swelling are getting worse. No fevers. No streaking. She has not tried any treatments for this.         Review of Systems   Skin:         Possible infection to right great toe   All other systems reviewed and are negative.         Past Medical History:   Diagnosis Date    Allergy     Basal cell carcinoma 2004    Moh's surgery nose    Cervical polyp     benign    Chronic bilateral low back pain without sciatica 2/16/2022    Colon polyps     DDD (degenerative disc disease), lumbar 2/16/2022    Episode of recurrent major depressive disorder (HCC) 1/25/2021    Generalized anxiety disorder 1/25/2021    GERD without esophagitis     Hyperlipidemia     Hypertension, essential     Impaired fasting glucose     Internal hemorrhoids     Kidney stones     Primary osteoarthritis involving multiple joints 1/7/2021    Vitamin d deficiency     History reviewed. No pertinent surgical history.   Social History     Socioeconomic History    Marital status:      Spouse name: Not on file    Number of children: Not on file    Years of education: Not on file    Highest education level: Not on file   Occupational History    Not on file   Tobacco Use    Smoking status: Never    Smokeless tobacco: Never   Vaping Use    Vaping status: Never Used   Substance and Sexual Activity    Alcohol use: Not Currently     Alcohol/week: 2.0 oz     Types: 4 Glasses of wine per week    Drug use: No    Sexual activity: Not Currently     Partners: Male   Other Topics Concern    Not on file   Social History Narrative    Not on file     Social Determinants of Health     Financial Resource Strain: Not on file   Food Insecurity: Not on file  "  Transportation Needs: Not on file   Physical Activity: Not on file   Stress: Not on file   Social Connections: Not on file   Intimate Partner Violence: Not on file   Housing Stability: Not on file         Objective     BP (!) 148/80 (BP Location: Left arm, Patient Position: Sitting, BP Cuff Size: Adult)   Pulse 68   Temp 36.4 °C (97.6 °F) (Temporal)   Resp 16   Ht 1.651 m (5' 5\")   Wt 65.3 kg (144 lb)   LMP 03/01/1993   SpO2 95%   BMI 23.96 kg/m²      Physical Exam  Vitals and nursing note reviewed.   Constitutional:       Appearance: Normal appearance. She is normal weight.   HENT:      Head: Normocephalic and atraumatic.      Nose: Nose normal.      Mouth/Throat:      Mouth: Mucous membranes are moist.      Pharynx: Oropharynx is clear.   Eyes:      Extraocular Movements: Extraocular movements intact.      Pupils: Pupils are equal, round, and reactive to light.   Cardiovascular:      Rate and Rhythm: Normal rate and regular rhythm.   Pulmonary:      Effort: Pulmonary effort is normal.   Musculoskeletal:         General: Normal range of motion.      Cervical back: Normal range of motion.        Feet:    Skin:     General: Skin is warm and dry.      Capillary Refill: Capillary refill takes less than 2 seconds.   Neurological:      General: No focal deficit present.      Mental Status: She is alert and oriented to person, place, and time. Mental status is at baseline.   Psychiatric:         Mood and Affect: Mood normal.         Speech: Speech normal.         Thought Content: Thought content normal.         Judgment: Judgment normal.                             Assessment & Plan        Assessment & Plan  Paronychia of great toe of right foot    Orders:    cephALEXin (KEFLEX) 500 MG Cap; Take 1 Capsule by mouth 4 times a day for 7 days.          Take full course of abx as directed  Warm epsom salt soaks TID for 3 days  Tylenol and ibuprofen as needed for pain  Supportive care, differential diagnoses, and " indications for immediate follow-up discussed with patient.    Pathogenesis of diagnosis discussed including typical length and natural progression.    Instructed to return to  or nearest emergency department if symptoms fail to improve, for any change in condition, further concerns, or new concerning symptoms.  Patient states understanding of the plan of care and discharge instructions.

## 2024-08-21 ENCOUNTER — OFFICE VISIT (OUTPATIENT)
Dept: MEDICAL GROUP | Facility: MEDICAL CENTER | Age: 79
End: 2024-08-21
Payer: MEDICARE

## 2024-08-21 VITALS
BODY MASS INDEX: 24.34 KG/M2 | HEART RATE: 60 BPM | OXYGEN SATURATION: 97 % | TEMPERATURE: 98.2 F | HEIGHT: 65 IN | WEIGHT: 146.1 LBS | SYSTOLIC BLOOD PRESSURE: 126 MMHG | DIASTOLIC BLOOD PRESSURE: 78 MMHG

## 2024-08-21 DIAGNOSIS — M25.562 CHRONIC PAIN OF BOTH KNEES: ICD-10-CM

## 2024-08-21 DIAGNOSIS — R41.3 MEMORY LOSS, SHORT TERM: ICD-10-CM

## 2024-08-21 DIAGNOSIS — E55.9 VITAMIN D DEFICIENCY: ICD-10-CM

## 2024-08-21 DIAGNOSIS — L60.0 INGROWING TOENAIL WITH INFECTION: ICD-10-CM

## 2024-08-21 DIAGNOSIS — Z12.31 ENCOUNTER FOR SCREENING MAMMOGRAM FOR MALIGNANT NEOPLASM OF BREAST: ICD-10-CM

## 2024-08-21 DIAGNOSIS — I10 HYPERTENSION, ESSENTIAL: ICD-10-CM

## 2024-08-21 DIAGNOSIS — E53.8 DISORDER OF VITAMIN B12: ICD-10-CM

## 2024-08-21 DIAGNOSIS — M54.50 CHRONIC BILATERAL LOW BACK PAIN WITHOUT SCIATICA: ICD-10-CM

## 2024-08-21 DIAGNOSIS — N95.9 POST MENOPAUSAL PROBLEMS: ICD-10-CM

## 2024-08-21 DIAGNOSIS — G89.29 CHRONIC BILATERAL LOW BACK PAIN WITHOUT SCIATICA: ICD-10-CM

## 2024-08-21 DIAGNOSIS — M25.561 CHRONIC PAIN OF BOTH KNEES: ICD-10-CM

## 2024-08-21 DIAGNOSIS — R73.01 IFG (IMPAIRED FASTING GLUCOSE): ICD-10-CM

## 2024-08-21 DIAGNOSIS — E78.5 HYPERLIPIDEMIA LDL GOAL <100: ICD-10-CM

## 2024-08-21 DIAGNOSIS — G89.29 CHRONIC PAIN OF BOTH KNEES: ICD-10-CM

## 2024-08-21 PROCEDURE — 3078F DIAST BP <80 MM HG: CPT | Performed by: NURSE PRACTITIONER

## 2024-08-21 PROCEDURE — 99214 OFFICE O/P EST MOD 30 MIN: CPT | Performed by: NURSE PRACTITIONER

## 2024-08-21 PROCEDURE — 3074F SYST BP LT 130 MM HG: CPT | Performed by: NURSE PRACTITIONER

## 2024-08-21 RX ORDER — CEPHALEXIN 500 MG/1
500 CAPSULE ORAL 4 TIMES DAILY
COMMUNITY

## 2024-08-21 ASSESSMENT — FIBROSIS 4 INDEX: FIB4 SCORE: 1.47

## 2024-08-21 NOTE — PROGRESS NOTES
Subjective:     History of Present Illness  The patient is an 78-year-old female seen in follow-up for foot pain.    She experienced severe pain and redness in her right big toe, similar to the sensation of stubbing her toe, following a pedicure. This led her to seek immediate medical attention at an urgent care facility. She was prescribed cephalexin, which she has been taking as directed. Today marks the last day of her antibiotic course. The urgent care physician also noted a fungal infection on her toe. She reports feeling fatigued and has been sleeping excessively, but it's unclear whether this is due to the infection or the medication.    She has discontinued the use of tizanidine, ibuprofen, and hydrocodone. Her current medications include losartan, atorvastatin, gabapentin (2 pills), over-the-counter B12, and vitamin D. She does not require any medication refills at this time.  Her bp is stable and well controlled on med.      She maintains a regular exercise routine, walking daily and attending physical therapy sessions. She also ensures adequate hydration by drinking plenty of fluids.     She has noticed some memory issues, particularly with recalling names, but these memories often return later. She continues to attend grief counseling sessions after her  passed 5 yrs ago.    She had a fall on her knee and had an x-ray of the left knee. She has almost finished with physical therapy for osbaldo knee pain. It still hurts to kneel on it, but it is much better. Her back will hurt if she lifts something wrong.    SOCIAL HISTORY  The patient does not smoke.      Lab reviewed with pt:  GFR, CMP, LP, alb/cr ratio, b12 IS WNL  A1c down from 6.1 to 5.9. not on med for DM.   Stable and well controlled on lipitor for chol.   Vitamin D down from 40 last yr to 35.  Previous was all in 20's.  She is only taking 1000 units d3 otc      Current medicines (including changes today)  Current Outpatient Medications    Medication Sig Dispense Refill    cephALEXin (KEFLEX) 500 MG Cap Take 500 mg by mouth 4 times a day.      atorvastatin (LIPITOR) 10 MG Tab Take 1 Tablet by mouth every day. 100 Tablet 3    gabapentin (NEURONTIN) 100 MG Cap Take 2 Capsules by mouth at bedtime. 200 Capsule 3    losartan-hydrochlorothiazide (HYZAAR) 50-12.5 MG per tablet Take 1 Tablet by mouth every day. 100 Tablet 3    cyanocobalamin (VITAMIN B-12) 100 MCG Tab Take 100 mcg by mouth every day.      VITAMIN D PO Take 1 Tablet by mouth every day.       No current facility-administered medications for this visit.     She  has a past medical history of Allergy, Basal cell carcinoma (2004), Cervical polyp, Chronic bilateral low back pain without sciatica (2/16/2022), Colon polyps, DDD (degenerative disc disease), lumbar (2/16/2022), Episode of recurrent major depressive disorder (HCC) (1/25/2021), Generalized anxiety disorder (1/25/2021), GERD without esophagitis, Hyperlipidemia, Hypertension, essential, Impaired fasting glucose, Internal hemorrhoids, Kidney stones, Primary osteoarthritis involving multiple joints (1/7/2021), and Vitamin d deficiency.    She has no past medical history of Breast cancer ([facility]) or Clotting disorder ([facility]).    Hemoglobin A1c:  Lab Results   Component Value Date/Time    HBA1C 5.9 (H) 07/29/2024 07:04 AM    HBA1C 6.1 (H) 04/14/2023 06:20 AM    HBA1C 5.9 (H) 09/09/2022 12:16 PM       Microalbumin/creatinine Ratio:  Lab Results   Component Value Date/Time    URCREAT 95.73 07/29/2024 0704    MICROALBUR <1.2 07/29/2024 0704    MALBCRT see below 07/29/2024 0704       Lipid Panel:  Lab Results   Component Value Date/Time    CHOLSTRLTOT 177 07/29/2024 0704    TRIGLYCERIDE 68 07/29/2024 0704    LDL 82 07/29/2024 0704    CHOLHDLRAT 3.6 11/25/2014 0818       Complete Metabolic Panel:  Lab Results   Component Value Date/Time    SODIUM 139 07/29/2024 07:04 AM    POTASSIUM 4.2 07/29/2024 07:04 AM    CHLORIDE 103 07/29/2024 07:04 AM    CO2 25  "07/29/2024 07:04 AM    ANION 11.0 07/29/2024 07:04 AM    GLUCOSE 108 (H) 07/29/2024 07:04 AM    BUN 16 07/29/2024 07:04 AM    CREATININE 0.62 07/29/2024 07:04 AM    CREATININE 0.68 11/21/2012 10:00 AM    ASTSGOT 21 07/29/2024 07:04 AM    ALTSGPT 22 07/29/2024 07:04 AM    TBILIRUBIN 0.4 07/29/2024 07:04 AM    ALBUMIN 4.1 07/29/2024 07:04 AM    TOTPROTEIN 6.6 07/29/2024 07:04 AM    GLOBULIN 2.5 07/29/2024 07:04 AM    AGRATIO 1.6 07/29/2024 07:04 AM         Vitamin D:    Lab Results   Component Value Date/Time    25HYDROXY 35 07/29/2024 0704       GFR:    Lab Results   Component Value Date/Time    GFRCKD 90 07/29/2024 0704           ROS   Review of Systems   Constitutional: Negative.  Negative for fever, chills, weight loss, malaise/fatigue and diaphoresis.   HENT: Negative.  Negative for hearing loss, ear pain, nosebleeds, congestion, sore throat, neck pain, tinnitus and ear discharge.    Respiratory: Negative.  Negative for cough, hemoptysis, sputum production, shortness of breath, wheezing and stridor.    Cardiovascular: Negative.  Negative for chest pain, palpitations, orthopnea, claudication, leg swelling and PND.   Gastrointestinal: denies nausea, vomiting, diarrhea, constipation, heartburn, melena or hematochezia.  Genitourinary: Denies dysuria, hematuria, urinary incontinence, frequency or urgency.    Musculoskeletal: Negative.  Negative for myalgias.   Neurological: Negative.  Negative for dizziness, tingling, tremors, weakness and headaches.   Psych:  Denies depression, anxiety or insomnia.  All other systems reviewed and are negative.         Objective:     /78 (BP Location: Left arm, Patient Position: Sitting, BP Cuff Size: Adult)   Pulse 60   Temp 36.8 °C (98.2 °F) (Temporal)   Ht 1.651 m (5' 5\")   Wt 66.3 kg (146 lb 1.6 oz)   SpO2 97%  Body mass index is 24.31 kg/m².   Physical Exam  Heart exhibits a murmur.  Physical Exam   Vitals reviewed.  Constitutional: oriented to person, place, and time. " appears well-developed and well-nourished. No distress.   Neck: No JVD present.  Cardiovascular: Normal rate, regular rhythm, normal heart sounds and intact distal pulses.  Exam reveals no gallop and no friction rub.  2/6 murmur heard.  No carotid bruits.   Pulmonary/Chest: Effort normal and breath sounds normal. No stridor. No respiratory distress. no wheezes or rales. exhibits no tenderness.   Musculoskeletal: Normal range of motion. exhibits no edema. osbaldo pedal pulses 2+.  Lymphadenopathy: no cervical or supraclavicular adenopathy.   Neurological: alert and oriented to person, place, and time. exhibits normal muscle tone. Coordination normal.   Skin: Skin is warm and dry. no diaphoresis.  Left great toenail infection cleared.  No reddness or swelling.   Psychiatric: normal mood and affect. behavior is normal.           Assessment and Plan:   The following treatment plan was discussed      Assessment & Plan  1. Bilateral knee pain.  She will continue doing exercises and physical therapy. No further treatment is needed at this time.    2. Low back pain.  No sciatica. She will continue doing home exercises and physical therapy. No further treatment is needed at this time.    3. Hyperlipidemia.  Her lipid panel is well controlled. She will continue a healthy diet and atorvastatin. She will continue healthy exercise.    4. Impaired fasting glucose.  Her A1c is improved with a healthy diet and regular exercise. She is not on medication. We will continue to monitor.    5. B12 deficiency.  Her B12 levels are now within normal limits. She is taking B12 over the counter.    6. Vitamin D deficiency.  Her vitamin D levels are down. She thinks she forgot to take two of the vitamin D and has just been taking one. She will increase to 2000 units daily.    7. Hypertension.  Her hypertension is stable and well controlled on losartan hydrochlorothiazide.    8. Foot infection.  She experienced a foot infection after a pedicure,  which was treated with cephalexin (Keflex). She will finish the course of antibiotics today. She is advised to avoid aggressive pedicures in the future.    9. Toenail fungus and recent nail bed infection/paronychia.  She has toenail fungus on her right big toe. Lamisil 250 mg daily was discussed as a treatment option, but due to potential liver effects, it is not recommended unless the condition is significantly bothersome. She will continue with regular pedicures, ensuring they are not too aggressive.  Toe nail bed infection cleared.    10. Forgetfulness/short term memory loss.  She reports increased forgetfulness, particularly with names. This is likely age-related cerebral atrophy. She is advised to monitor for more concerning symptoms such as getting lost or leaving pans on the stove. No medication is recommended at this time.    11. Health Maintenance.  She is due for a bone density scan, as her last one was in March 2022. She is also due for a mammogram, as her last one was in March 2023. She is recommended to get a tetanus, diphtheria, and whooping cough shot if she is around young children or works with michelle objects. Shingles vaccine was discussed but is not needed as she has had shingles before.    Follow-up  She will follow up in 6 months for lab review with a repeat A1c and vitamin D.      ORDERS:  1. Hyperlipidemia LDL goal <100      2. IFG (impaired fasting glucose)    - HEMOGLOBIN A1C; Future    3. Disorder of vitamin B12      4. Vitamin D deficiency    - VITAMIN D,25 HYDROXY (DEFICIENCY); Future    5. Hypertension, essential      6. Chronic pain of both knees      7. Chronic bilateral low back pain without sciatica      8. Encounter for screening mammogram for malignant neoplasm of breast    - MA-SCREENING MAMMO BILAT W/TOMOSYNTHESIS W/CAD; Future    9. Post menopausal problems    - DS-BONE DENSITY STUDY (DEXA); Future          Please note that this dictation was created using voice recognition  software. I have made every reasonable attempt to correct obvious errors, but I expect that there are errors of grammar and possibly content that I did not discover before finalizing the note.      Attestation      Verbal consent was acquired by the patient to use [employer] ambient listening note generation during this visit Yes

## 2024-08-26 ENCOUNTER — PATIENT MESSAGE (OUTPATIENT)
Dept: HEALTH INFORMATION MANAGEMENT | Facility: OTHER | Age: 79
End: 2024-08-26

## 2024-09-16 ENCOUNTER — OFFICE VISIT (OUTPATIENT)
Dept: MEDICAL GROUP | Facility: MEDICAL CENTER | Age: 79
End: 2024-09-16
Payer: MEDICARE

## 2024-09-16 VITALS
OXYGEN SATURATION: 96 % | HEIGHT: 65 IN | RESPIRATION RATE: 18 BRPM | WEIGHT: 146 LBS | HEART RATE: 76 BPM | TEMPERATURE: 98.7 F | BODY MASS INDEX: 24.32 KG/M2

## 2024-09-16 DIAGNOSIS — L60.0 INGROWN TOENAIL OF RIGHT FOOT WITH INFECTION: ICD-10-CM

## 2024-09-16 PROCEDURE — 99214 OFFICE O/P EST MOD 30 MIN: CPT | Performed by: NURSE PRACTITIONER

## 2024-09-16 ASSESSMENT — FIBROSIS 4 INDEX: FIB4 SCORE: 1.47

## 2024-09-16 NOTE — PROGRESS NOTES
Subjective:     History of Present Illness  The patient is an 78-year-old female seen in follow-up for foot pain.    She has been applying Silvagel, an antimicrobial wound gel, and Melaleuca oil to manage a fungal infection. Despite the redness subsiding, she continues to experience mild tenderness. She suspects that the infection may have been caused by digging into the area during a pedi, which resulted in bleeding.  She expresses a desire to trim her toenail herself. She had previously used Epsom salts for relief but has not done so recently. She reports no issues with her other foot.      Results  none    Current medicines (including changes today)  Current Outpatient Medications   Medication Sig Dispense Refill    cephALEXin (KEFLEX) 500 MG Cap Take 500 mg by mouth 4 times a day.      atorvastatin (LIPITOR) 10 MG Tab Take 1 Tablet by mouth every day. 100 Tablet 3    gabapentin (NEURONTIN) 100 MG Cap Take 2 Capsules by mouth at bedtime. 200 Capsule 3    losartan-hydrochlorothiazide (HYZAAR) 50-12.5 MG per tablet Take 1 Tablet by mouth every day. 100 Tablet 3    cyanocobalamin (VITAMIN B-12) 100 MCG Tab Take 100 mcg by mouth every day.      VITAMIN D PO Take 1 Tablet by mouth every day.       No current facility-administered medications for this visit.     She  has a past medical history of Allergy, Basal cell carcinoma (2004), Cervical polyp, Chronic bilateral low back pain without sciatica (2/16/2022), Colon polyps, DDD (degenerative disc disease), lumbar (2/16/2022), Episode of recurrent major depressive disorder (HCC) (1/25/2021), Generalized anxiety disorder (1/25/2021), GERD without esophagitis, Hyperlipidemia, Hypertension, essential, Impaired fasting glucose, Internal hemorrhoids, Kidney stones, Primary osteoarthritis involving multiple joints (1/7/2021), and Vitamin d deficiency.    She has no past medical history of Breast cancer ([facility]) or Clotting disorder ([facility]).        ROS   Review of Systems  "  Constitutional: Negative.  Negative for fever, chills, weight loss, malaise/fatigue and diaphoresis.   HENT: Negative.  Negative for hearing loss, ear pain, nosebleeds, congestion, sore throat, neck pain, tinnitus and ear discharge.    Respiratory: Negative.  Negative for cough, hemoptysis, sputum production, shortness of breath, wheezing and stridor.    Cardiovascular: Negative.  Negative for chest pain, palpitations, orthopnea, claudication, leg swelling and PND.   Gastrointestinal: denies nausea, vomiting, diarrhea, constipation, heartburn, melena or hematochezia.  Genitourinary: Denies dysuria, hematuria, urinary incontinence, frequency or urgency.    Musculoskeletal: Negative.  Negative for myalgias and back pain.   Neurological: Negative.  Negative for dizziness, tingling, tremors, weakness and headaches.   Psych:  Denies depression, anxiety or insomnia.  All other systems reviewed and are negative.         Objective:     Pulse 76   Temp 37.1 °C (98.7 °F)   Resp 18   Ht 1.651 m (5' 5\")   Wt 66.2 kg (146 lb)   SpO2 96%  Body mass index is 24.3 kg/m².   Physical Exam    Physical Exam   Vitals reviewed.  Constitutional: oriented to person, place, and time. appears well-developed and well-nourished. No distress.   Neck: No JVD present.  Cardiovascular: Normal rate, regular rhythm, normal heart sounds and intact distal pulses.  Exam reveals no gallop and no friction rub.  3/6 holosystolic murmur heard.  No carotid bruits.   Pulmonary/Chest: Effort normal and breath sounds normal. No stridor. No respiratory distress. no wheezes or rales. exhibits no tenderness.   Musculoskeletal: Normal range of motion. exhibits no edema. osbaldo pedal pulses 2+.  Lymphadenopathy: no cervical or supraclavicular adenopathy.   Neurological: alert and oriented to person, place, and time. exhibits normal muscle tone. Coordination normal.   Skin: Skin is warm and dry. no diaphoresis. Great toe with nail mildly thickened.  No erythema " or edema noted today  Psychiatric: normal mood and affect. behavior is normal.           Assessment and Plan:   The following treatment plan was discussed      Assessment & Plan  1. Foot pain.  The infection appears to have resolved, but there is still tenderness and discomfort, likely due to an ingrown toenail. A referral to a podiatrist will be made for further evaluation and potential removal of the ingrown nail. She has been advised to continue using Epsom salts to help reduce swelling. The use of melaleuca oil was discussed as a potential treatment for the suspected fungal infection, and she was informed that the podiatrist might prescribe antifungal medication, which could affect liver function.          ORDERS:  1. Ingrown toenail of right foot with infection    - Referral to Podiatry          Please note that this dictation was created using voice recognition software. I have made every reasonable attempt to correct obvious errors, but I expect that there are errors of grammar and possibly content that I did not discover before finalizing the note.      Attestation      Verbal consent was acquired by the patient to use [employer] ambient listening note generation during this visit Yes

## 2025-01-09 ENCOUNTER — APPOINTMENT (OUTPATIENT)
Dept: MEDICAL GROUP | Facility: MEDICAL CENTER | Age: 80
End: 2025-01-09
Payer: MEDICARE

## 2025-01-29 ENCOUNTER — OFFICE VISIT (OUTPATIENT)
Dept: MEDICAL GROUP | Facility: MEDICAL CENTER | Age: 80
End: 2025-01-29
Payer: MEDICARE

## 2025-01-29 VITALS
WEIGHT: 146 LBS | TEMPERATURE: 97.9 F | HEIGHT: 65 IN | RESPIRATION RATE: 16 BRPM | BODY MASS INDEX: 24.32 KG/M2 | DIASTOLIC BLOOD PRESSURE: 64 MMHG | HEART RATE: 67 BPM | OXYGEN SATURATION: 96 % | SYSTOLIC BLOOD PRESSURE: 118 MMHG

## 2025-01-29 DIAGNOSIS — G89.29 CHRONIC RIGHT-SIDED LOW BACK PAIN WITH RIGHT-SIDED SCIATICA: ICD-10-CM

## 2025-01-29 DIAGNOSIS — M54.41 CHRONIC RIGHT-SIDED LOW BACK PAIN WITH RIGHT-SIDED SCIATICA: ICD-10-CM

## 2025-01-29 DIAGNOSIS — R41.3 MEMORY LOSS OF UNKNOWN CAUSE: ICD-10-CM

## 2025-01-29 ASSESSMENT — PATIENT HEALTH QUESTIONNAIRE - PHQ9: CLINICAL INTERPRETATION OF PHQ2 SCORE: 0

## 2025-01-29 NOTE — PROGRESS NOTES
Subjective:     History of Present Illness  The patient is an 79-year-old female seen in follow-up for memory loss.    She reports a progressive decline in her memory over the past month, characterized by difficulty recalling names of familiar individuals. She has not previously been tested for syphilis. She is currently on a regimen of B12 supplements and has expressed interest in starting Prevagen. Additionally, she reports poor sleep quality, often waking up around 3 or 4 AM and experiencing intermittent awakenings every half hour. She suspects that her gabapentin medication may be contributing to her sleep disturbances.    She also mentions a recent fall on her knee, which has since healed. She has a history of back pain, which is currently not present. She underwent physical therapy for her back condition but has not continued it recently. She notes a decrease in her physical activity, particularly walking, due to her dog's declining health. She experiences pain in her groin area upon waking up in the morning. She continues to take gabapentin, which she finds beneficial.    MEDICATIONS  Current: B12, gabapentin      Results  - Laboratory Studies:    - B12 level: 1150 (last year)    - Testing:    - Memory test score: 29 out of 30      Current medicines (including changes today)  Current Outpatient Medications   Medication Sig Dispense Refill    cephALEXin (KEFLEX) 500 MG Cap Take 500 mg by mouth 4 times a day.      atorvastatin (LIPITOR) 10 MG Tab Take 1 Tablet by mouth every day. 100 Tablet 3    gabapentin (NEURONTIN) 100 MG Cap Take 2 Capsules by mouth at bedtime. 200 Capsule 3    losartan-hydrochlorothiazide (HYZAAR) 50-12.5 MG per tablet Take 1 Tablet by mouth every day. 100 Tablet 3    cyanocobalamin (VITAMIN B-12) 100 MCG Tab Take 100 mcg by mouth every day.      VITAMIN D PO Take 1 Tablet by mouth every day.       No current facility-administered medications for this visit.     Current Outpatient  Medications   Medication Sig Dispense Refill    cephALEXin (KEFLEX) 500 MG Cap Take 500 mg by mouth 4 times a day.      atorvastatin (LIPITOR) 10 MG Tab Take 1 Tablet by mouth every day. 100 Tablet 3    gabapentin (NEURONTIN) 100 MG Cap Take 2 Capsules by mouth at bedtime. 200 Capsule 3    losartan-hydrochlorothiazide (HYZAAR) 50-12.5 MG per tablet Take 1 Tablet by mouth every day. 100 Tablet 3    cyanocobalamin (VITAMIN B-12) 100 MCG Tab Take 100 mcg by mouth every day.      VITAMIN D PO Take 1 Tablet by mouth every day.       No current facility-administered medications for this visit.       She  has a past medical history of Allergy, Basal cell carcinoma (2004), Cervical polyp, Chronic bilateral low back pain without sciatica (2/16/2022), Colon polyps, DDD (degenerative disc disease), lumbar (2/16/2022), Episode of recurrent major depressive disorder (HCC) (1/25/2021), Generalized anxiety disorder (1/25/2021), GERD without esophagitis, Hyperlipidemia, Hypertension, essential, Impaired fasting glucose, Internal hemorrhoids, Kidney stones, Primary osteoarthritis involving multiple joints (1/7/2021), and Vitamin d deficiency.    She has no past medical history of Breast cancer ([facility]) or Clotting disorder ([facility]).    ROS   Review of Systems   Constitutional: Negative.  Negative for fever, chills, weight loss, malaise/fatigue and diaphoresis.   HENT: Negative.  Negative for hearing loss, ear pain, nosebleeds, congestion, sore throat, neck pain, tinnitus and ear discharge.    Respiratory: Negative.  Negative for cough, hemoptysis, sputum production, shortness of breath, wheezing and stridor.    Cardiovascular: Negative.  Negative for chest pain, palpitations, orthopnea, claudication, leg swelling and PND.   Gastrointestinal: denies nausea, vomiting, diarrhea, constipation, heartburn, melena or hematochezia.  Genitourinary: Denies dysuria, hematuria, urinary incontinence, frequency or urgency.    Musculoskeletal: Negative.  " Negative for myalgias.   Neurological: Negative.  Negative for dizziness, tingling, tremors, weakness and headaches.   Psych:  Denies depression, anxiety.  All other systems reviewed and are negative.       Objective:     /64   Pulse 67   Temp 36.6 °C (97.9 °F) (Temporal)   Resp 16   Ht 1.651 m (5' 5\")   Wt 66.2 kg (146 lb)   SpO2 96%  Body mass index is 24.3 kg/m².   Physical Exam  Lungs were auscultated.  Physical Exam   Vitals reviewed.  Constitutional: oriented to person, place, and time. appears well-developed and well-nourished. No distress.   Neck: No JVD present.  Cardiovascular: Normal rate, regular rhythm, normal heart sounds and intact distal pulses.  Exam reveals no gallop and no friction rub.  No murmur heard.  No carotid bruits.   Pulmonary/Chest: Effort normal and breath sounds normal. No stridor. No respiratory distress. no wheezes or rales. exhibits no tenderness.   Musculoskeletal: Normal range of motion. exhibits no edema. osbaldo pedal pulses 2+.  Lymphadenopathy: no cervical or supraclavicular adenopathy.   Neurological: alert and oriented to person, place, and time. exhibits normal muscle tone. Coordination normal.   Skin: Skin is warm and dry. no diaphoresis.   Psychiatric: normal mood and affect. behavior is normal.   MMSE in office today: 29/30    Assessment and Plan:   The following treatment plan was discussed    Assessment & Plan  1. Memory impairment.  Her memory loss is mild, potentially attributable to normal aging processes. She scored 29 out of 30 on the memory test. She is advised to maintain her social activities to stimulate cognitive function. A comprehensive lab workup will be conducted, including tests for B12, folate, thyroid function (TSH, T4, T3, TPO), and syphilis (RPR). She is permitted to commence over-the-counter Prevagen. A memory test will be administered during her next visit. If the lab results are within normal limits, an MRI of the brain will be " considered. If the MRI results are unremarkable, pharmacological treatment with Aricept and Namenda will be initiated, starting with a low dose and gradually increasing as tolerated.    2. Low back pain with right-sided radiation.  She presents with moderate to severe arthritis at multiple levels, which could be contributing to her back pain and radiating symptoms. A referral to physical therapy will be made for her to learn exercises that can be performed at home. She is advised to continue her gabapentin regimen, with the option to increase the dosage to 3 tablets daily if it does not induce excessive drowsiness. If there is no improvement in her condition, a referral to physiatry for injections will be considered.    Follow-up  The patient will follow up in 3 weeks.      ORDERS:  1. Chronic right-sided low back pain with right-sided sciatica    - Referral to Physical Therapy    2. Memory loss of unknown cause    - VITAMIN B12; Future  - FOLATE; Future  - RPR (SYPHILIS); Future  - TSH; Future  - FREE THYROXINE; Future  - T3 FREE; Future  - THYROID PEROXIDASE  (TPO) AB; Future    3. Primary insomnia          Please note that this dictation was created using voice recognition software. I have made every reasonable attempt to correct obvious errors, but I expect that there are errors of grammar and possibly content that I did not discover before finalizing the note.      Attestation      Verbal consent was acquired by the patient to use [employer] ambient listening note generation during this visit Yes

## 2025-02-14 ENCOUNTER — HOSPITAL ENCOUNTER (OUTPATIENT)
Dept: LAB | Facility: MEDICAL CENTER | Age: 80
End: 2025-02-14
Attending: NURSE PRACTITIONER
Payer: MEDICARE

## 2025-02-14 DIAGNOSIS — M11.20 CALCIUM PYROPHOSPHATE DEPOSITION DISEASE (CPPD): ICD-10-CM

## 2025-02-14 DIAGNOSIS — R41.3 MEMORY LOSS OF UNKNOWN CAUSE: ICD-10-CM

## 2025-02-14 DIAGNOSIS — R73.01 IFG (IMPAIRED FASTING GLUCOSE): ICD-10-CM

## 2025-02-14 DIAGNOSIS — E55.9 VITAMIN D DEFICIENCY: ICD-10-CM

## 2025-02-14 LAB
EST. AVERAGE GLUCOSE BLD GHB EST-MCNC: 146 MG/DL
HBA1C MFR BLD: 6.7 % (ref 4–5.6)

## 2025-02-14 PROCEDURE — 84439 ASSAY OF FREE THYROXINE: CPT

## 2025-02-14 PROCEDURE — 84443 ASSAY THYROID STIM HORMONE: CPT

## 2025-02-14 PROCEDURE — 86376 MICROSOMAL ANTIBODY EACH: CPT

## 2025-02-14 PROCEDURE — 86780 TREPONEMA PALLIDUM: CPT

## 2025-02-14 PROCEDURE — 82746 ASSAY OF FOLIC ACID SERUM: CPT

## 2025-02-14 PROCEDURE — 84481 FREE ASSAY (FT-3): CPT

## 2025-02-14 PROCEDURE — 83036 HEMOGLOBIN GLYCOSYLATED A1C: CPT

## 2025-02-14 PROCEDURE — 84550 ASSAY OF BLOOD/URIC ACID: CPT

## 2025-02-14 PROCEDURE — 82306 VITAMIN D 25 HYDROXY: CPT

## 2025-02-14 PROCEDURE — 82607 VITAMIN B-12: CPT

## 2025-02-14 PROCEDURE — 36415 COLL VENOUS BLD VENIPUNCTURE: CPT

## 2025-02-15 LAB
25(OH)D3 SERPL-MCNC: 27 NG/ML (ref 30–100)
FOLATE SERPL-MCNC: 13.2 NG/ML
T PALLIDUM AB SER QL IA: NORMAL
T3FREE SERPL-MCNC: 2.92 PG/ML (ref 2–4.4)
T4 FREE SERPL-MCNC: 1.03 NG/DL (ref 0.93–1.7)
THYROPEROXIDASE AB SERPL-ACNC: >600 IU/ML (ref 0–9)
TSH SERPL-ACNC: 2.11 UIU/ML (ref 0.35–5.5)
URATE SERPL-MCNC: 4.8 MG/DL (ref 1.9–8.2)
VIT B12 SERPL-MCNC: 1127 PG/ML (ref 211–911)

## 2025-02-18 DIAGNOSIS — N95.9 POST MENOPAUSAL PROBLEMS: ICD-10-CM

## 2025-02-21 ENCOUNTER — RESULTS FOLLOW-UP (OUTPATIENT)
Dept: MEDICAL GROUP | Facility: MEDICAL CENTER | Age: 80
End: 2025-02-21

## 2025-02-23 DIAGNOSIS — I10 HYPERTENSION, ESSENTIAL: ICD-10-CM

## 2025-02-24 RX ORDER — LOSARTAN POTASSIUM AND HYDROCHLOROTHIAZIDE 12.5; 5 MG/1; MG/1
1 TABLET ORAL
Qty: 100 TABLET | Refills: 3 | Status: SHIPPED | OUTPATIENT
Start: 2025-02-24

## 2025-03-25 ENCOUNTER — OFFICE VISIT (OUTPATIENT)
Dept: MEDICAL GROUP | Facility: MEDICAL CENTER | Age: 80
End: 2025-03-25
Payer: MEDICARE

## 2025-03-25 VITALS
OXYGEN SATURATION: 95 % | SYSTOLIC BLOOD PRESSURE: 112 MMHG | WEIGHT: 145.5 LBS | BODY MASS INDEX: 24.84 KG/M2 | DIASTOLIC BLOOD PRESSURE: 58 MMHG | HEART RATE: 65 BPM | HEIGHT: 64 IN | TEMPERATURE: 99 F

## 2025-03-25 DIAGNOSIS — N95.9 POST MENOPAUSAL PROBLEMS: ICD-10-CM

## 2025-03-25 DIAGNOSIS — R41.3 MEMORY LOSS: ICD-10-CM

## 2025-03-25 DIAGNOSIS — M51.369 DDD (DEGENERATIVE DISC DISEASE), LUMBAR: ICD-10-CM

## 2025-03-25 DIAGNOSIS — E55.9 VITAMIN D DEFICIENCY: ICD-10-CM

## 2025-03-25 DIAGNOSIS — M54.41 CHRONIC BILATERAL LOW BACK PAIN WITH RIGHT-SIDED SCIATICA: ICD-10-CM

## 2025-03-25 DIAGNOSIS — R10.31 RIGHT GROIN PAIN: ICD-10-CM

## 2025-03-25 DIAGNOSIS — R73.01 IFG (IMPAIRED FASTING GLUCOSE): ICD-10-CM

## 2025-03-25 DIAGNOSIS — E06.9 THYROIDITIS: ICD-10-CM

## 2025-03-25 DIAGNOSIS — G89.29 CHRONIC BILATERAL LOW BACK PAIN WITH RIGHT-SIDED SCIATICA: ICD-10-CM

## 2025-03-25 PROCEDURE — 3074F SYST BP LT 130 MM HG: CPT | Performed by: NURSE PRACTITIONER

## 2025-03-25 PROCEDURE — 3078F DIAST BP <80 MM HG: CPT | Performed by: NURSE PRACTITIONER

## 2025-03-25 PROCEDURE — 99214 OFFICE O/P EST MOD 30 MIN: CPT | Performed by: NURSE PRACTITIONER

## 2025-03-25 RX ORDER — GABAPENTIN 100 MG/1
200 CAPSULE ORAL
Qty: 200 CAPSULE | Refills: 3 | Status: SHIPPED | OUTPATIENT
Start: 2025-03-25

## 2025-03-25 NOTE — PROGRESS NOTES
Subjective:     History of Present Illness  The patient is an 79-year-old female who presents for a follow-up visit memory loss.    She reports experiencing leg and back pain at night, which is managed with gabapentin. She has been attending physical therapy due to a knee injury sustained from a fall. She has been prescribed gabapentin to control her pain.  She has known DDD lumbar spine.  The pain radiates to her rt groin    She also mentions a previous episode of anal discomfort, characterized by itching and pain, which she initially suspected to be hemorrhoids. The condition has since resolved following the use of baby wipes and hydrocortisone. She also experienced constipation, which she attributed to the potential presence of hemorrhoids. She has resumed her regimen of fiber and probiotic gummies.    She has expressed concerns about memory loss, specifically forgetting names, and is considering the use of Prevagen.    She admits to a high sugar intake and lack of exercise.    FAMILY HISTORY  The patient mentions that diabetes runs in her family.    MEDICATIONS  Current: vitamin D, atorvastatin, B12, gabapentin, losartan hydrochlorothiazide  Discontinued: cephalexin    Results  - Laboratory Studies:    - TPO: greater than 600    - T3: normal    - T4: normal    - TSH: normal    - Folate: normal    - B12: normal    - Vitamin D: low    - A1c: 6.7    - Uric acid: normal    Current medicines (including changes today)  Current Outpatient Medications   Medication Sig Dispense Refill    gabapentin (NEURONTIN) 100 MG Cap Take 2 Capsules by mouth at bedtime. 200 Capsule 3    losartan-hydrochlorothiazide (HYZAAR) 50-12.5 MG per tablet TAKE ONE TABLET BY MOUTH EVERY  Tablet 3    atorvastatin (LIPITOR) 10 MG Tab Take 1 Tablet by mouth every day. 100 Tablet 3    cyanocobalamin (VITAMIN B-12) 100 MCG Tab Take 100 mcg by mouth every day.      VITAMIN D PO Take 1 Tablet by mouth every day.       No current  facility-administered medications for this visit.     Current Outpatient Medications   Medication Sig Dispense Refill    gabapentin (NEURONTIN) 100 MG Cap Take 2 Capsules by mouth at bedtime. 200 Capsule 3    losartan-hydrochlorothiazide (HYZAAR) 50-12.5 MG per tablet TAKE ONE TABLET BY MOUTH EVERY  Tablet 3    atorvastatin (LIPITOR) 10 MG Tab Take 1 Tablet by mouth every day. 100 Tablet 3    cyanocobalamin (VITAMIN B-12) 100 MCG Tab Take 100 mcg by mouth every day.      VITAMIN D PO Take 1 Tablet by mouth every day.       No current facility-administered medications for this visit.       She  has a past medical history of Allergy, Basal cell carcinoma (2004), Cervical polyp, Chronic bilateral low back pain without sciatica (2/16/2022), Colon polyps, DDD (degenerative disc disease), lumbar (2/16/2022), Episode of recurrent major depressive disorder (HCC) (1/25/2021), Generalized anxiety disorder (1/25/2021), GERD without esophagitis, Hyperlipidemia, Hypertension, essential, Impaired fasting glucose, Internal hemorrhoids, Kidney stones, Primary osteoarthritis involving multiple joints (1/7/2021), and Vitamin d deficiency.    She has no past medical history of Breast cancer ([facility]) or Clotting disorder ([facility]).    Hemoglobin A1c:  Lab Results   Component Value Date/Time    HBA1C 6.7 (H) 02/14/2025 12:44 PM    HBA1C 5.9 (H) 07/29/2024 07:04 AM    HBA1C 6.1 (H) 04/14/2023 06:20 AM         Thyroid:  Lab Results   Component Value Date/Time    TSHULTRASEN 2.110 02/14/2025 1244     Lab Results   Component Value Date/Time    FREET4 1.03 02/14/2025 1244         Vitamin D:    Lab Results   Component Value Date/Time    25HYDROXY 27 (L) 02/14/2025 1244       ROS   Review of Systems   Constitutional: Negative.  Negative for fever, chills, weight loss, malaise/fatigue and diaphoresis.   HENT: Negative.  Negative for hearing loss, ear pain, nosebleeds, congestion, sore throat, neck pain, tinnitus and ear discharge.   "  Respiratory: Negative.  Negative for cough, hemoptysis, sputum production, shortness of breath, wheezing and stridor.    Cardiovascular: Negative.  Negative for chest pain, palpitations, orthopnea, claudication, leg swelling and PND.   Gastrointestinal: denies nausea, vomiting, diarrhea, constipation, heartburn, melena or hematochezia.  Genitourinary: Denies dysuria, hematuria, urinary incontinence, frequency or urgency.    Musculoskeletal: Negative.  Negative for myalgias.   Neurological: Negative.  Negative for dizziness, tingling, tremors, weakness and headaches.   Psych:  Denies depression, anxiety or insomnia.  All other systems reviewed and are negative.       Objective:     /58   Pulse 65   Temp 37.2 °C (99 °F) (Temporal)   Ht 1.626 m (5' 4\")   Wt 66 kg (145 lb 8.1 oz)   SpO2 95%  Body mass index is 24.98 kg/m².   Physical Exam  Lungs were auscultated.  Heart was examined.  Physical Exam   Vitals reviewed.  Constitutional: oriented to person, place, and time. appears well-developed and well-nourished. No distress.   Neck: No JVD present.  Cardiovascular: Normal rate, regular rhythm, normal heart sounds and intact distal pulses.  Exam reveals no gallop and no friction rub.  No murmur heard.  No carotid bruits.   Pulmonary/Chest: Effort normal and breath sounds normal. No stridor. No respiratory distress. no wheezes or rales. exhibits no tenderness.   Musculoskeletal: Normal range of motion. exhibits no edema. osbaldo pedal pulses 2+.  Lymphadenopathy: no cervical or supraclavicular adenopathy.   Neurological: alert and oriented to person, place, and time. exhibits normal muscle tone. Coordination normal.   Skin: Skin is warm and dry. no diaphoresis.   Psychiatric: normal mood and affect. behavior is normal.       Assessment and Plan:   The following treatment plan was discussed    Assessment & Plan  1. Pain management for LBP and rt groin pain.  She reports that gabapentin helps manage her pain at " night but causes drowsiness. She is advised to continue gabapentin if it controls her pain without causing falls. A prescription for gabapentin has been refilled and [facility] to [facility].    2. Anal irritation.  She experienced itching and pain in the anal area, which resolved with the use of baby wipes and hydrocortisone. She also had difficulty with bowel movements, which improved after using Senokot. She is advised to maintain hydration and allow her body time to adjust. If symptoms recur, further evaluation will be necessary.    3. Memory loss.  Her laboratory results do not indicate any specific cause for her memory loss. She is reassured that this could be a natural part of aging. She is advised to consider using Prevagen. If her memory loss becomes a significant concern, potential treatments with Aricept and Namenda may be considered.    4. Thyroiditis.  Her TPO levels are elevated, indicating thyroiditis, but her T3, T4, and TSH levels are within normal ranges. A neck ultrasound will be ordered to check for nodules. If nodules larger than 1 cm are found, a biopsy may be considered.    5. Impaired fasting glucose.  Her A1c levels have increased to 6.7, nearing the threshold for diabetes medication initiation. She is advised to reduce sugar intake and increase physical activity. An A1c test will be repeated in 4 months.    6. Health maintenance.  She is due for a tetanus and diphtheria vaccine.   A bone density test will be ordered as she is overdue for one.  Enc pt to take her otc vitamin d supplement of D3 2000 units daily.    Follow-up  The patient will follow up in 4 months.      ORDERS:  1. Right groin pain    - gabapentin (NEURONTIN) 100 MG Cap; Take 2 Capsules by mouth at bedtime.  Dispense: 200 Capsule; Refill: 3    2. Chronic bilateral low back pain with right-sided sciatica    - gabapentin (NEURONTIN) 100 MG Cap; Take 2 Capsules by mouth at bedtime.  Dispense: 200 Capsule; Refill: 3    3.  DDD (degenerative disc disease), lumbar    - gabapentin (NEURONTIN) 100 MG Cap; Take 2 Capsules by mouth at bedtime.  Dispense: 200 Capsule; Refill: 3    4. Post menopausal problems    - DS-BONE DENSITY STUDY (DEXA); Future    5. Thyroiditis    - US-THYROID; Future    6. IFG (impaired fasting glucose)    - HEMOGLOBIN A1C; Future    7. Memory loss    Please note that this dictation was created using voice recognition software. I have made every reasonable attempt to correct obvious errors, but I expect that there are errors of grammar and possibly content that I did not discover before finalizing the note.      Attestation      Verbal consent was acquired by the patient to use [employer] ambient listening note generation during this visit Yes

## 2025-03-28 ENCOUNTER — APPOINTMENT (OUTPATIENT)
Dept: RADIOLOGY | Facility: MEDICAL CENTER | Age: 80
End: 2025-03-28
Attending: NURSE PRACTITIONER
Payer: MEDICARE

## 2025-03-28 DIAGNOSIS — E06.9 THYROIDITIS: ICD-10-CM

## 2025-03-28 PROCEDURE — 76536 US EXAM OF HEAD AND NECK: CPT

## 2025-03-30 ENCOUNTER — RESULTS FOLLOW-UP (OUTPATIENT)
Dept: MEDICAL GROUP | Facility: MEDICAL CENTER | Age: 80
End: 2025-03-30

## 2025-04-01 ENCOUNTER — APPOINTMENT (OUTPATIENT)
Dept: MEDICAL GROUP | Facility: MEDICAL CENTER | Age: 80
End: 2025-04-01
Payer: MEDICARE

## 2025-04-02 ENCOUNTER — OFFICE VISIT (OUTPATIENT)
Dept: MEDICAL GROUP | Facility: MEDICAL CENTER | Age: 80
End: 2025-04-02
Payer: MEDICARE

## 2025-04-02 VITALS
SYSTOLIC BLOOD PRESSURE: 134 MMHG | HEART RATE: 70 BPM | DIASTOLIC BLOOD PRESSURE: 52 MMHG | HEIGHT: 62 IN | OXYGEN SATURATION: 96 % | BODY MASS INDEX: 26.13 KG/M2 | TEMPERATURE: 98.3 F | WEIGHT: 142 LBS

## 2025-04-02 DIAGNOSIS — R76.8 ANTI-TPO ANTIBODIES PRESENT: ICD-10-CM

## 2025-04-02 DIAGNOSIS — E04.2 MULTIPLE THYROID NODULES: ICD-10-CM

## 2025-04-02 PROCEDURE — 99214 OFFICE O/P EST MOD 30 MIN: CPT | Performed by: PHYSICIAN ASSISTANT

## 2025-04-02 PROCEDURE — 3078F DIAST BP <80 MM HG: CPT | Performed by: PHYSICIAN ASSISTANT

## 2025-04-02 PROCEDURE — 3075F SYST BP GE 130 - 139MM HG: CPT | Performed by: PHYSICIAN ASSISTANT

## 2025-04-02 NOTE — PROGRESS NOTES
Subjective:     History of Present Illness  The patient is an 79-year-old female who presents for evaluation of elevated TPO.    She underwent a comprehensive blood workup in late February 2025, with the results being reviewed by Lora her PCP last week. The primary concern identified was related to her thyroid function. An ultrasound was subsequently ordered and performed. She has no history of TPO level ordered. She reports a decrease in her appetite compared to her usual dietary habits. She has not received any recent vaccinations. She has a scheduled appointment with Lora on 04/22/2025.      Current medicines (including changes today)  Current Outpatient Medications   Medication Sig Dispense Refill    gabapentin (NEURONTIN) 100 MG Cap Take 2 Capsules by mouth at bedtime. 200 Capsule 3    losartan-hydrochlorothiazide (HYZAAR) 50-12.5 MG per tablet TAKE ONE TABLET BY MOUTH EVERY  Tablet 3    atorvastatin (LIPITOR) 10 MG Tab Take 1 Tablet by mouth every day. 100 Tablet 3    cyanocobalamin (VITAMIN B-12) 100 MCG Tab Take 100 mcg by mouth every day.      VITAMIN D PO Take 1 Tablet by mouth every day.       No current facility-administered medications for this visit.     She  has a past medical history of Allergy, Basal cell carcinoma (2004), Cervical polyp, Chronic bilateral low back pain without sciatica (2/16/2022), Colon polyps, DDD (degenerative disc disease), lumbar (2/16/2022), Episode of recurrent major depressive disorder (HCC) (1/25/2021), Generalized anxiety disorder (1/25/2021), GERD without esophagitis, Hyperlipidemia, Hypertension, essential, Impaired fasting glucose, Internal hemorrhoids, Kidney stones, Primary osteoarthritis involving multiple joints (1/7/2021), and Vitamin d deficiency.    She has no past medical history of Breast cancer ([facility]) or Clotting disorder ([facility]).    ROS   No chest pain, no shortness of breath, no abdominal pain  Positive ROS as per HPI.  All other systems reviewed  "and are negative.     Objective:     /52 (BP Location: Right arm, Patient Position: Sitting, BP Cuff Size: Adult)   Pulse 70   Temp 36.8 °C (98.3 °F) (Temporal)   Ht 1.583 m (5' 2.32\")   Wt 64.4 kg (142 lb)   SpO2 96%  Body mass index is 25.7 kg/m².   Physical Exam    Constitutional: Alert, no distress.  Skin: Warm, dry, good turgor, no rashes in visible areas.  Eye: Equal, round and reactive, conjunctiva clear, lids normal.  ENMT: Lips without lesions, good dentition, oropharynx clear.  Neck: Trachea midline, no masses, no thyromegaly.  Psych: Alert and oriented x3, normal affect and mood.      Results  Laboratory Studies  TSH was 2.11. Free T4 was 1.03. T3 free was 2.92. TPO antibodies were greater than 600.    Imaging  Ultrasound showed multiple thyroid nodules.        Assessment and Plan:   The following treatment plan was discussed    Assessment & Plan  1. Elevated Thyroid Peroxidase (TPO) levels.  New condition noted in chart.  Her TSH and free T4 levels are within normal range, indicating stable thyroid function. However, her TPO levels are significantly elevated at greater than 600, which could potentially lead to the development of Hashimoto's thyroiditis. The cause of the elevated TPO is unclear, but it may be due to age-related factors. Ultrasound showed multiple thyroid nodules. A referral to an endocrinologist will be initiated for further evaluation. She has been advised to undergo a thyroid ultrasound around this time next year. If she does not receive any communication from the endocrinology department within 5 days, she should contact the referral department.    2.  Multiple thyroid nodules  Reviewed recent ultrasound.  Advised that she must have surveillance ultrasounds performed next March and then in follow-up in 2, 3 and then 5 years.      ORDERS:  1. Anti-TPO antibodies present    - Referral to Endocrinology    2. Multiple thyroid nodules          Please note that this dictation was " created using voice recognition software. I have made every reasonable attempt to correct obvious errors, but I expect that there are errors of grammar and possibly content that I did not discover before finalizing the note.      Attestation      Verbal consent was acquired by the patient to use [employer] ambient listening note generation during this visit Yes

## 2025-04-22 ENCOUNTER — OFFICE VISIT (OUTPATIENT)
Dept: MEDICAL GROUP | Facility: MEDICAL CENTER | Age: 80
End: 2025-04-22
Payer: MEDICARE

## 2025-04-22 VITALS
HEIGHT: 64 IN | SYSTOLIC BLOOD PRESSURE: 128 MMHG | WEIGHT: 142.42 LBS | OXYGEN SATURATION: 97 % | DIASTOLIC BLOOD PRESSURE: 72 MMHG | TEMPERATURE: 98.1 F | HEART RATE: 65 BPM | BODY MASS INDEX: 24.31 KG/M2

## 2025-04-22 DIAGNOSIS — R76.8 ANTI-TPO ANTIBODIES PRESENT: ICD-10-CM

## 2025-04-22 DIAGNOSIS — E04.2 MULTIPLE THYROID NODULES: ICD-10-CM

## 2025-04-22 DIAGNOSIS — Z63.79 STRESS DUE TO ILLNESS OF FAMILY MEMBER: ICD-10-CM

## 2025-04-22 PROCEDURE — 99214 OFFICE O/P EST MOD 30 MIN: CPT | Performed by: NURSE PRACTITIONER

## 2025-04-22 PROCEDURE — 3078F DIAST BP <80 MM HG: CPT | Performed by: NURSE PRACTITIONER

## 2025-04-22 PROCEDURE — 3074F SYST BP LT 130 MM HG: CPT | Performed by: NURSE PRACTITIONER

## 2025-04-22 RX ORDER — HYDROXYZINE HYDROCHLORIDE 25 MG/1
25 TABLET, FILM COATED ORAL 3 TIMES DAILY PRN
Qty: 90 TABLET | Refills: 0 | Status: SHIPPED | OUTPATIENT
Start: 2025-04-22

## 2025-04-22 NOTE — PROGRESS NOTES
Subjective:     History of Present Illness  The patient is an 79-year-old female seen in follow-up for thyroid nodules.    The chief complaint is thyroid nodules. She has been referred to endocrinology, but remains uncertain about the status of this referral. Thyroid labs, including TSH, T4, and T3, are reported as normal, indicating no need for treatment at this time. However, a high TPO level suggests thyroiditis. A thyroid ultrasound revealed multiple nodules, with two nodules in the left part of the thyroid measuring over a centimeter. These nodules are moderately suspicious, but the radiologist did not recommend a biopsy. The patient expresses curiosity about the biopsy procedure and reports experiencing hair thinning.    Significant stress and upset are reported due to her granddaughter's alcoholism. The granddaughter, aged 19, is undergoing a supervised detoxification program and plans to return to school to complete her GED. The patient is one of her major support systems and is concerned about the impact on her own health. She is able to sleep at night because she takes gabapentin and can catch a nap during the day if needed.  Pt tearful in the office.  She needs counseling but cannot get into provider long term.    PAST SURGICAL HISTORY:  She reports needing dental work and has had previous dental implants.    Results  - Laboratory Studies:    - TSH: Normal    - T4: Normal    - T3: Normal    - TPO: High at >600    - Imaging:    - Thyroid ultrasound: 3/29/25  Nodules >= 1cm:   Nodule #1  Location:  Left  mid  Size:  1.30 x 1.13 x 1.19 cm  Composition:  Solid-2  Echogenicity:  Hypoechoic-2  Shape:  Wider than tall-0  Margins:  Smooth-0  Echogenic Foci:  None-0   ACR TIRADS points/category:  4 - TR4 - Moderately Suspicious  Nodule #2  Location:  Left  lower  Size:  0.80 x 0.85 x 0.77 cm  Composition:  Solid-2  Echogenicity:  Hypoechoic-2  Shape:  Wider than tall-0  Margins:  Smooth-0  Echogenic Foci:   None-0  ACR TIRADS points/category:  4 - TR4 - Moderately Suspicious  IMPRESSION:  TR4 nodules in the left thyroid gland, measuring up to 1.3 cm  ACR TI-RADS Recommendations  TR4 (1-1.4cm) - follow up ultrasound in 1,2,3 and 5 years    Current medicines (including changes today)  Current Outpatient Medications   Medication Sig Dispense Refill    hydrOXYzine HCl (ATARAX) 25 MG Tab Take 1 Tablet by mouth 3 times a day as needed for Itching. 90 Tablet 0    gabapentin (NEURONTIN) 100 MG Cap Take 2 Capsules by mouth at bedtime. 200 Capsule 3    losartan-hydrochlorothiazide (HYZAAR) 50-12.5 MG per tablet TAKE ONE TABLET BY MOUTH EVERY  Tablet 3    atorvastatin (LIPITOR) 10 MG Tab Take 1 Tablet by mouth every day. 100 Tablet 3    cyanocobalamin (VITAMIN B-12) 100 MCG Tab Take 100 mcg by mouth every day.      VITAMIN D PO Take 1 Tablet by mouth every day.       No current facility-administered medications for this visit.     Current Outpatient Medications   Medication Sig Dispense Refill    hydrOXYzine HCl (ATARAX) 25 MG Tab Take 1 Tablet by mouth 3 times a day as needed for Itching. 90 Tablet 0    gabapentin (NEURONTIN) 100 MG Cap Take 2 Capsules by mouth at bedtime. 200 Capsule 3    losartan-hydrochlorothiazide (HYZAAR) 50-12.5 MG per tablet TAKE ONE TABLET BY MOUTH EVERY  Tablet 3    atorvastatin (LIPITOR) 10 MG Tab Take 1 Tablet by mouth every day. 100 Tablet 3    cyanocobalamin (VITAMIN B-12) 100 MCG Tab Take 100 mcg by mouth every day.      VITAMIN D PO Take 1 Tablet by mouth every day.       No current facility-administered medications for this visit.       She  has a past medical history of Allergy, Basal cell carcinoma (2004), Cervical polyp, Chronic bilateral low back pain without sciatica (2/16/2022), Colon polyps, DDD (degenerative disc disease), lumbar (2/16/2022), Episode of recurrent major depressive disorder (HCC) (1/25/2021), Generalized anxiety disorder (1/25/2021), GERD without esophagitis,  "Hyperlipidemia, Hypertension, essential, Impaired fasting glucose, Internal hemorrhoids, Kidney stones, Primary osteoarthritis involving multiple joints (1/7/2021), and Vitamin d deficiency.    She has no past medical history of Breast cancer ([facility]) or Clotting disorder (HCC).      Thyroid:  Lab Results   Component Value Date/Time    TSHULTRASEN 2.110 02/14/2025 1244     Lab Results   Component Value Date/Time    FREET4 1.03 02/14/2025 1244       ROS   Review of Systems   Constitutional: Negative.  Negative for fever, chills, weight loss, malaise/fatigue and diaphoresis.   HENT: Negative.  Negative for hearing loss, ear pain, nosebleeds, congestion, sore throat, neck pain, tinnitus and ear discharge.    Respiratory: Negative.  Negative for cough, hemoptysis, sputum production, shortness of breath, wheezing and stridor.    Cardiovascular: Negative.  Negative for chest pain, palpitations, orthopnea, claudication, leg swelling and PND.   Gastrointestinal: denies nausea, vomiting, diarrhea, constipation, heartburn, melena or hematochezia.  Genitourinary: Denies dysuria, hematuria, urinary incontinence, frequency or urgency.    Musculoskeletal: Negative.  Negative for myalgias and back pain.   Neurological: Negative.  Negative for dizziness, tingling, tremors, weakness and headaches.   Psych:  Denies insomnia.  All other systems reviewed and are negative.       Objective:     /72   Pulse 65   Temp 36.7 °C (98.1 °F)   Ht 1.62 m (5' 3.78\")   Wt 64.6 kg (142 lb 6.7 oz)   SpO2 97%  Body mass index is 24.61 kg/m².   Physical Exam  Respiratory: Clear to auscultation, no wheezing, rales or rhonchi  Cardiovascular: Heart murmur noted  Physical Exam   Vitals reviewed.  Constitutional: oriented to person, place, and time. appears well-developed and well-nourished. No distress.   Neck: No JVD present.  Cardiovascular: Normal rate, regular rhythm, normal heart sounds and intact distal pulses.  Exam reveals no gallop and no " friction rub.  No murmur heard.  No carotid bruits.   Pulmonary/Chest: Effort normal and breath sounds normal. No stridor. No respiratory distress. no wheezes or rales. exhibits no tenderness.   Musculoskeletal: Normal range of motion. exhibits no edema. osbaldo pedal pulses 2+.  Lymphadenopathy: no cervical or supraclavicular adenopathy.   Neurological: alert and oriented to person, place, and time. exhibits normal muscle tone. Coordination normal.   Skin: Skin is warm and dry. no diaphoresis.   Psychiatric: normal mood and affect. behavior is normal.       Assessment and Plan:   The following treatment plan was discussed    Assessment & Plan  1. Thyroid nodules.  - TSH, T4, and T3 levels are within normal range, indicating no immediate need for thyroid medication.  - Elevated TPO levels suggest thyroiditis. Thyroid ultrasound revealed multiple nodules, with two measuring over a centimeter (1.3 x 1.13 x 1.19 cm and a smaller one).  - Despite their size, the radiologist did not recommend a biopsy due to their moderate suspicion level. Given the high TPO levels and presence of nodules, a referral to endocrinology has been made for further evaluation.  - The endocrinologist will determine whether a biopsy is necessary or if annual monitoring is sufficient.    2. Stress and anxiety.  - Reports significant stress and anxiety related to her granddaughter's situation.  - Hydroxyzine has been prescribed, which can be taken up to three times daily as needed.  - Informed that this medication may induce drowsiness, so caution while driving is advised. A prescription for 90 tablets has been sent to [facility]'s pharmacy.  - Follow-up in 1 month to assess the effectiveness of the medication and overall stress levels.      ORDERS:  1. Stress due to illness of family member    - hydrOXYzine HCl (ATARAX) 25 MG Tab; Take 1 Tablet by mouth 3 times a day as needed for Itching.  Dispense: 90 Tablet; Refill: 0    2. Anti-TPO antibodies  present      3. Multiple thyroid nodules          Please note that this dictation was created using voice recognition software. I have made every reasonable attempt to correct obvious errors, but I expect that there are errors of grammar and possibly content that I did not discover before finalizing the note.      Attestation      Verbal consent was acquired by the patient to use [employer] ambient listening note generation during this visit Yes

## 2025-05-17 DIAGNOSIS — E78.5 HYPERLIPIDEMIA WITH TARGET LDL LESS THAN 130: ICD-10-CM

## 2025-05-18 RX ORDER — ATORVASTATIN CALCIUM 10 MG/1
TABLET, FILM COATED ORAL
Qty: 100 TABLET | Refills: 3 | Status: SHIPPED | OUTPATIENT
Start: 2025-05-18

## 2025-05-21 ENCOUNTER — OFFICE VISIT (OUTPATIENT)
Dept: ENDOCRINOLOGY | Facility: MEDICAL CENTER | Age: 80
End: 2025-05-21
Payer: MEDICARE

## 2025-05-21 VITALS
DIASTOLIC BLOOD PRESSURE: 62 MMHG | HEIGHT: 65 IN | HEART RATE: 70 BPM | WEIGHT: 144.2 LBS | SYSTOLIC BLOOD PRESSURE: 134 MMHG | OXYGEN SATURATION: 98 % | BODY MASS INDEX: 24.03 KG/M2

## 2025-05-21 DIAGNOSIS — E06.3 HASHIMOTO'S DISEASE: Primary | ICD-10-CM

## 2025-05-21 DIAGNOSIS — E04.2 MULTIPLE THYROID NODULES: ICD-10-CM

## 2025-05-21 DIAGNOSIS — E55.9 VITAMIN D DEFICIENCY: ICD-10-CM

## 2025-05-21 PROCEDURE — 99213 OFFICE O/P EST LOW 20 MIN: CPT

## 2025-05-21 NOTE — PROGRESS NOTES
Chief Complaint: Consult requested by VIRAJ Del Cid for evaluation of Hypothyroidism    HPI:     Mandie Juan is a 79 y.o. female with history of Hashimoto's disease diagnosed on February 2025 and is here for initial evaluation.    History of RA  She is currently under stress due to her grand daughter     Hashimoto's  She reports the following symptoms: mind fog, fatigue which has been present for past few months which she contributes to her current situation with her grand daughter.     She denies weight gain, feeling cold and cold intolerance, constipation, swelling, losing hair, anxiousness, feeling excessive energy, tremulousness, palpitations, sweating, and weight loss.      She denies lumps or enlargement in the neck.    She denies a family history of thyroid disease       Latest Reference Range & Units 02/14/25 12:44   Microsomal -Tpo- Abs 0.0 - 9.0 IU/mL >600.0 (H)      Latest Reference Range & Units 02/14/25 12:44   TSH 0.350 - 5.500 uIU/mL 2.110   Free T-4 0.93 - 1.70 ng/dL 1.03   T3,Free 2.00 - 4.40 pg/mL 2.92     2. Thyroid Nodules  She denies shortness of breath, difficulty swaollowing, anterior neck pain or swelling   Denies radiation therapy to head or neck  US of thyroid on 3/28/25  Nodule #1  Location:  Left  mid  Size:  1.30 x 1.13 x 1.19 cm  Composition:  Solid-2  Echogenicity:  Hypoechoic-2  Shape:  Wider than tall-0  Margins:  Smooth-0  Echogenic Foci:  None-0     ACR TIRADS points/category:  4 - TR4 - Moderately Suspicious     Nodule #2  Location:  Left  lower  Size:  0.80 x 0.85 x 0.77 cm  Composition:  Solid-2  Echogenicity:  Hypoechoic-2  Shape:  Wider than tall-0  Margins:  Smooth-0  Echogenic Foci:  None-0     ACR TIRADS points/category:  4 - TR4 - Moderately Suspicious    Currently taking vitamin D 3 2000 IU daily   Latest Reference Range & Units 02/14/25 12:44   25-Hydroxy   Vitamin D 25 30 - 100 ng/mL 27 (L)     Patient's medications, allergies, and social histories  were reviewed and updated as appropriate.      ROS:     CONS:     No fever, no chills, no weight loss, no fatigue   EYES:      No diplopia, no blurry vision, no redness of eyes, no swelling of eyelids   ENT:    No hearing loss, No ear pain, No sore throat, no dysphagia, no neck swelling   CV:     No chest pain, no palpitations, no claudication, no orthopnea, no PND   PULM:    No SOB, no cough, no hemoptysis, no wheezing    GI:   No nausea, no vomiting, no diarrhea, no constipation, no bloody stools   :  Passing urine well, no dysuria, no hematuria   ENDO:   No polyuria, no polydipsia, no heat intolerance, no cold intolerance   NEURO: No headaches, no dizziness, no convulsions, no tremors   MUSC:  No joint swellings, no arthralgias, no myalgias, no weakness   SKIN:   No rash, no ulcers, no dry skin   PSYCH:   No depression, no anxiety, no difficulty sleeping       Past Medical History:  Patient Active Problem List    Diagnosis Date Noted    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/02/2025    Multiple thyroid nodules 04/02/2025    Chronic bilateral low back pain without sciatica 02/16/2022    DDD (degenerative disc disease), lumbar 02/16/2022    Generalized anxiety disorder 01/25/2021    Primary osteoarthritis involving multiple joints 01/07/2021    Hypertension, essential     GERD without esophagitis     Impaired fasting glucose     Internal hemorrhoids     Allergic rhinitis due to allergen 02/21/2014    Basal cell carcinoma     Vitamin D deficiency 11/03/2011    Hyperlipidemia with target LDL less than 130 11/03/2011    Cervical polyp     Kidney stones     Colon polyps        Past Surgical History:  Past Surgical History[1]     Allergies:  Compazine, Pcn [penicillins], and Sulfa drugs     Current Medications:  Current Medications[2]    Social History:  Social History     Socioeconomic History    Marital status:      Spouse name: Not on file    Number of children: Not on file    Years of education: Not on file     "Highest education level: Not on file   Occupational History    Not on file   Tobacco Use    Smoking status: Never    Smokeless tobacco: Never   Vaping Use    Vaping status: Never Used   Substance and Sexual Activity    Alcohol use: Yes     Alcohol/week: 2.0 oz     Types: 4 Glasses of wine per week    Drug use: No    Sexual activity: Not Currently     Partners: Male   Other Topics Concern    Not on file   Social History Narrative    Not on file     Social Drivers of Health     Financial Resource Strain: Not on file   Food Insecurity: Not on file   Transportation Needs: Not on file   Physical Activity: Not on file   Stress: Not on file   Social Connections: Not on file   Intimate Partner Violence: Not on file   Housing Stability: Not on file        Family History:   Family History   Problem Relation Age of Onset    Heart Disease Mother     Cancer Father     Hypertension Sister     Hypertension Brother     Cancer Paternal Grandfather          PHYSICAL EXAM:   Vital signs: /62 (BP Location: Left arm, Patient Position: Sitting, BP Cuff Size: Adult)   Pulse 70   Ht 1.651 m (5' 5\")   Wt 65.4 kg (144 lb 3.2 oz)   LMP 03/01/1993   SpO2 98%   BMI 24.00 kg/m²   GENERAL: Well-developed, well-nourished  in no apparent distress.   EYE: No ocular and eyelid asymmetry, Anicteric sclerae,  PERRL  HENT: Hearing grossly intact, Normocephalic, atraumatic. Pink, moist mucous membranes, No exudate  NECK: Supple. Trachea midline. thyroid is normal in size without nodules or tenderness  CARDIOVASCULAR: Regular rate and rhythm. No murmurs, rubs, or gallops.   LUNGS: Clear to auscultation bilaterally   ABDOMEN: Soft, nontender with positive bowel sounds.   EXTREMITIES: No clubbing, cyanosis, or edema.   NEUROLOGICAL: Cranial nerves II-XII are grossly intact   Symmetric reflexes at the patella no proximal muscle weakness  LYMPH: No cervical, supraclavicular,  adenopathy palpated.   SKIN: No rashes, lesions. Turgor is " "normal.    Labs:  Lab Results   Component Value Date/Time    WBC 4.7 (L) 10/01/2022 04:53 AM    WBC 4.3 05/07/2010 07:39 AM    RBC 4.59 10/01/2022 04:53 AM    RBC 4.32 05/07/2010 07:39 AM    HEMOGLOBIN 13.8 10/01/2022 04:53 AM    MCV 88.7 10/01/2022 04:53 AM    MCV 91 05/07/2010 07:39 AM    MCH 30.1 10/01/2022 04:53 AM    MCH 30.6 05/07/2010 07:39 AM    MCHC 33.9 10/01/2022 04:53 AM    RDW 40.1 10/01/2022 04:53 AM    RDW 13.3 05/07/2010 07:39 AM    MPV 9.6 10/01/2022 04:53 AM       Lab Results   Component Value Date/Time    SODIUM 139 07/29/2024 07:04 AM    POTASSIUM 4.2 07/29/2024 07:04 AM    CHLORIDE 103 07/29/2024 07:04 AM    CO2 25 07/29/2024 07:04 AM    ANION 11.0 07/29/2024 07:04 AM    GLUCOSE 108 (H) 07/29/2024 07:04 AM    BUN 16 07/29/2024 07:04 AM    CREATININE 0.62 07/29/2024 07:04 AM    CREATININE 0.68 11/21/2012 10:00 AM    CALCIUM 9.7 07/29/2024 07:04 AM    ASTSGOT 21 07/29/2024 07:04 AM    ALTSGPT 22 07/29/2024 07:04 AM    TBILIRUBIN 0.4 07/29/2024 07:04 AM    ALBUMIN 4.1 07/29/2024 07:04 AM    TOTPROTEIN 6.6 07/29/2024 07:04 AM    GLOBULIN 2.5 07/29/2024 07:04 AM    AGRATIO 1.6 07/29/2024 07:04 AM       Lab Results   Component Value Date/Time    CHOLSTRLTOT 177 07/29/2024 0704    TRIGLYCERIDE 68 07/29/2024 0704    HDL 81 07/29/2024 0704    LDL 82 07/29/2024 0704    CHOLHDLRAT 3.6 11/25/2014 0818       Lab Results   Component Value Date/Time    TSHULTRASEN 2.110 02/14/2025 1244     Lab Results   Component Value Date/Time    FREET4 1.03 02/14/2025 1244     Lab Results   Component Value Date/Time    FREET3 2.92 02/14/2025 1244     No results found for: \"THYSTIMIG\"    Lab Results   Component Value Date/Time    MICROSOMALA >600.0 (H) 02/14/2025 1244         Imaging:  3/28/2025 11:02 AM     HISTORY/REASON FOR EXAM:  Thyroiditis     TECHNIQUE/EXAM DESCRIPTION:  Ultrasound of the soft tissues of the head and neck.     COMPARISON:  None     FINDINGS:  The thyroid gland is heterogeneous.  Vascularity is " normal.     The right lobe of the thyroid gland measures 2.52 cm x 5.13 cm x 2.66 cm.  The left lobe of the thyroid gland measures 2.84 cm x 5.33 cm x 2.16 cm.  The isthmus measures 0.56 cm.     Nodules >= 1cm:        Nodule #1  Location:  Left  mid  Size:  1.30 x 1.13 x 1.19 cm  Composition:  Solid-2  Echogenicity:  Hypoechoic-2  Shape:  Wider than tall-0  Margins:  Smooth-0  Echogenic Foci:  None-0     ACR TIRADS points/category:  4 - TR4 - Moderately Suspicious     Nodule #2  Location:  Left  lower  Size:  0.80 x 0.85 x 0.77 cm  Composition:  Solid-2  Echogenicity:  Hypoechoic-2  Shape:  Wider than tall-0  Margins:  Smooth-0  Echogenic Foci:  None-0     ACR TIRADS points/category:  4 - TR4 - Moderately Suspicious           IMPRESSION:     TR4 nodules in the left thyroid gland, measuring up to 1.3 cm     ACR TI-RADS Recommendations  TR4 (1-1.4cm) - follow up ultrasound in 1,2,3 and 5 years     Recommendations based on the [employer] Thyroid imaging, reporting and Data System (TI-RADS) 2017.        INTERPRETING LOCATION: [address] [place] [address] [place], [address]    ASSESSMENT/PLAN:     1. Hashimoto's disease (Primary)  Stable  Discussed the etiology of hashimoto's with patient. Patient understands that hashimoto's is the cause of hypothyroidism. Patient's current thyroid levels are WNL. Normal thyroid levels do not warrant the need to start treatment.   She is asymptomatic at this time.   We will continue to monitor and repeat thyroid levels in one year.     2. Multiple thyroid nodules  Unstable  Reviewed the US of thyroid with patient which showed TR4 nodules in the left thyroid gland, measuring up to 1.3 cm  This nodule has never been biopsied. I will get a biopsy to rule out cancer.   Patient will schedule with Dr. Gruber for biopsy.   Discussed with patient should the biopsy results are indeterminate, the pathology will be sent to [facility] which is advanced DNA analysis.   - POC FNA BIOPSY US GUIDED  THYROID/PARATHYROID/LYMPH NODE; Future    3. Vitamin D deficiency  Unstable  Recommend vitamin D 3 4000 IU daily     Return in about 5 weeks (around 6/25/2025). Patient will have blood work done prior to follow up in 5 weeks.     I spent approximately 50 minutes with the patient face-to-face more than 50% of which time was spent on counseling on the diagnosis and treatment and review of her risks and prognosis.  I discussed current status, physical exam findings, and plan of care.  Answered all of her questions.  The patient understands and agrees with the treatment plan.     Thank you kindly for allowing me to participate in the thyroid care plan for this patient.    Reddy Nohemy, APRN   05/21/25    CC:   VIRAJ Del Cid         [1] No past surgical history on file.  [2]   Current Outpatient Medications:     atorvastatin (LIPITOR) 10 MG Tab, TAKE ONE TABLET BY MOUTH EVERY DAY GENERIC FOR LIPITOR, Disp: 100 Tablet, Rfl: 3    hydrOXYzine HCl (ATARAX) 25 MG Tab, Take 1 Tablet by mouth 3 times a day as needed for Itching., Disp: 90 Tablet, Rfl: 0    gabapentin (NEURONTIN) 100 MG Cap, Take 2 Capsules by mouth at bedtime., Disp: 200 Capsule, Rfl: 3    losartan-hydrochlorothiazide (HYZAAR) 50-12.5 MG per tablet, TAKE ONE TABLET BY MOUTH EVERY DAY, Disp: 100 Tablet, Rfl: 3    cyanocobalamin (VITAMIN B-12) 100 MCG Tab, Take 100 mcg by mouth every day., Disp: , Rfl:     VITAMIN D PO, Take 1 Tablet by mouth every day., Disp: , Rfl:

## 2025-06-23 ENCOUNTER — HOSPITAL ENCOUNTER (OUTPATIENT)
Facility: MEDICAL CENTER | Age: 80
End: 2025-06-23
Attending: INTERNAL MEDICINE
Payer: MEDICARE

## 2025-06-23 ENCOUNTER — PROCEDURE VISIT (OUTPATIENT)
Dept: ENDOCRINOLOGY | Facility: MEDICAL CENTER | Age: 80
End: 2025-06-23
Payer: MEDICARE

## 2025-06-23 DIAGNOSIS — E04.2 MULTIPLE THYROID NODULES: Primary | ICD-10-CM

## 2025-06-23 LAB — PATHOLOGY CONSULT NOTE: NORMAL

## 2025-06-23 PROCEDURE — 10005 FNA BX W/US GDN 1ST LES: CPT | Performed by: INTERNAL MEDICINE

## 2025-06-23 PROCEDURE — 88173 CYTOPATH EVAL FNA REPORT: CPT | Mod: 26 | Performed by: PATHOLOGY

## 2025-06-23 PROCEDURE — 88173 CYTOPATH EVAL FNA REPORT: CPT | Performed by: PATHOLOGY

## 2025-06-23 NOTE — PROCEDURES
EXAMINATION:  6/23/2025 4:05 PM    MRN  [id number]       HISTORY/REASON FOR EXAM:  USGFNAB 1.3 cm solid nodule LML            TECHNIQUE/EXAM DESCRIPTION:  LEFT thyroid fine-needle aspiration with ultrasound guidance.    COMPARISON: Ultrasound 2025 which showed a 1.3 cm solid nodule LML     PROCEDURE:  The risks, benefits, goals and objectives and alternatives were discussed. Risks were specified as including but not limited to bleeding, infection, damage to vessels or nerves, pain and discomfort as well as nondiagnosis. The patient's questions were answered. Informed oral and written consent were obtained.    Localizing ultrasound images were obtained with the patient in supine position. The target lesion was identified and deemed suitable for ultrasound guided biopsy.     The skin was prepped and draped in the usual sterile manner. A timeout was performed.    Local anesthetic result was achieved with administration of 1% lidocaine. Under real-time ultrasound guidance, a total of 4 fine needle aspiration (FNA) biopsies were obtained with 25 G needles.    [facility] was obtained     Specimens were prepared in the usual fashion.    The patient tolerated the procedure well with no evidence of complication.  The skin was cleaned and a dressing was applied.    FINDINGS:  The localizing ultrasound demonstrates  a 1.3 cm solid nodule LML .    IMPRESSION:  Ultrasound-guided LEFT thyroid fine needle aspiration.

## 2025-06-23 NOTE — PROGRESS NOTES
POC US guided FNA procedure was completed today for the 1.3 cm solid nodule LML   Please see endocrinologist procedure note  Patient tolerated procedure well without complaints.  Patient was advised that she will be contacted regarding the results and next plan  Patient was advised to follow up as planned with labs prior   Patient was advised to take tylenol or ibuprofen for pain  No restrictions were advised post procedure   Patient was advised to call for any issues post biopsy       Serge Gruber M.D.

## 2025-06-30 ENCOUNTER — APPOINTMENT (OUTPATIENT)
Dept: ENDOCRINOLOGY | Facility: MEDICAL CENTER | Age: 80
End: 2025-06-30
Payer: MEDICARE

## 2025-06-30 ENCOUNTER — TELEPHONE (OUTPATIENT)
Dept: ENDOCRINOLOGY | Facility: MEDICAL CENTER | Age: 80
End: 2025-06-30
Payer: MEDICARE

## 2025-06-30 NOTE — TELEPHONE ENCOUNTER
Called patient to notify her that we will need to reschedule today's visit due to Test results not being resulted yet she stated she would call us back when she was home

## 2025-06-30 NOTE — PROGRESS NOTES
Chief Complaint: Consult requested by VIRAJ Del Cid for evaluation of Hypothyroidism    HPI:     Mandie Juan is a 79 y.o. female with history of Hashimoto's disease diagnosed on February 2025 and is here for initial evaluation.    History of RA  She is currently under stress due to her grand daughter     Hashimoto's  She reports the following symptoms: mind fog, fatigue which has been present for past few months which she contributes to her current situation with her grand daughter.     She denies weight gain, feeling cold and cold intolerance, constipation, swelling, losing hair, anxiousness, feeling excessive energy, tremulousness, palpitations, sweating, and weight loss.      She denies lumps or enlargement in the neck.    She denies a family history of thyroid disease       Latest Reference Range & Units 02/14/25 12:44   Microsomal -Tpo- Abs 0.0 - 9.0 IU/mL >600.0 (H)      Latest Reference Range & Units 02/14/25 12:44   TSH 0.350 - 5.500 uIU/mL 2.110   Free T-4 0.93 - 1.70 ng/dL 1.03   T3,Free 2.00 - 4.40 pg/mL 2.92     2. Thyroid Nodules  She denies shortness of breath, difficulty swaollowing, anterior neck pain or swelling   Denies radiation therapy to head or neck  US of thyroid on 3/28/25  Nodule #1  Location:  Left  mid  Size:  1.30 x 1.13 x 1.19 cm  Composition:  Solid-2  Echogenicity:  Hypoechoic-2  Shape:  Wider than tall-0  Margins:  Smooth-0  Echogenic Foci:  None-0     ACR TIRADS points/category:  4 - TR4 - Moderately Suspicious     Nodule #2  Location:  Left  lower  Size:  0.80 x 0.85 x 0.77 cm  Composition:  Solid-2  Echogenicity:  Hypoechoic-2  Shape:  Wider than tall-0  Margins:  Smooth-0  Echogenic Foci:  None-0     ACR TIRADS points/category:  4 - TR4 - Moderately Suspicious    Currently taking vitamin D 3 2000 IU daily   Latest Reference Range & Units 02/14/25 12:44   25-Hydroxy   Vitamin D 25 30 - 100 ng/mL 27 (L)     Patient's medications, allergies, and social histories  were reviewed and updated as appropriate.      ROS:     CONS:     No fever, no chills, no weight loss, no fatigue   EYES:      No diplopia, no blurry vision, no redness of eyes, no swelling of eyelids   ENT:    No hearing loss, No ear pain, No sore throat, no dysphagia, no neck swelling   CV:     No chest pain, no palpitations, no claudication, no orthopnea, no PND   PULM:    No SOB, no cough, no hemoptysis, no wheezing    GI:   No nausea, no vomiting, no diarrhea, no constipation, no bloody stools   :  Passing urine well, no dysuria, no hematuria   ENDO:   No polyuria, no polydipsia, no heat intolerance, no cold intolerance   NEURO: No headaches, no dizziness, no convulsions, no tremors   MUSC:  No joint swellings, no arthralgias, no myalgias, no weakness   SKIN:   No rash, no ulcers, no dry skin   PSYCH:   No depression, no anxiety, no difficulty sleeping       Past Medical History:  Patient Active Problem List    Diagnosis Date Noted    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/02/2025    Multiple thyroid nodules 04/02/2025    Chronic bilateral low back pain without sciatica 02/16/2022    DDD (degenerative disc disease), lumbar 02/16/2022    Generalized anxiety disorder 01/25/2021    Primary osteoarthritis involving multiple joints 01/07/2021    Hypertension, essential     GERD without esophagitis     Impaired fasting glucose     Internal hemorrhoids     Allergic rhinitis due to allergen 02/21/2014    Basal cell carcinoma     Vitamin D deficiency 11/03/2011    Hyperlipidemia with target LDL less than 130 11/03/2011    Cervical polyp     Kidney stones     Colon polyps        Past Surgical History:  Past Surgical History[1]     Allergies:  Compazine, Pcn [penicillins], and Sulfa drugs     Current Medications:  Current Medications[2]    Social History:  Social History     Socioeconomic History    Marital status:      Spouse name: Not on file    Number of children: Not on file    Years of education: Not on file     Highest education level: Not on file   Occupational History    Not on file   Tobacco Use    Smoking status: Never    Smokeless tobacco: Never   Vaping Use    Vaping status: Never Used   Substance and Sexual Activity    Alcohol use: Yes     Alcohol/week: 2.0 oz     Types: 4 Glasses of wine per week    Drug use: No    Sexual activity: Not Currently     Partners: Male   Other Topics Concern    Not on file   Social History Narrative    Not on file     Social Drivers of Health     Financial Resource Strain: Not on file   Food Insecurity: Not on file   Transportation Needs: Not on file   Physical Activity: Not on file   Stress: Not on file   Social Connections: Not on file   Intimate Partner Violence: Not on file   Housing Stability: Not on file        Family History:   Family History   Problem Relation Age of Onset    Heart Disease Mother     Cancer Father     Hypertension Sister     Hypertension Brother     Cancer Paternal Grandfather          PHYSICAL EXAM:   Vital signs: LMP 03/01/1993   GENERAL: Well-developed, well-nourished  in no apparent distress.   EYE: No ocular and eyelid asymmetry, Anicteric sclerae,  PERRL  HENT: Hearing grossly intact, Normocephalic, atraumatic. Pink, moist mucous membranes, No exudate  NECK: Supple. Trachea midline. thyroid is normal in size without nodules or tenderness  CARDIOVASCULAR: Regular rate and rhythm. No murmurs, rubs, or gallops.   LUNGS: Clear to auscultation bilaterally   ABDOMEN: Soft, nontender with positive bowel sounds.   EXTREMITIES: No clubbing, cyanosis, or edema.   NEUROLOGICAL: Cranial nerves II-XII are grossly intact   Symmetric reflexes at the patella no proximal muscle weakness  LYMPH: No cervical, supraclavicular,  adenopathy palpated.   SKIN: No rashes, lesions. Turgor is normal.    Labs:  Lab Results   Component Value Date/Time    WBC 4.7 (L) 10/01/2022 04:53 AM    WBC 4.3 05/07/2010 07:39 AM    RBC 4.59 10/01/2022 04:53 AM    RBC 4.32 05/07/2010 07:39 AM     "HEMOGLOBIN 13.8 10/01/2022 04:53 AM    MCV 88.7 10/01/2022 04:53 AM    MCV 91 05/07/2010 07:39 AM    MCH 30.1 10/01/2022 04:53 AM    MCH 30.6 05/07/2010 07:39 AM    MCHC 33.9 10/01/2022 04:53 AM    RDW 40.1 10/01/2022 04:53 AM    RDW 13.3 05/07/2010 07:39 AM    MPV 9.6 10/01/2022 04:53 AM       Lab Results   Component Value Date/Time    SODIUM 139 07/29/2024 07:04 AM    POTASSIUM 4.2 07/29/2024 07:04 AM    CHLORIDE 103 07/29/2024 07:04 AM    CO2 25 07/29/2024 07:04 AM    ANION 11.0 07/29/2024 07:04 AM    GLUCOSE 108 (H) 07/29/2024 07:04 AM    BUN 16 07/29/2024 07:04 AM    CREATININE 0.62 07/29/2024 07:04 AM    CREATININE 0.68 11/21/2012 10:00 AM    CALCIUM 9.7 07/29/2024 07:04 AM    ASTSGOT 21 07/29/2024 07:04 AM    ALTSGPT 22 07/29/2024 07:04 AM    TBILIRUBIN 0.4 07/29/2024 07:04 AM    ALBUMIN 4.1 07/29/2024 07:04 AM    TOTPROTEIN 6.6 07/29/2024 07:04 AM    GLOBULIN 2.5 07/29/2024 07:04 AM    AGRATIO 1.6 07/29/2024 07:04 AM       Lab Results   Component Value Date/Time    CHOLSTRLTOT 177 07/29/2024 0704    TRIGLYCERIDE 68 07/29/2024 0704    HDL 81 07/29/2024 0704    LDL 82 07/29/2024 0704    CHOLHDLRAT 3.6 11/25/2014 0818       Lab Results   Component Value Date/Time    TSHULTRASEN 2.110 02/14/2025 1244     Lab Results   Component Value Date/Time    FREET4 1.03 02/14/2025 1244     Lab Results   Component Value Date/Time    FREET3 2.92 02/14/2025 1244     No results found for: \"THYSTIMIG\"    Lab Results   Component Value Date/Time    MICROSOMALA >600.0 (H) 02/14/2025 1244         Imaging:  3/28/2025 11:02 AM     HISTORY/REASON FOR EXAM:  Thyroiditis     TECHNIQUE/EXAM DESCRIPTION:  Ultrasound of the soft tissues of the head and neck.     COMPARISON:  None     FINDINGS:  The thyroid gland is heterogeneous.  Vascularity is normal.     The right lobe of the thyroid gland measures 2.52 cm x 5.13 cm x 2.66 cm.  The left lobe of the thyroid gland measures 2.84 cm x 5.33 cm x 2.16 cm.  The isthmus measures 0.56 cm.   "   Nodules >= 1cm:        Nodule #1  Location:  Left  mid  Size:  1.30 x 1.13 x 1.19 cm  Composition:  Solid-2  Echogenicity:  Hypoechoic-2  Shape:  Wider than tall-0  Margins:  Smooth-0  Echogenic Foci:  None-0     ACR TIRADS points/category:  4 - TR4 - Moderately Suspicious     Nodule #2  Location:  Left  lower  Size:  0.80 x 0.85 x 0.77 cm  Composition:  Solid-2  Echogenicity:  Hypoechoic-2  Shape:  Wider than tall-0  Margins:  Smooth-0  Echogenic Foci:  None-0     ACR TIRADS points/category:  4 - TR4 - Moderately Suspicious           IMPRESSION:     TR4 nodules in the left thyroid gland, measuring up to 1.3 cm     ACR TI-RADS Recommendations  TR4 (1-1.4cm) - follow up ultrasound in 1,2,3 and 5 years     Recommendations based on the American College of Radiology Thyroid imaging, reporting and Data System (TI-RADS) 2017.        INTERPRETING LOCATION: [address] [place] [address] [place], [id number]    ASSESSMENT/PLAN:     1. Hashimoto's disease (Primary)  Stable  Discussed the etiology of hashimoto's with patient. Patient understands that hashimoto's is the cause of hypothyroidism. Patient's current thyroid levels are WNL. Normal thyroid levels do not warrant the need to start treatment.   She is asymptomatic at this time.   We will continue to monitor and repeat thyroid levels in one year.     2. Multiple thyroid nodules  Unstable  Reviewed the US of thyroid with patient which showed TR4 nodules in the left thyroid gland, measuring up to 1.3 cm  This nodule has never been biopsied. I will get a biopsy to rule out cancer.   Patient will schedule with Dr. Gruber for biopsy.   Discussed with patient should the biopsy results are indeterminate, the pathology will be sent to [facility] which is advanced DNA analysis.   - POC FNA BIOPSY US GUIDED THYROID/PARATHYROID/LYMPH NODE; Future    3. Vitamin D deficiency  Unstable  Recommend vitamin D 3 4000 IU daily     No follow-ups on file. Patient will have blood work done prior to follow up in  5 weeks.     I spent approximately 50 minutes with the patient face-to-face more than 50% of which time was spent on counseling on the diagnosis and treatment and review of her risks and prognosis.  I discussed current status, physical exam findings, and plan of care.  Answered all of her questions.  The patient understands and agrees with the treatment plan.     Thank you kindly for allowing me to participate in the thyroid care plan for this patient.    Reddy Nohemy, APRN   05/21/25    CC:   VIRAJ Del Cid         [1] No past surgical history on file.  [2]   Current Outpatient Medications:     atorvastatin (LIPITOR) 10 MG Tab, TAKE ONE TABLET BY MOUTH EVERY DAY GENERIC FOR LIPITOR, Disp: 100 Tablet, Rfl: 3    hydrOXYzine HCl (ATARAX) 25 MG Tab, Take 1 Tablet by mouth 3 times a day as needed for Itching., Disp: 90 Tablet, Rfl: 0    gabapentin (NEURONTIN) 100 MG Cap, Take 2 Capsules by mouth at bedtime., Disp: 200 Capsule, Rfl: 3    losartan-hydrochlorothiazide (HYZAAR) 50-12.5 MG per tablet, TAKE ONE TABLET BY MOUTH EVERY DAY, Disp: 100 Tablet, Rfl: 3    cyanocobalamin (VITAMIN B-12) 100 MCG Tab, Take 100 mcg by mouth every day., Disp: , Rfl:     VITAMIN D PO, Take 1 Tablet by mouth every day., Disp: , Rfl:

## 2025-07-10 ENCOUNTER — OFFICE VISIT (OUTPATIENT)
Dept: ENDOCRINOLOGY | Facility: MEDICAL CENTER | Age: 80
End: 2025-07-10
Payer: MEDICARE

## 2025-07-10 VITALS
HEIGHT: 65 IN | DIASTOLIC BLOOD PRESSURE: 60 MMHG | BODY MASS INDEX: 23.43 KG/M2 | HEART RATE: 70 BPM | WEIGHT: 140.6 LBS | OXYGEN SATURATION: 97 % | SYSTOLIC BLOOD PRESSURE: 121 MMHG

## 2025-07-10 DIAGNOSIS — E55.9 VITAMIN D DEFICIENCY: ICD-10-CM

## 2025-07-10 DIAGNOSIS — E04.2 MULTIPLE THYROID NODULES: Primary | ICD-10-CM

## 2025-07-10 DIAGNOSIS — E06.3 HASHIMOTO'S DISEASE: ICD-10-CM

## 2025-07-10 DIAGNOSIS — R73.03 PREDIABETES: ICD-10-CM

## 2025-07-10 PROCEDURE — 99213 OFFICE O/P EST LOW 20 MIN: CPT

## 2025-07-10 PROCEDURE — 3078F DIAST BP <80 MM HG: CPT

## 2025-07-10 PROCEDURE — 99214 OFFICE O/P EST MOD 30 MIN: CPT

## 2025-07-10 PROCEDURE — 3074F SYST BP LT 130 MM HG: CPT

## 2025-07-10 NOTE — PROGRESS NOTES
Chief Complaint: Consult requested by VIRAJ Del Cid for evaluation of Hypothyroidism    HPI:     Mandie Juan is a 79 y.o. female with history of Hashimoto's disease diagnosed on February 2025 and is here for initial evaluation.    History of RA  She is currently under stress due to her grand daughter     Hashimoto's  Denies:  mind fog, fatigue which has been present for past few months which she contributes to her current situation with her grand daughter.     She denies weight gain, feeling cold and cold intolerance, constipation, swelling, losing hair, anxiousness, feeling excessive energy, tremulousness, palpitations, sweating, and weight loss.      She denies lumps or enlargement in the neck.    She denies a family history of thyroid disease       Latest Reference Range & Units 02/14/25 12:44   Microsomal -Tpo- Abs 0.0 - 9.0 IU/mL >600.0 (H)      Latest Reference Range & Units 02/14/25 12:44   TSH 0.350 - 5.500 uIU/mL 2.110   Free T-4 0.93 - 1.70 ng/dL 1.03   T3,Free 2.00 - 4.40 pg/mL 2.92     2. Thyroid Nodules  She denies shortness of breath, difficulty swaollowing, anterior neck pain or swelling   Denies radiation therapy to head or neck  US of thyroid on 3/28/25  Nodule #1  Location:  Left  mid  Size:  1.30 x 1.13 x 1.19 cm  Composition:  Solid-2  Echogenicity:  Hypoechoic-2  Shape:  Wider than tall-0  Margins:  Smooth-0  Echogenic Foci:  None-0     ACR TIRADS points/category:  4 - TR4 - Moderately Suspicious     Nodule #2  Location:  Left  lower  Size:  0.80 x 0.85 x 0.77 cm  Composition:  Solid-2  Echogenicity:  Hypoechoic-2  Shape:  Wider than tall-0  Margins:  Smooth-0  Echogenic Foci:  None-0     ACR TIRADS points/category:  4 - TR4 - Moderately Suspicious    ADDENDUM:     This case is being addended to report the results of [facility] testing,   performed by [facility].     Nodule- [A] Thyroid, Middle Left,1.3 cm.   [facility] Genomic Sequencing  - Benign  (Risk OF Malignancy    4%    )     Please see separate [facility] report for further details.     Addendum Signed By: Skip Lambert D.O.   Addendum Date: 07/03/2025   Original Report Date: 06/25/2025     FINAL DIAGNOSIS:     A. Left middle lobe thyroid fine needle aspiration:          Jensen category III: Atypia of undetermined significance.          The sample is moderately cellular and shows follicular groups           which demonstrate some microfollicular architecture and           cytologic atypia.          [facility] testing sent. Results will follow in an addendum.     Currently taking vitamin D 3 4000 IU daily   Latest Reference Range & Units 02/14/25 12:44   25-Hydroxy   Vitamin D 25 30 - 100 ng/mL 27 (L)     3. prediabetes  A1c 5.8 In clinic  Diet: generally healthy  Activity: walking   Latest Reference Range & Units 07/29/24 07:04 02/14/25 12:44   Glycohemoglobin 4.0 - 5.6 % 5.9 (H) 6.7 (H)   Estim. Avg Glu mg/dL 123 146     Patient's medications, allergies, and social histories were reviewed and updated as appropriate.      ROS:     CONS:     No fever, no chills, no weight loss, no fatigue   EYES:      No diplopia, no blurry vision, no redness of eyes, no swelling of eyelids   ENT:    No hearing loss, No ear pain, No sore throat, no dysphagia, no neck swelling   CV:     No chest pain, no palpitations, no claudication, no orthopnea, no PND   PULM:    No SOB, no cough, no hemoptysis, no wheezing    GI:   No nausea, no vomiting, no diarrhea, no constipation, no bloody stools   :  Passing urine well, no dysuria, no hematuria   ENDO:   No polyuria, no polydipsia, no heat intolerance, no cold intolerance   NEURO: No headaches, no dizziness, no convulsions, no tremors   MUSC:  No joint swellings, no arthralgias, no myalgias, no weakness   SKIN:   No rash, no ulcers, no dry skin   PSYCH:   No depression, no anxiety, no difficulty sleeping       Past Medical History:  Patient Active Problem List    Diagnosis Date Noted    Anti-TPO antibodies  present 04/02/2025    Multiple thyroid nodules 04/02/2025    Chronic bilateral low back pain without sciatica 02/16/2022    DDD (degenerative disc disease), lumbar 02/16/2022    Generalized anxiety disorder 01/25/2021    Primary osteoarthritis involving multiple joints 01/07/2021    Hypertension, essential     GERD without esophagitis     Impaired fasting glucose     Internal hemorrhoids     Allergic rhinitis due to allergen 02/21/2014    Basal cell carcinoma     Vitamin D deficiency 11/03/2011    Hyperlipidemia with target LDL less than 130 11/03/2011    Cervical polyp     Kidney stones     Colon polyps        Past Surgical History:  Past Surgical History[1]     Allergies:  Compazine, Pcn [penicillins], and Sulfa drugs     Current Medications:  Current Medications[2]    Social History:  Social History     Socioeconomic History    Marital status:      Spouse name: Not on file    Number of children: Not on file    Years of education: Not on file    Highest education level: Not on file   Occupational History    Not on file   Tobacco Use    Smoking status: Never    Smokeless tobacco: Never   Vaping Use    Vaping status: Never Used   Substance and Sexual Activity    Alcohol use: Yes     Alcohol/week: 2.0 oz     Types: 4 Glasses of wine per week    Drug use: No    Sexual activity: Not Currently     Partners: Male   Other Topics Concern    Not on file   Social History Narrative    Not on file     Social Drivers of Health     Financial Resource Strain: Not on file   Food Insecurity: Not on file   Transportation Needs: Not on file   Physical Activity: Not on file   Stress: Not on file   Social Connections: Not on file   Intimate Partner Violence: Not on file   Housing Stability: Not on file        Family History:   Family History   Problem Relation Age of Onset    Heart Disease Mother     Cancer Father     Hypertension Sister     Hypertension Brother     Cancer Paternal Grandfather          PHYSICAL EXAM:   Vital  "signs: /60 (BP Location: Right arm, Patient Position: Sitting, BP Cuff Size: Adult)   Pulse 70   Ht 1.651 m (5' 5\")   Wt 63.8 kg (140 lb 9.6 oz)   LMP 03/01/1993   SpO2 97%   BMI 23.40 kg/m²   GENERAL: Well-developed, well-nourished  in no apparent distress.   EYE: No ocular and eyelid asymmetry, Anicteric sclerae,  PERRL  HENT: Hearing grossly intact, Normocephalic, atraumatic. Pink, moist mucous membranes, No exudate  NECK: Supple. Trachea midline. thyroid is normal in size without nodules or tenderness  CARDIOVASCULAR: Regular rate and rhythm. No murmurs, rubs, or gallops.   LUNGS: Clear to auscultation bilaterally   ABDOMEN: Soft, nontender with positive bowel sounds.   EXTREMITIES: No clubbing, cyanosis, or edema.   NEUROLOGICAL: Cranial nerves II-XII are grossly intact   Symmetric reflexes at the patella no proximal muscle weakness  LYMPH: No cervical, supraclavicular,  adenopathy palpated.   SKIN: No rashes, lesions. Turgor is normal.    Labs:  Lab Results   Component Value Date/Time    WBC 4.7 (L) 10/01/2022 04:53 AM    WBC 4.3 05/07/2010 07:39 AM    RBC 4.59 10/01/2022 04:53 AM    RBC 4.32 05/07/2010 07:39 AM    HEMOGLOBIN 13.8 10/01/2022 04:53 AM    MCV 88.7 10/01/2022 04:53 AM    MCV 91 05/07/2010 07:39 AM    MCH 30.1 10/01/2022 04:53 AM    MCH 30.6 05/07/2010 07:39 AM    MCHC 33.9 10/01/2022 04:53 AM    RDW 40.1 10/01/2022 04:53 AM    RDW 13.3 05/07/2010 07:39 AM    MPV 9.6 10/01/2022 04:53 AM       Lab Results   Component Value Date/Time    SODIUM 139 07/29/2024 07:04 AM    POTASSIUM 4.2 07/29/2024 07:04 AM    CHLORIDE 103 07/29/2024 07:04 AM    CO2 25 07/29/2024 07:04 AM    ANION 11.0 07/29/2024 07:04 AM    GLUCOSE 108 (H) 07/29/2024 07:04 AM    BUN 16 07/29/2024 07:04 AM    CREATININE 0.62 07/29/2024 07:04 AM    CREATININE 0.68 11/21/2012 10:00 AM    CALCIUM 9.7 07/29/2024 07:04 AM    ASTSGOT 21 07/29/2024 07:04 AM    ALTSGPT 22 07/29/2024 07:04 AM    TBILIRUBIN 0.4 07/29/2024 07:04 AM    " "ALBUMIN 4.1 07/29/2024 07:04 AM    TOTPROTEIN 6.6 07/29/2024 07:04 AM    GLOBULIN 2.5 07/29/2024 07:04 AM    AGRATIO 1.6 07/29/2024 07:04 AM       Lab Results   Component Value Date/Time    CHOLSTRLTOT 177 07/29/2024 0704    TRIGLYCERIDE 68 07/29/2024 0704    HDL 81 07/29/2024 0704    LDL 82 07/29/2024 0704    CHOLHDLRAT 3.6 11/25/2014 0818       Lab Results   Component Value Date/Time    TSHULTRASEN 2.110 02/14/2025 1244     Lab Results   Component Value Date/Time    FREET4 1.03 02/14/2025 1244     Lab Results   Component Value Date/Time    FREET3 2.92 02/14/2025 1244     No results found for: \"THYSTIMIG\"    Lab Results   Component Value Date/Time    MICROSOMALA >600.0 (H) 02/14/2025 1244         Imaging:  3/28/2025 11:02 AM     HISTORY/REASON FOR EXAM:  Thyroiditis     TECHNIQUE/EXAM DESCRIPTION:  Ultrasound of the soft tissues of the head and neck.     COMPARISON:  None     FINDINGS:  The thyroid gland is heterogeneous.  Vascularity is normal.     The right lobe of the thyroid gland measures 2.52 cm x 5.13 cm x 2.66 cm.  The left lobe of the thyroid gland measures 2.84 cm x 5.33 cm x 2.16 cm.  The isthmus measures 0.56 cm.     Nodules >= 1cm:        Nodule #1  Location:  Left  mid  Size:  1.30 x 1.13 x 1.19 cm  Composition:  Solid-2  Echogenicity:  Hypoechoic-2  Shape:  Wider than tall-0  Margins:  Smooth-0  Echogenic Foci:  None-0     ACR TIRADS points/category:  4 - TR4 - Moderately Suspicious     Nodule #2  Location:  Left  lower  Size:  0.80 x 0.85 x 0.77 cm  Composition:  Solid-2  Echogenicity:  Hypoechoic-2  Shape:  Wider than tall-0  Margins:  Smooth-0  Echogenic Foci:  None-0     ACR TIRADS points/category:  4 - TR4 - Moderately Suspicious           IMPRESSION:     TR4 nodules in the left thyroid gland, measuring up to 1.3 cm     ACR TI-RADS Recommendations  TR4 (1-1.4cm) - follow up ultrasound in 1,2,3 and 5 years     Recommendations based on the American College of Radiology Thyroid imaging, reporting " and Data System (TI-RADS) 2017.        INTERPRETING LOCATION:  [place], [id number]    ASSESSMENT/PLAN:   1. Multiple thyroid nodules (Primary)  Stable  Reviewed the biopsy results with patient which was benign  At this time we will continue to monitor and repeat US in one year.   Patient understand notify immediately should she experience compressive symtpoms.   - US-THYROID; Future    2. Hashimoto's disease  Patient's current thyroid levels are WNL. Normal thyroid levels do not warrant the need to start treatment.   She is asymptomatic at this time.   We will continue to monitor and repeat thyroid levels in one year.   - TSH; Future  - FREE THYROXINE; Future  - T3 FREE; Future    3. Vitamin D deficiency  stable  continue vitamin D 3 4000 IU daily   - Comp Metabolic Panel; Future  - VITAMIN D,25 HYDROXY (DEFICIENCY); Future    4. Prediabetes  Stable  A1c 5.8 in clinic  Recommend diet low in fats and carbs  Recommend 150mins/activity weekly    Return in about 1 year (around 7/10/2026). Patient will have blood work done prior to follow up in 1 year    Thank you kindly for allowing me to participate in the thyroid care plan for this patient.    Reddy Slater, APRN   7/10/25    CC:   VIRAJ Del Cid         [1] No past surgical history on file.  [2]   Current Outpatient Medications:     Apoaequorin (PREVAGEN) 10 MG Cap, Take 10 mg by mouth every Mon, Wed, Fri, Sat, and Sun at 6 PM., Disp: , Rfl:     atorvastatin (LIPITOR) 10 MG Tab, TAKE ONE TABLET BY MOUTH EVERY DAY GENERIC FOR LIPITOR, Disp: 100 Tablet, Rfl: 3    hydrOXYzine HCl (ATARAX) 25 MG Tab, Take 1 Tablet by mouth 3 times a day as needed for Itching., Disp: 90 Tablet, Rfl: 0    gabapentin (NEURONTIN) 100 MG Cap, Take 2 Capsules by mouth at bedtime., Disp: 200 Capsule, Rfl: 3    losartan-hydrochlorothiazide (HYZAAR) 50-12.5 MG per tablet, TAKE ONE TABLET BY MOUTH EVERY DAY, Disp: 100 Tablet, Rfl: 3    cyanocobalamin (VITAMIN B-12) 100 MCG  Tab, Take 100 mcg by mouth every day., Disp: , Rfl:     VITAMIN D PO, Take 1 Tablet by mouth every day., Disp: , Rfl:

## 2025-07-22 ENCOUNTER — TELEPHONE (OUTPATIENT)
Dept: MEDICAL GROUP | Facility: MEDICAL CENTER | Age: 80
End: 2025-07-22

## 2025-07-22 ENCOUNTER — HOSPITAL ENCOUNTER (OUTPATIENT)
Dept: RADIOLOGY | Facility: MEDICAL CENTER | Age: 80
DRG: 641 | End: 2025-07-22
Attending: NURSE PRACTITIONER
Payer: MEDICARE

## 2025-07-22 ENCOUNTER — OFFICE VISIT (OUTPATIENT)
Dept: MEDICAL GROUP | Facility: MEDICAL CENTER | Age: 80
End: 2025-07-22
Payer: MEDICARE

## 2025-07-22 VITALS
SYSTOLIC BLOOD PRESSURE: 100 MMHG | BODY MASS INDEX: 22.41 KG/M2 | OXYGEN SATURATION: 97 % | HEIGHT: 65 IN | HEART RATE: 74 BPM | TEMPERATURE: 97 F | DIASTOLIC BLOOD PRESSURE: 56 MMHG | WEIGHT: 134.48 LBS

## 2025-07-22 DIAGNOSIS — E55.9 VITAMIN D DEFICIENCY: ICD-10-CM

## 2025-07-22 DIAGNOSIS — E78.5 HYPERLIPIDEMIA LDL GOAL <100: ICD-10-CM

## 2025-07-22 DIAGNOSIS — K59.00 CONSTIPATION, UNSPECIFIED CONSTIPATION TYPE: Primary | ICD-10-CM

## 2025-07-22 DIAGNOSIS — R73.01 IFG (IMPAIRED FASTING GLUCOSE): Primary | ICD-10-CM

## 2025-07-22 DIAGNOSIS — K59.00 CONSTIPATION, UNSPECIFIED CONSTIPATION TYPE: ICD-10-CM

## 2025-07-22 DIAGNOSIS — M25.551 RIGHT HIP PAIN: ICD-10-CM

## 2025-07-22 DIAGNOSIS — I10 HYPERTENSION, ESSENTIAL: ICD-10-CM

## 2025-07-22 PROCEDURE — 3078F DIAST BP <80 MM HG: CPT | Performed by: NURSE PRACTITIONER

## 2025-07-22 PROCEDURE — 73501 X-RAY EXAM HIP UNI 1 VIEW: CPT | Mod: RT

## 2025-07-22 PROCEDURE — 3074F SYST BP LT 130 MM HG: CPT | Performed by: NURSE PRACTITIONER

## 2025-07-22 PROCEDURE — 99214 OFFICE O/P EST MOD 30 MIN: CPT | Performed by: NURSE PRACTITIONER

## 2025-07-22 PROCEDURE — 74019 RADEX ABDOMEN 2 VIEWS: CPT

## 2025-07-23 ENCOUNTER — HOSPITAL ENCOUNTER (OUTPATIENT)
Dept: LAB | Facility: MEDICAL CENTER | Age: 80
End: 2025-07-23
Attending: NURSE PRACTITIONER
Payer: MEDICARE

## 2025-07-23 ENCOUNTER — HOSPITAL ENCOUNTER (INPATIENT)
Facility: MEDICAL CENTER | Age: 80
LOS: 1 days | DRG: 641 | End: 2025-07-24
Attending: STUDENT IN AN ORGANIZED HEALTH CARE EDUCATION/TRAINING PROGRAM | Admitting: STUDENT IN AN ORGANIZED HEALTH CARE EDUCATION/TRAINING PROGRAM
Payer: MEDICARE

## 2025-07-23 DIAGNOSIS — R19.7 DIARRHEA, UNSPECIFIED TYPE: ICD-10-CM

## 2025-07-23 DIAGNOSIS — E87.1 HYPONATREMIA: Primary | ICD-10-CM

## 2025-07-23 DIAGNOSIS — R73.01 IFG (IMPAIRED FASTING GLUCOSE): ICD-10-CM

## 2025-07-23 DIAGNOSIS — I10 HYPERTENSION, ESSENTIAL: ICD-10-CM

## 2025-07-23 DIAGNOSIS — E55.9 VITAMIN D DEFICIENCY: ICD-10-CM

## 2025-07-23 DIAGNOSIS — K59.00 CONSTIPATION, UNSPECIFIED CONSTIPATION TYPE: ICD-10-CM

## 2025-07-23 LAB
25(OH)D3 SERPL-MCNC: 46 NG/ML (ref 30–100)
ALBUMIN SERPL BCP-MCNC: 3.8 G/DL (ref 3.2–4.9)
ALBUMIN/GLOB SERPL: 1.5 G/DL
ALP SERPL-CCNC: 65 U/L (ref 30–99)
ALT SERPL-CCNC: 18 U/L (ref 2–50)
ANION GAP SERPL CALC-SCNC: 12 MMOL/L (ref 7–16)
ANION GAP SERPL CALC-SCNC: 14 MMOL/L (ref 7–16)
AST SERPL-CCNC: 18 U/L (ref 12–45)
BASOPHILS # BLD AUTO: 0.4 % (ref 0–1.8)
BASOPHILS # BLD: 0.03 K/UL (ref 0–0.12)
BILIRUB SERPL-MCNC: 0.4 MG/DL (ref 0.1–1.5)
BUN SERPL-MCNC: 14 MG/DL (ref 8–22)
BUN SERPL-MCNC: 9 MG/DL (ref 8–22)
CALCIUM ALBUM COR SERPL-MCNC: 9.3 MG/DL (ref 8.5–10.5)
CALCIUM SERPL-MCNC: 10 MG/DL (ref 8.5–10.5)
CALCIUM SERPL-MCNC: 9.1 MG/DL (ref 8.5–10.5)
CHLORIDE SERPL-SCNC: 92 MMOL/L (ref 96–112)
CHLORIDE SERPL-SCNC: 93 MMOL/L (ref 96–112)
CO2 SERPL-SCNC: 21 MMOL/L (ref 20–33)
CO2 SERPL-SCNC: 25 MMOL/L (ref 20–33)
CREAT SERPL-MCNC: 0.64 MG/DL (ref 0.5–1.4)
CREAT SERPL-MCNC: 0.67 MG/DL (ref 0.5–1.4)
CREAT UR-MCNC: 25.3 MG/DL
EKG IMPRESSION: NORMAL
EOSINOPHIL # BLD AUTO: 0.1 K/UL (ref 0–0.51)
EOSINOPHIL NFR BLD: 1.4 % (ref 0–6.9)
ERYTHROCYTE [DISTWIDTH] IN BLOOD BY AUTOMATED COUNT: 37.6 FL (ref 35.9–50)
EST. AVERAGE GLUCOSE BLD GHB EST-MCNC: 128 MG/DL
GFR SERPLBLD CREATININE-BSD FMLA CKD-EPI: 87 ML/MIN/1.73 M 2
GFR SERPLBLD CREATININE-BSD FMLA CKD-EPI: 88 ML/MIN/1.73 M 2
GLOBULIN SER CALC-MCNC: 2.5 G/DL (ref 1.9–3.5)
GLUCOSE SERPL-MCNC: 100 MG/DL (ref 65–99)
GLUCOSE SERPL-MCNC: 107 MG/DL (ref 65–99)
HBA1C MFR BLD: 6.1 % (ref 4–5.6)
HCT VFR BLD AUTO: 33.1 % (ref 37–47)
HGB BLD-MCNC: 11.6 G/DL (ref 12–16)
IMM GRANULOCYTES # BLD AUTO: 0.02 K/UL (ref 0–0.11)
IMM GRANULOCYTES NFR BLD AUTO: 0.3 % (ref 0–0.9)
LIPASE SERPL-CCNC: 38 U/L (ref 11–82)
LYMPHOCYTES # BLD AUTO: 1.94 K/UL (ref 1–4.8)
LYMPHOCYTES NFR BLD: 27.7 % (ref 22–41)
MAGNESIUM SERPL-MCNC: 1.8 MG/DL (ref 1.5–2.5)
MCH RBC QN AUTO: 30.1 PG (ref 27–33)
MCHC RBC AUTO-ENTMCNC: 35 G/DL (ref 32.2–35.5)
MCV RBC AUTO: 86 FL (ref 81.4–97.8)
MICROALBUMIN UR-MCNC: <1.2 MG/DL
MICROALBUMIN/CREAT UR: NORMAL MG/G (ref 0–30)
MONOCYTES # BLD AUTO: 0.81 K/UL (ref 0–0.85)
MONOCYTES NFR BLD AUTO: 11.6 % (ref 0–13.4)
NEUTROPHILS # BLD AUTO: 4.1 K/UL (ref 1.82–7.42)
NEUTROPHILS NFR BLD: 58.6 % (ref 44–72)
NRBC # BLD AUTO: 0 K/UL
NRBC BLD-RTO: 0 /100 WBC (ref 0–0.2)
PLATELET # BLD AUTO: 269 K/UL (ref 164–446)
PMV BLD AUTO: 8.7 FL (ref 9–12.9)
POTASSIUM SERPL-SCNC: 3.4 MMOL/L (ref 3.6–5.5)
POTASSIUM SERPL-SCNC: 3.7 MMOL/L (ref 3.6–5.5)
PROT SERPL-MCNC: 6.3 G/DL (ref 6–8.2)
RBC # BLD AUTO: 3.85 M/UL (ref 4.2–5.4)
SODIUM SERPL-SCNC: 128 MMOL/L (ref 135–145)
SODIUM SERPL-SCNC: 129 MMOL/L (ref 135–145)
WBC # BLD AUTO: 7 K/UL (ref 4.8–10.8)

## 2025-07-23 PROCEDURE — 83930 ASSAY OF BLOOD OSMOLALITY: CPT

## 2025-07-23 PROCEDURE — 82570 ASSAY OF URINE CREATININE: CPT

## 2025-07-23 PROCEDURE — 85025 COMPLETE CBC W/AUTO DIFF WBC: CPT

## 2025-07-23 PROCEDURE — 700105 HCHG RX REV CODE 258: Performed by: STUDENT IN AN ORGANIZED HEALTH CARE EDUCATION/TRAINING PROGRAM

## 2025-07-23 PROCEDURE — 80053 COMPREHEN METABOLIC PANEL: CPT

## 2025-07-23 PROCEDURE — 82140 ASSAY OF AMMONIA: CPT

## 2025-07-23 PROCEDURE — 83690 ASSAY OF LIPASE: CPT

## 2025-07-23 PROCEDURE — 82306 VITAMIN D 25 HYDROXY: CPT

## 2025-07-23 PROCEDURE — 82043 UR ALBUMIN QUANTITATIVE: CPT

## 2025-07-23 PROCEDURE — 36415 COLL VENOUS BLD VENIPUNCTURE: CPT

## 2025-07-23 PROCEDURE — 93005 ELECTROCARDIOGRAM TRACING: CPT | Mod: TC

## 2025-07-23 PROCEDURE — 83735 ASSAY OF MAGNESIUM: CPT

## 2025-07-23 PROCEDURE — 80048 BASIC METABOLIC PNL TOTAL CA: CPT

## 2025-07-23 PROCEDURE — 83036 HEMOGLOBIN GLYCOSYLATED A1C: CPT

## 2025-07-23 PROCEDURE — 99285 EMERGENCY DEPT VISIT HI MDM: CPT

## 2025-07-23 PROCEDURE — 93005 ELECTROCARDIOGRAM TRACING: CPT | Mod: TC | Performed by: STUDENT IN AN ORGANIZED HEALTH CARE EDUCATION/TRAINING PROGRAM

## 2025-07-23 RX ORDER — SODIUM CHLORIDE 9 MG/ML
250 INJECTION, SOLUTION INTRAVENOUS ONCE
Status: COMPLETED | OUTPATIENT
Start: 2025-07-23 | End: 2025-07-23

## 2025-07-23 RX ADMIN — SODIUM CHLORIDE 250 ML: 9 INJECTION, SOLUTION INTRAVENOUS at 23:18

## 2025-07-23 NOTE — TELEPHONE ENCOUNTER
Tc to pt. She has not been able to have a BM yet. Abd xray did not show an obstruction but was nonspecific. She is ok iwth doing a ct abd to assess.  I will order that test.  Her rt hip has mod to severe OA.  [facility].  Will also refer to ortho for further eval and tx.  She agrees

## 2025-07-24 ENCOUNTER — APPOINTMENT (OUTPATIENT)
Dept: RADIOLOGY | Facility: MEDICAL CENTER | Age: 80
DRG: 641 | End: 2025-07-24
Attending: EMERGENCY MEDICINE
Payer: MEDICARE

## 2025-07-24 ENCOUNTER — APPOINTMENT (OUTPATIENT)
Dept: RADIOLOGY | Facility: MEDICAL CENTER | Age: 80
DRG: 641 | End: 2025-07-24
Attending: STUDENT IN AN ORGANIZED HEALTH CARE EDUCATION/TRAINING PROGRAM
Payer: MEDICARE

## 2025-07-24 ENCOUNTER — APPOINTMENT (OUTPATIENT)
Dept: MEDICAL GROUP | Facility: MEDICAL CENTER | Age: 80
End: 2025-07-24
Payer: MEDICARE

## 2025-07-24 VITALS
OXYGEN SATURATION: 98 % | HEIGHT: 65 IN | DIASTOLIC BLOOD PRESSURE: 58 MMHG | BODY MASS INDEX: 22.44 KG/M2 | WEIGHT: 134.7 LBS | RESPIRATION RATE: 16 BRPM | HEART RATE: 63 BPM | SYSTOLIC BLOOD PRESSURE: 127 MMHG | TEMPERATURE: 97.2 F

## 2025-07-24 PROBLEM — E87.1 HYPONATREMIA: Status: ACTIVE | Noted: 2025-07-24

## 2025-07-24 LAB
AMMONIA PLAS-SCNC: 11 UMOL/L (ref 11–45)
APPEARANCE UR: CLEAR
BILIRUB UR QL STRIP.AUTO: NEGATIVE
COLOR UR: YELLOW
GLUCOSE UR STRIP.AUTO-MCNC: NEGATIVE MG/DL
KETONES UR STRIP.AUTO-MCNC: NEGATIVE MG/DL
LEUKOCYTE ESTERASE UR QL STRIP.AUTO: NEGATIVE
MICRO URNS: NORMAL
NITRITE UR QL STRIP.AUTO: NEGATIVE
OSMOLALITY SERPL: 273 MOSM/KG H2O (ref 278–298)
OSMOLALITY UR: 170 MOSM/KG H2O (ref 300–900)
PH UR STRIP.AUTO: 6.5 [PH] (ref 5–8)
PROT UR QL STRIP: NEGATIVE MG/DL
RBC UR QL AUTO: NEGATIVE
SODIUM SERPL-SCNC: 130 MMOL/L (ref 135–145)
SODIUM SERPL-SCNC: 132 MMOL/L (ref 135–145)
SODIUM UR-SCNC: 27 MMOL/L
SP GR UR STRIP.AUTO: 1.02
UROBILINOGEN UR STRIP.AUTO-MCNC: 0.2 EU/DL

## 2025-07-24 PROCEDURE — 81003 URINALYSIS AUTO W/O SCOPE: CPT

## 2025-07-24 PROCEDURE — 306637 HCHG MISC ORTHO ITEM RC 0274

## 2025-07-24 PROCEDURE — 36415 COLL VENOUS BLD VENIPUNCTURE: CPT

## 2025-07-24 PROCEDURE — 96361 HYDRATE IV INFUSION ADD-ON: CPT

## 2025-07-24 PROCEDURE — 84295 ASSAY OF SERUM SODIUM: CPT

## 2025-07-24 PROCEDURE — 700111 HCHG RX REV CODE 636 W/ 250 OVERRIDE (IP): Mod: JZ | Performed by: HOSPITALIST

## 2025-07-24 PROCEDURE — 700102 HCHG RX REV CODE 250 W/ 637 OVERRIDE(OP): Performed by: STUDENT IN AN ORGANIZED HEALTH CARE EDUCATION/TRAINING PROGRAM

## 2025-07-24 PROCEDURE — 96374 THER/PROPH/DIAG INJ IV PUSH: CPT

## 2025-07-24 PROCEDURE — 99223 1ST HOSP IP/OBS HIGH 75: CPT | Mod: AI | Performed by: STUDENT IN AN ORGANIZED HEALTH CARE EDUCATION/TRAINING PROGRAM

## 2025-07-24 PROCEDURE — 700105 HCHG RX REV CODE 258: Performed by: STUDENT IN AN ORGANIZED HEALTH CARE EDUCATION/TRAINING PROGRAM

## 2025-07-24 PROCEDURE — 84300 ASSAY OF URINE SODIUM: CPT

## 2025-07-24 PROCEDURE — 83935 ASSAY OF URINE OSMOLALITY: CPT

## 2025-07-24 PROCEDURE — A9270 NON-COVERED ITEM OR SERVICE: HCPCS | Performed by: STUDENT IN AN ORGANIZED HEALTH CARE EDUCATION/TRAINING PROGRAM

## 2025-07-24 PROCEDURE — 74177 CT ABD & PELVIS W/CONTRAST: CPT

## 2025-07-24 PROCEDURE — 70450 CT HEAD/BRAIN W/O DYE: CPT

## 2025-07-24 PROCEDURE — 700117 HCHG RX CONTRAST REV CODE 255: Performed by: EMERGENCY MEDICINE

## 2025-07-24 RX ORDER — SODIUM CHLORIDE 9 MG/ML
INJECTION, SOLUTION INTRAVENOUS CONTINUOUS
Status: DISCONTINUED | OUTPATIENT
Start: 2025-07-24 | End: 2025-07-24 | Stop reason: HOSPADM

## 2025-07-24 RX ORDER — OXYCODONE HYDROCHLORIDE 5 MG/1
5 TABLET ORAL
Refills: 0 | Status: DISCONTINUED | OUTPATIENT
Start: 2025-07-24 | End: 2025-07-24 | Stop reason: HOSPADM

## 2025-07-24 RX ORDER — ONDANSETRON 2 MG/ML
4 INJECTION INTRAMUSCULAR; INTRAVENOUS EVERY 4 HOURS PRN
Status: DISCONTINUED | OUTPATIENT
Start: 2025-07-24 | End: 2025-07-24 | Stop reason: HOSPADM

## 2025-07-24 RX ORDER — CLINDAMYCIN HYDROCHLORIDE 150 MG/1
150 CAPSULE ORAL 3 TIMES DAILY
Status: SHIPPED | COMMUNITY
Start: 2025-07-15 | End: 2025-07-24

## 2025-07-24 RX ORDER — HYDROMORPHONE HYDROCHLORIDE 1 MG/ML
0.25 INJECTION, SOLUTION INTRAMUSCULAR; INTRAVENOUS; SUBCUTANEOUS
Status: DISCONTINUED | OUTPATIENT
Start: 2025-07-24 | End: 2025-07-24 | Stop reason: HOSPADM

## 2025-07-24 RX ORDER — LABETALOL HYDROCHLORIDE 5 MG/ML
10 INJECTION, SOLUTION INTRAVENOUS EVERY 4 HOURS PRN
Status: DISCONTINUED | OUTPATIENT
Start: 2025-07-24 | End: 2025-07-24 | Stop reason: HOSPADM

## 2025-07-24 RX ORDER — ONDANSETRON 4 MG/1
4 TABLET, ORALLY DISINTEGRATING ORAL EVERY 4 HOURS PRN
Status: DISCONTINUED | OUTPATIENT
Start: 2025-07-24 | End: 2025-07-24 | Stop reason: HOSPADM

## 2025-07-24 RX ORDER — ATORVASTATIN CALCIUM 10 MG/1
10 TABLET, FILM COATED ORAL EVERY EVENING
Status: DISCONTINUED | OUTPATIENT
Start: 2025-07-24 | End: 2025-07-24 | Stop reason: HOSPADM

## 2025-07-24 RX ORDER — POTASSIUM CHLORIDE 1500 MG/1
40 TABLET, EXTENDED RELEASE ORAL ONCE
Status: COMPLETED | OUTPATIENT
Start: 2025-07-24 | End: 2025-07-24

## 2025-07-24 RX ORDER — ENOXAPARIN SODIUM 100 MG/ML
40 INJECTION SUBCUTANEOUS DAILY
Status: DISCONTINUED | OUTPATIENT
Start: 2025-07-24 | End: 2025-07-24 | Stop reason: HOSPADM

## 2025-07-24 RX ORDER — ACETAMINOPHEN 325 MG/1
650 TABLET ORAL EVERY 6 HOURS PRN
Status: DISCONTINUED | OUTPATIENT
Start: 2025-07-24 | End: 2025-07-24 | Stop reason: HOSPADM

## 2025-07-24 RX ORDER — OXYCODONE HYDROCHLORIDE 5 MG/1
2.5 TABLET ORAL
Refills: 0 | Status: DISCONTINUED | OUTPATIENT
Start: 2025-07-24 | End: 2025-07-24 | Stop reason: HOSPADM

## 2025-07-24 RX ORDER — HYDRALAZINE HYDROCHLORIDE 20 MG/ML
10 INJECTION INTRAMUSCULAR; INTRAVENOUS EVERY 4 HOURS PRN
Status: DISCONTINUED | OUTPATIENT
Start: 2025-07-24 | End: 2025-07-24 | Stop reason: HOSPADM

## 2025-07-24 RX ADMIN — SODIUM CHLORIDE: 9 INJECTION, SOLUTION INTRAVENOUS at 04:30

## 2025-07-24 RX ADMIN — IOHEXOL 100 ML: 350 INJECTION, SOLUTION INTRAVENOUS at 01:38

## 2025-07-24 RX ADMIN — HYDRALAZINE HYDROCHLORIDE 10 MG: 20 INJECTION INTRAMUSCULAR; INTRAVENOUS at 09:01

## 2025-07-24 RX ADMIN — POTASSIUM CHLORIDE 40 MEQ: 1500 TABLET, EXTENDED RELEASE ORAL at 04:30

## 2025-07-24 SDOH — ECONOMIC STABILITY: TRANSPORTATION INSECURITY
IN THE PAST 12 MONTHS, HAS LACK OF RELIABLE TRANSPORTATION KEPT YOU FROM MEDICAL APPOINTMENTS, MEETINGS, WORK OR FROM GETTING THINGS NEEDED FOR DAILY LIVING?: NO

## 2025-07-24 SDOH — ECONOMIC STABILITY: TRANSPORTATION INSECURITY
IN THE PAST 12 MONTHS, HAS THE LACK OF TRANSPORTATION KEPT YOU FROM MEDICAL APPOINTMENTS OR FROM GETTING MEDICATIONS?: NO

## 2025-07-24 ASSESSMENT — SOCIAL DETERMINANTS OF HEALTH (SDOH)
WITHIN THE LAST YEAR, HAVE YOU BEEN KICKED, HIT, SLAPPED, OR OTHERWISE PHYSICALLY HURT BY YOUR PARTNER OR EX-PARTNER?: NO
WITHIN THE PAST 12 MONTHS, THE FOOD YOU BOUGHT JUST DIDN'T LAST AND YOU DIDN'T HAVE MONEY TO GET MORE: NEVER TRUE
WITHIN THE LAST YEAR, HAVE YOU BEEN HUMILIATED OR EMOTIONALLY ABUSED IN OTHER WAYS BY YOUR PARTNER OR EX-PARTNER?: NO
WITHIN THE PAST 12 MONTHS, YOU WORRIED THAT YOUR FOOD WOULD RUN OUT BEFORE YOU GOT THE MONEY TO BUY MORE: NEVER TRUE
WITHIN THE LAST YEAR, HAVE TO BEEN RAPED OR FORCED TO HAVE ANY KIND OF SEXUAL ACTIVITY BY YOUR PARTNER OR EX-PARTNER?: NO
IN THE PAST 12 MONTHS, HAS THE ELECTRIC, GAS, OIL, OR WATER COMPANY THREATENED TO SHUT OFF SERVICE IN YOUR HOME?: NO
WITHIN THE LAST YEAR, HAVE YOU BEEN AFRAID OF YOUR PARTNER OR EX-PARTNER?: NO

## 2025-07-24 NOTE — PROGRESS NOTES
"Subjective:     History of Present Illness  The patient is an 79-year-old female who presents for a follow-up visit constipation and rt hip pain.    The primary reason for this visit is to follow up on her thyroid condition. She recently underwent thyroid nodule bx that was benign/atypical of undetermined significance.  She is followed by endocrinology.    She reports that her thyroid nodules are benign, and she will have another check-up in a year. Additionally, she underwent gum surgery for dental implants, which has significantly impacted her food intake, resulting in a weight loss of 10 pounds.     Since the surgery, she has not had a regular bowel movement and has experienced constipation followed by diarrhea. She has not taken any medication to stop the diarrhea and reports no vomiting. She has felt unwell for the past week due to constipation and lack of appetite. This morning, she consumed toast with honey and tea. Three nights ago, she experienced severe pain during a bowel movement, describing it as a \"plug\" that eventually passed, leading to diarrhea. She has been eating minimally and reports no abdominal pain, discomfort, or bloating. Typically, she has regular bowel movements, aided by two fiber gummies each morning. She reports no presence of blood or black tarry stool and no pain or burning sensation during urination. She was prescribed two pain medications and ibuprofen 600 mg, which she has since discontinued due to persistent discomfort. She has tried Senokot and Colace once to alleviate her symptoms.    She also reports arthritis in her hip, which has not been x-rayed recently. She recalls having x-rays done a long time ago due to her arthritis. She maintains an active lifestyle, walking daily, but has not been able to do so for the past week. She believes her hip may need rest as the pain seems to have subsided. She plans to resume walking once she feels better.    Diet: She has been eating " minimally, and this morning she consumed toast with honey and tea.  Coffee/Tea/Caffeine-containing Drinks: She consumed tea this morning.    PAST SURGICAL HISTORY:  - Gum surgery for dental implants    Results  - Labs:    - Fine needle aspiration of thyroid nodule: Atypia of undetermined significance    Current medicines (including changes today)  Current Medications[1]  Current Medications[2]    She  has a past medical history of Allergy, Basal cell carcinoma (2004), Cervical polyp, Chronic bilateral low back pain without sciatica (2/16/2022), Colon polyps, DDD (degenerative disc disease), lumbar (2/16/2022), Episode of recurrent major depressive disorder (HCC) (1/25/2021), Generalized anxiety disorder (1/25/2021), GERD without esophagitis, Hyperlipidemia, Hypertension, essential, Impaired fasting glucose, Internal hemorrhoids, Kidney stones, Primary osteoarthritis involving multiple joints (1/7/2021), and Vitamin d deficiency.    She has no past medical history of Breast cancer (HCC) or Clotting disorder (HCC).    ROS   Review of Systems   Constitutional: Negative.  Negative for fever, chills, weight loss, malaise/fatigue and diaphoresis.   HENT: Negative.  Negative for hearing loss, ear pain, nosebleeds, congestion, sore throat, neck pain, tinnitus and ear discharge.    Respiratory: Negative.  Negative for cough, hemoptysis, sputum production, shortness of breath, wheezing and stridor.    Cardiovascular: Negative.  Negative for chest pain, palpitations, orthopnea, claudication, leg swelling and PND.   Gastrointestinal: denies nausea, vomiting, heartburn, melena or hematochezia.  Genitourinary: Denies dysuria, hematuria, urinary incontinence, frequency or urgency.    Musculoskeletal: Negative.  Negative for myalgias and back pain.   Neurological: Negative.  Negative for dizziness, tingling, tremors, weakness and headaches.   Psych:  Denies depression, anxiety or insomnia.  All other systems reviewed and are  "negative.       Objective:     /56   Pulse 74   Temp 36.1 °C (97 °F) (Temporal)   Ht 1.651 m (5' 5\")   Wt 61 kg (134 lb 7.7 oz)   SpO2 97%  Body mass index is 22.38 kg/m².   Physical Exam  General: Pale appearance, weight loss of 10 pounds.  Mouth/Throat: Bruising noted on gums post-surgery.  Respiratory: Clear to auscultation, no wheezing, rales or rhonchi.  Cardiovascular: Regular rate and rhythm, murmur present.  Gastrointestinal: Bowel sounds present in all quadrants, no tenderness on palpation.  Physical Exam   Vitals reviewed.  Constitutional: oriented to person, place, and time. appears well-developed and well-nourished. No distress.   Neck: No JVD present.  Thyroid bx incision healing well  Cardiovascular: Normal rate, regular rhythm, normal heart sounds and intact distal pulses.  Exam reveals no gallop and no friction rub.  2/6 murmur heard.  No carotid bruits.   Pulmonary/Chest: Effort normal and breath sounds normal. No stridor. No respiratory distress. no wheezes or rales. exhibits no tenderness.   Musculoskeletal: Normal range of motion. exhibits no edema. osbaldo pedal pulses 2+.  Lymphadenopathy: no cervical or supraclavicular adenopathy.   Abd:  No CVAT,  Soft,  Bs noted in all quadrants.  No HSM.  No abdominal tenderness.  Neurological: alert and oriented to person, place, and time. exhibits normal muscle tone. Coordination normal.   Skin: Skin is warm and dry. no diaphoresis.   Psychiatric: normal mood and affect. behavior is normal.   Psychiatric: normal mood and affect. behavior is normal.       Assessment and Plan:   The following treatment plan was discussed    Assessment & Plan  1. Constipation  Symptoms suggest possible impaction with diarrhea likely due to fluid bypassing the impaction. No evidence of obstruction upon examination; bowel sounds present in all quadrants. An abdominal x-ray will be ordered today to confirm the absence of obstruction. Advised to maintain a soft, bland " diet including liquids, mashed potatoes, and protein drinks until bowel movements normalize. If no obstruction is found, MiraLAX 1 dose in the morning and 1 dose in the afternoon is recommended. If a normal bowel movement occurs, inform via MyChart immediately. If diarrhea persists, further testing or laxative use may be considered.    2. Hip pain  Reports hip pain possibly related to known arthritis. Physical examination did not reveal any acute findings. An x-ray of the right hip will be ordered today to assess the cause of the pain. Continue walking as tolerated and report any changes in symptoms.    3. Benign thyroid nodule  Fine needle aspiration showed atypia of undetermined significance. No current symptoms related to the thyroid nodule. Follow-up ultrasound is scheduled in a year to monitor the nodule.    Follow-up: A follow-up visit is scheduled in 1 to 2 weeks.      ORDERS:  1. Constipation, unspecified constipation type (Primary)    - [id number] VIEWS; Future    2. Right hip pain    - DX-HIP-UNILATERAL-WITH PELVIS-1 VIEW RIGHT; Future        Please note that this dictation was created using voice recognition software. I have made every reasonable attempt to correct obvious errors, but I expect that there are errors of grammar and possibly content that I did not discover before finalizing the note.      Attestation      Verbal consent was acquired by the patient to use [employer] ambient listening note generation during this visit Yes                  [1]   Current Outpatient Medications   Medication Sig Dispense Refill    Apoaequorin (PREVAGEN) 10 MG Cap Take 10 mg by mouth every Mon, Wed, Fri, Sat, and Sun at 6 PM.      atorvastatin (LIPITOR) 10 MG Tab TAKE ONE TABLET BY MOUTH EVERY DAY GENERIC FOR LIPITOR 100 Tablet 3    hydrOXYzine HCl (ATARAX) 25 MG Tab Take 1 Tablet by mouth 3 times a day as needed for Itching. 90 Tablet 0    gabapentin (NEURONTIN) 100 MG Cap Take 2 Capsules by mouth at bedtime.  200 Capsule 3    losartan-hydrochlorothiazide (HYZAAR) 50-12.5 MG per tablet TAKE ONE TABLET BY MOUTH EVERY  Tablet 3    cyanocobalamin (VITAMIN B-12) 100 MCG Tab Take 100 mcg by mouth every day.      VITAMIN D PO Take 1 Tablet by mouth every day.       No current facility-administered medications for this visit.   [2]   Current Outpatient Medications   Medication Sig Dispense Refill    Apoaequorin (PREVAGEN) 10 MG Cap Take 10 mg by mouth every Mon, Wed, Fri, Sat, and Sun at 6 PM.      atorvastatin (LIPITOR) 10 MG Tab TAKE ONE TABLET BY MOUTH EVERY DAY GENERIC FOR LIPITOR 100 Tablet 3    hydrOXYzine HCl (ATARAX) 25 MG Tab Take 1 Tablet by mouth 3 times a day as needed for Itching. 90 Tablet 0    gabapentin (NEURONTIN) 100 MG Cap Take 2 Capsules by mouth at bedtime. 200 Capsule 3    losartan-hydrochlorothiazide (HYZAAR) 50-12.5 MG per tablet TAKE ONE TABLET BY MOUTH EVERY  Tablet 3    cyanocobalamin (VITAMIN B-12) 100 MCG Tab Take 100 mcg by mouth every day.      VITAMIN D PO Take 1 Tablet by mouth every day.       No current facility-administered medications for this visit.

## 2025-07-24 NOTE — ASSESSMENT & PLAN NOTE
Initial sodium 128 in ER, repeat sodium 130.  Osmolality and urine studies pending at this time  CTAP negative for acute pathology.  Oral hydration encouraged.  Gentle hydration, trend sodium every 4 hours, monitor for overcorrection  C. Diff ordered as patient took 5 days of clindamycin  Hold home Hyzaar for now

## 2025-07-24 NOTE — ED NOTES
Med Rec completed per patient   Allergies reviewed    Patient completed a 7 day course of Clindamycin on 7/22/2025     Patient is not taking anticoagulants

## 2025-07-24 NOTE — PROGRESS NOTES
Discharge instructions reviewed with pt. Pt verbalizes understanding of all instructions. Assisted pt with ordering uber back home.

## 2025-07-24 NOTE — DISCHARGE SUMMARY
"Discharge Summary    CHIEF COMPLAINT ON ADMISSION  Chief Complaint   Patient presents with    Abnormal Labs     PCP called pt and told her to go to the ED for a low sodium level of 129. Pt had gum surgery about a week ago and has some swelling to her left cheek. Pt states she has felt confused for the pass couple days but today much worst.    Diarrhea     Pt got severe constipation from pain medication after surgery, pt took laxatives and stool softener and now has severe diarrhea.        Reason for Admission  Abnormal Test Results     Admission Date  7/23/2025    CODE STATUS  Full Code    HPI & HOSPITAL COURSE  Mandie Juan is a 79 y.o. female who presented 7/23/2025 with diarrhea.  Two Tuesdays ago, patient was noted to have some sort of gum surgery in anticipation for having implants in 3 months.  She finished her course of 5 days of clindamycin after and was taking narcotic medications for the pain.  Shortly after taking narcotic medications, patient was noted to have constipation and abdominal pain.  She tried to remedy this by taking a stool softener, to which she began to have multiple episodes of explosive diarrhea per day.  She states that she would have 4-5 episodes.  She tried to waited off as symptoms and initially got better, but her diarrhea returned several times.  She began to feel an overall generalized weakness, and intermittent episodes of \" fuzziness\" in her head.  She believes that she lost about 10 pounds since her surgery.  She went to see her primary care doctor, and found to have labs of 129, and was recommended to come to the ER for further evaluation.     In the ER, patient found to have normal vital signs.  Patient found to have sodium 128, potassium 3.4.  CT head showing no acute intracranial abnormality, possibly tiny meningioma, CT abdomen pelvis showing hazy right lower lobe opacity suggesting subtle infiltrates, changes of cirrhosis noted. Patient admitted for monitoring " of hyponatremia.    Patient seen and examined this morning in the ER holding area and she is afebrile her vitals are stable and patient is back to normal.  Patient's sodium did come up to 132 and patient is asking if she could be discharged home.  Patient states that she does not want to be transferred up to the floor and given the fact that her sodium is 132 I believe she is stable for discharge home.    Patient did have a hyponatremia likely from poor p.o. intake over the last week as well as diarrhea from previous constipation from narcotic use.  I did explain to the patient that she should go home and try to take good amount of p.o. intake and actually even salt her food for the next week or 2.  I explained to her that good healthy salt (not Dela Cruz's) is is key to good health.  Patient expressed understand this and stated she will comply.  I also expressed to her that she should discuss with her primary care doctor getting off of statin as she does not have diabetes and has not had a previous heart attack or stroke and statins are not indicated for primary prevention.    Therefore, she is discharged in good and stable condition to home with close outpatient follow-up.      Discharge Date  7/24/25    FOLLOW UP ITEMS POST DISCHARGE  pcp    DISCHARGE DIAGNOSES  Principal Problem:    Hyponatremia (POA: Yes)  Active Problems:    Hyperlipidemia with target LDL less than 130 (POA: Yes)    Hypertension (POA: Unknown)  Resolved Problems:    * No resolved hospital problems. *      FOLLOW UP  Future Appointments   Date Time Provider Department Center   7/25/2025  9:30 AM [facility] CT 1 [facility]   7/15/2026  9:00 AM [facility]     No follow-up provider specified.    MEDICATIONS ON DISCHARGE     Medication List        CHANGE how you take these medications        Instructions   atorvastatin 10 MG Tabs  What changed: See the new instructions.  Commonly known as: Lipitor   TAKE ONE TABLET  BY MOUTH EVERY DAY GENERIC FOR LIPITOR            CONTINUE taking these medications        Instructions   gabapentin 100 MG Caps  Commonly known as: Neurontin   Take 2 Capsules by mouth at bedtime.  Dose: 200 mg     losartan-hydrochlorothiazide 50-12.5 MG per tablet  Commonly known as: Hyzaar   TAKE ONE TABLET BY MOUTH EVERY DAY  Dose: 1 Tablet     PREVAGEN PO   Take 1 Tablet by mouth every day.  Dose: 1 Tablet     VITAMIN B-12 PO   Take 1 Tablet by mouth every day.  Dose: 1 Tablet     VITAMIN D PO   Take 1 Tablet by mouth every day.  Dose: 1 Tablet            STOP taking these medications      clindamycin 150 MG Caps  Commonly known as: Cleocin     hydrOXYzine HCl 25 MG Tabs  Commonly known as: Atarax              Allergies  Allergies[1]    DIET  Orders Placed This Encounter   Procedures    Diet Order Diet: Regular     Standing Status:   Standing     Number of Occurrences:   1     Diet::   Regular [1]       ACTIVITY  As tolerated.  Weight bearing as tolerated    CONSULTATIONS  none    PROCEDURES  none    LABORATORY  Lab Results   Component Value Date    SODIUM 132 (L) 07/24/2025    POTASSIUM 3.4 (L) 07/23/2025    CHLORIDE 93 (L) 07/23/2025    CO2 21 07/23/2025    GLUCOSE 100 (H) 07/23/2025    BUN 14 07/23/2025    CREATININE 0.64 07/23/2025    CREATININE 0.68 11/21/2012    GLOMRATE >59 03/23/2011        Lab Results   Component Value Date    WBC 7.0 07/23/2025    WBC 4.3 05/07/2010    HEMOGLOBIN 11.6 (L) 07/23/2025    HEMATOCRIT 33.1 (L) 07/23/2025    PLATELETCT 269 07/23/2025        Total time of the discharge process exceeds 35 minutes.            [1]   Allergies  Allergen Reactions    Compazine Anaphylaxis    Pcn [Penicillins] Unspecified     Unknown childhood reaction     Sulfa Drugs Unspecified     Unknown childhood reaction

## 2025-07-24 NOTE — H&P
"Hospital Medicine History & Physical Note    Date of Service  7/24/2025    Primary Care Physician  MANN Del Cid.    Consultants  None    Code Status  Full Code    Chief Complaint  Chief Complaint   Patient presents with    Abnormal Labs     PCP called pt and told her to go to the ED for a low sodium level of 129. Pt had gum surgery about a week ago and has some swelling to her left cheek. Pt states she has felt confused for the pass couple days but today much worst.    Diarrhea     Pt got severe constipation from pain medication after surgery, pt took laxatives and stool softener and now has severe diarrhea.        History of Presenting Illness  Mandie Juan is a 79 y.o. female who presented 7/23/2025 with diarrhea.  Two Tuesdays ago, patient was noted to have some sort of gum surgery in anticipation for having implants in 3 months.  She finished her course of 5 days of clindamycin after and was taking narcotic medications for the pain.  Shortly after taking narcotic medications, patient was noted to have constipation and abdominal pain.  She tried to remedy this by taking a stool softener, to which she began to have multiple episodes of explosive diarrhea per day.  She states that she would have 4-5 episodes.  She tried to waited off as symptoms and initially got better, but her diarrhea returned several times.  She began to feel an overall generalized weakness, and intermittent episodes of \" fuzziness\" in her head.  She believes that she lost about 10 pounds since her surgery.  She went to see her primary care doctor, and found to have labs of 129, and was recommended to come to the ER for further evaluation.    In the ER, patient found to have normal vital signs.  Patient found to have sodium 128, potassium 3.4.  CT head showing no acute intracranial abnormality, possibly tiny meningioma, CT abdomen pelvis showing hazy right lower lobe opacity suggesting subtle infiltrates, changes of cirrhosis " noted. Patient admitted for monitoring of hyponatremia.    I discussed the plan of care with patient.    Review of Systems  ROS    Past Medical History   has a past medical history of Allergy, Basal cell carcinoma (2004), Cervical polyp, Chronic bilateral low back pain without sciatica (2/16/2022), Colon polyps, DDD (degenerative disc disease), lumbar (2/16/2022), Episode of recurrent major depressive disorder (HCC) (1/25/2021), Generalized anxiety disorder (1/25/2021), GERD without esophagitis, Hyperlipidemia, Hypertension, essential, Impaired fasting glucose, Internal hemorrhoids, Kidney stones, Primary osteoarthritis involving multiple joints (1/7/2021), and Vitamin d deficiency.    Surgical History   has no past surgical history on file.     Family History  Family History   Problem Relation Age of Onset    Heart Disease Mother     Cancer Father     Hypertension Sister     Hypertension Brother     Cancer Paternal Grandfather         Family history reviewed with patient. There is no family history that is pertinent to the chief complaint.     Social History   reports that she has never smoked. She has never used smokeless tobacco. She reports current alcohol use of about 2.0 oz of alcohol per week. She reports that she does not use drugs.    Allergies  Allergies[1]    Medications  Prior to Admission Medications   Prescriptions Last Dose Informant Patient Reported? Taking?   Apoaequorin (PREVAGEN) 10 MG Cap   Yes No   Sig: Take 10 mg by mouth every Mon, Wed, Fri, Sat, and Sun at 6 PM.   VITAMIN D PO  Patient Yes No   Sig: Take 1 Tablet by mouth every day.   atorvastatin (LIPITOR) 10 MG Tab   No No   Sig: TAKE ONE TABLET BY MOUTH EVERY DAY GENERIC FOR LIPITOR   cyanocobalamin (VITAMIN B-12) 100 MCG Tab   Yes No   Sig: Take 100 mcg by mouth every day.   gabapentin (NEURONTIN) 100 MG Cap   No No   Sig: Take 2 Capsules by mouth at bedtime.   hydrOXYzine HCl (ATARAX) 25 MG Tab   No No   Sig: Take 1 Tablet by mouth 3  times a day as needed for Itching.   losartan-hydrochlorothiazide (HYZAAR) 50-12.5 MG per tablet   No No   Sig: TAKE ONE TABLET BY MOUTH EVERY DAY      Facility-Administered Medications: None       Physical Exam  Temp:  [36.8 °C (98.2 °F)] 36.8 °C (98.2 °F)  Pulse:  [69-76] 69  Resp:  [16] 16  BP: (140-159)/(72-75) 140/75  SpO2:  [96 %-97 %] 97 %  Blood Pressure : (!) 140/75   Temperature: 36.8 °C (98.2 °F)   Pulse: 69   Respiration: 16   Pulse Oximetry: 97 %       Physical Exam  Constitutional:       Appearance: Normal appearance. She is normal weight.   HENT:      Head: Normocephalic.      Nose: Nose normal.      Mouth/Throat:      Mouth: Mucous membranes are moist.   Eyes:      Pupils: Pupils are equal, round, and reactive to light.   Cardiovascular:      Rate and Rhythm: Normal rate and regular rhythm.      Pulses: Normal pulses.   Pulmonary:      Effort: Pulmonary effort is normal.      Breath sounds: Normal breath sounds.   Abdominal:      General: Abdomen is flat. Bowel sounds are normal.      Palpations: Abdomen is soft.   Musculoskeletal:         General: Normal range of motion.      Cervical back: Neck supple.   Skin:     General: Skin is warm.   Neurological:      Mental Status: She is alert and oriented to person, place, and time. Mental status is at baseline.   Psychiatric:         Mood and Affect: Mood normal.         Behavior: Behavior normal.         Thought Content: Thought content normal.         Judgment: Judgment normal.         Laboratory:  Recent Labs     07/23/25 2141   WBC 7.0   RBC 3.85*   HEMOGLOBIN 11.6*   HEMATOCRIT 33.1*   MCV 86.0   MCH 30.1   MCHC 35.0   RDW 37.6   PLATELETCT 269   MPV 8.7*     Recent Labs     07/23/25  1030 07/23/25 2141 07/24/25  0214   SODIUM 129* 128* 130*   POTASSIUM 3.7 3.4*  --    CHLORIDE 92* 93*  --    CO2 25 21  --    GLUCOSE 107* 100*  --    BUN 9 14  --    CREATININE 0.67 0.64  --    CALCIUM 10.0 9.1  --      Recent Labs     07/23/25  1030 07/23/25 2141  "  ALTSGPT  --  18   ASTSGOT  --  18   ALKPHOSPHAT  --  65   TBILIRUBIN  --  0.4   LIPASE  --  38   GLUCOSE 107* 100*         No results for input(s): \"NTPROBNP\" in the last 72 hours.      No results for input(s): \"TROPONINT\" in the last 72 hours.    Imaging:  CT-ABDOMEN-PELVIS WITH   Final Result         1.  Hazy right lower lobe opacities suggesting subtle infiltrates.   2.  Irregular hepatic contour favoring changes of cirrhosis.   3.  Scattered low-density hepatic lesions, appearance favoring cysts, otherwise indeterminate.   4.  Right ovarian cyst, could be followed up with pelvic ultrasound for further characterization.   5.  Uterine fibroid.   6.  Bilateral nephrolithiasis without visualized obstructive changes.   7.  Diverticulosis.   8.  Atherosclerosis and atherosclerotic coronary artery disease.            CT-HEAD W/O   Final Result         1.  No acute intracranial abnormality is identified, there are nonspecific white matter changes, commonly associated with small vessel ischemic disease. Associated mild cerebral atrophy is noted.   2.  Calcified mass abutting the right anterior parietal skull, appearance most compatible with tiny meningioma.   3.  Atherosclerosis          no X-Ray or EKG requiring interpretation    Assessment/Plan:  Justification for Admission Status  I anticipate this patient will require at least two midnights for appropriate medical management, necessitating inpatient admission because pt has hyponatremia    Patient will need a Med/Surg bed on MEDICAL service .  The need is secondary to hyponatremia.    * Hyponatremia- (present on admission)  Assessment & Plan  Initial sodium 128 in ER, repeat sodium 130.  Osmolality and urine studies pending at this time  CTAP negative for acute pathology.  Oral hydration encouraged.  Gentle hydration, trend sodium every 4 hours, monitor for overcorrection  C. Diff ordered as patient took 5 days of clindamycin  Hold home Hyzaar for " now      Hypertension  Assessment & Plan  Resume home medication    Hyperlipidemia with target LDL less than 130- (present on admission)  Assessment & Plan  Lipitor         VTE prophylaxis: enoxaparin ppx       [1]   Allergies  Allergen Reactions    Compazine     Pcn [Penicillins]     Sulfa Drugs

## 2025-07-24 NOTE — ED TRIAGE NOTES
"Chief Complaint   Patient presents with    Abnormal Labs     PCP called pt and told her to go to the ED for a low sodium level of 129. Pt had gum surgery about a week ago and has some swelling to her left cheek. Pt states she has felt confused for the pass couple days but today much worst.    Diarrhea     Pt got severe constipation from pain medication after surgery, pt took laxatives and stool softener and now has severe diarrhea.      Ht 1.651 m (5' 5\")   Wt 61.1 kg (134 lb 11.2 oz)   LMP 03/01/1993   BMI 22.42 kg/m²       "

## 2025-07-24 NOTE — ED PROVIDER NOTES
"ED Provider Note    CHIEF COMPLAINT  Chief Complaint   Patient presents with    Abnormal Labs     PCP called pt and told her to go to the ED for a low sodium level of 129. Pt had gum surgery about a week ago and has some swelling to her left cheek. Pt states she has felt confused for the pass couple days but today much worst.    Diarrhea     Pt got severe constipation from pain medication after surgery, pt took laxatives and stool softener and now has severe diarrhea.        EXTERNAL RECORDS REVIEWED  Outpatient family medicine note reviewed for medical history seen by nurse practitioner Sriram yesterday    HPI/ROS  LIMITATION TO HISTORY   Select: : None  OUTSIDE HISTORIAN(S):  Daughter at bedside providing clinically relevant collateral history    Mandie Juan is a 79 y.o. female with past medical history of generalized anxiety disorder hypertension hyperlipidemia presenting to the emergency department for diarrhea and \"fuzziness\" ongoing for the last few days.  Patient underwent a dental procedure about a week and a half ago, she was started on clindamycin and she was given a prescription for pain medication.  With the pain medication came some constipation so she started taking a stool softener and subsequently developed watery diarrhea which has been going on for the last week.  Patient was seen by primary care doctor, underwent labs today and sodium level was low at 129, primary care doctor sent her to the emergency department for evaluation.  She endorses some abdominal queasiness but no abdominal pain.  No episodes of vomiting.  No fever or chills.  No blood in her stool.  No prior history of C. difficile colitis    Patient has been able to hydrate, she has been drinking primarily water.  She is not on any diuretics        PAST MEDICAL HISTORY   has a past medical history of Allergy, Basal cell carcinoma (2004), Cervical polyp, Chronic bilateral low back pain without sciatica (2/16/2022), Colon " "polyps, DDD (degenerative disc disease), lumbar (2/16/2022), Episode of recurrent major depressive disorder (HCC) (1/25/2021), Generalized anxiety disorder (1/25/2021), GERD without esophagitis, Hyperlipidemia, Hypertension, essential, Impaired fasting glucose, Internal hemorrhoids, Kidney stones, Primary osteoarthritis involving multiple joints (1/7/2021), and Vitamin d deficiency.    SURGICAL HISTORY  patient denies any surgical history    FAMILY HISTORY  Family History   Problem Relation Age of Onset    Heart Disease Mother     Cancer Father     Hypertension Sister     Hypertension Brother     Cancer Paternal Grandfather        SOCIAL HISTORY  Social History     Tobacco Use    Smoking status: Never    Smokeless tobacco: Never   Vaping Use    Vaping status: Never Used   Substance and Sexual Activity    Alcohol use: Yes     Alcohol/week: 2.0 oz     Types: 4 Glasses of wine per week    Drug use: No    Sexual activity: Not Currently     Partners: Male       CURRENT MEDICATIONS  Home Medications       Reviewed by Gold Yadav R.N. (Registered Nurse) on 07/23/25 at 2055  Med List Status: Partial     Medication Last Dose Status   Apoaequorin (PREVAGEN) 10 MG Cap  Active   atorvastatin (LIPITOR) 10 MG Tab  Active   cyanocobalamin (VITAMIN B-12) 100 MCG Tab  Active   gabapentin (NEURONTIN) 100 MG Cap  Active   hydrOXYzine HCl (ATARAX) 25 MG Tab  Active   losartan-hydrochlorothiazide (HYZAAR) 50-12.5 MG per tablet  Active   VITAMIN D PO  Active                  Audit from Redirected Encounters    **Home medications have not yet been reviewed for this encounter**         ALLERGIES  Allergies[1]    PHYSICAL EXAM  VITAL SIGNS: BP (!) 140/75   Pulse 69   Temp 36.8 °C (98.2 °F) (Temporal)   Resp 16   Ht 1.651 m (5' 5\")   Wt 61.1 kg (134 lb 11.2 oz)   LMP 03/01/1993   SpO2 97%   BMI 22.42 kg/m²    General: non-toxic, no acute distress  Neuro: oriented x 3, moving all extremities.   HEENT:   - Head: Normocephalic, " atraumatic  - Eyes: PERRL  - Ears/Nose: normal external nose and ears  - Mouth: moist mucosal membranes  Resp: clear to auscultation, no increased work of breathing  CV: Regular rate and rhythm  Abd: Soft, non-tender, non-distended  Extremities: No peripheral edema  Psych: lucid and conversational              DIAGNOSTIC STUDIES / PROCEDURES    LABS and ECG  Results for orders placed or performed during the hospital encounter of 25   EKG    Collection Time: 25  9:06 PM   Result Value Ref Range    Report       [facility] Emergency Dept.    Test Date:  2025  Pt Name:    JIN CANNON              Department: [facility]  MRN:        [id number]                      Room:       -ROOM 1  Gender:     Female                       Technician: [id number]  :        [id number]                   Requested By:ER TRIAGE PROTOCOL  Order #:    [id number]                    Reading MD:    Measurements  Intervals                                Axis  Rate:       72                           P:          -7  MI:         170                          QRS:        -51  QRSD:       99                           T:          87  QT:         419  QTc:        459    Interpretive Statements  Sinus rhythm  Left anterior fascicular block  Abnormal R-wave progression, late transition  Left ventricular hypertrophy  Nonspecific T abnrm, anterolateral leads  Compared to ECG 10/01/2022 08:55:25  Left ventricular hypertrophy now present  T-wave abnormality no longer present     CBC WITH DIFFERENTIAL    Collection Time: 25  9:41 PM   Result Value Ref Range    WBC 7.0 4.8 - 10.8 K/uL    RBC 3.85 (L) 4.20 - 5.40 M/uL    Hemoglobin 11.6 (L) 12.0 - 16.0 g/dL    Hematocrit 33.1 (L) 37.0 - 47.0 %    MCV 86.0 81.4 - 97.8 fL    MCH 30.1 27.0 - 33.0 pg    MCHC 35.0 32.2 - 35.5 g/dL    RDW 37.6 35.9 - 50.0 fL    Platelet Count 269 164 - 446 K/uL    MPV 8.7 (L) 9.0 - 12.9 fL    Neutrophils-Polys 58.60 44.00 - 72.00 %     Lymphocytes 27.70 22.00 - 41.00 %    Monocytes 11.60 0.00 - 13.40 %    Eosinophils 1.40 0.00 - 6.90 %    Basophils 0.40 0.00 - 1.80 %    Immature Granulocytes 0.30 0.00 - 0.90 %    Nucleated RBC 0.00 0.00 - 0.20 /100 WBC    Neutrophils (Absolute) 4.10 1.82 - 7.42 K/uL    Lymphs (Absolute) 1.94 1.00 - 4.80 K/uL    Monos (Absolute) 0.81 0.00 - 0.85 K/uL    Eos (Absolute) 0.10 0.00 - 0.51 K/uL    Baso (Absolute) 0.03 0.00 - 0.12 K/uL    Immature Granulocytes (abs) 0.02 0.00 - 0.11 K/uL    NRBC (Absolute) 0.00 K/uL   COMP METABOLIC PANEL    Collection Time: 07/23/25  9:41 PM   Result Value Ref Range    Sodium 128 (L) 135 - 145 mmol/L    Potassium 3.4 (L) 3.6 - 5.5 mmol/L    Chloride 93 (L) 96 - 112 mmol/L    Co2 21 20 - 33 mmol/L    Anion Gap 14.0 7.0 - 16.0    Glucose 100 (H) 65 - 99 mg/dL    Bun 14 8 - 22 mg/dL    Creatinine 0.64 0.50 - 1.40 mg/dL    Calcium 9.1 8.5 - 10.5 mg/dL    Correct Calcium 9.3 8.5 - 10.5 mg/dL    AST(SGOT) 18 12 - 45 U/L    ALT(SGPT) 18 2 - 50 U/L    Alkaline Phosphatase 65 30 - 99 U/L    Total Bilirubin 0.4 0.1 - 1.5 mg/dL    Albumin 3.8 3.2 - 4.9 g/dL    Total Protein 6.3 6.0 - 8.2 g/dL    Globulin 2.5 1.9 - 3.5 g/dL    A-G Ratio 1.5 g/dL   LIPASE    Collection Time: 07/23/25  9:41 PM   Result Value Ref Range    Lipase 38 11 - 82 U/L   MAGNESIUM    Collection Time: 07/23/25  9:41 PM   Result Value Ref Range    Magnesium 1.8 1.5 - 2.5 mg/dL   ESTIMATED GFR    Collection Time: 07/23/25  9:41 PM   Result Value Ref Range    GFR (CKD-EPI) 88 >60 mL/min/1.73 m 2   AMMONIA    Collection Time: 07/23/25 11:20 PM   Result Value Ref Range    Ammonia 11 11 - 45 umol/L       RADIOLOGY  I have independently interpreted the diagnostic imaging associated with this visit and am waiting the final reading from the radiologist.   My preliminary interpretation is as follows:   - Reviewed CT abdomen pelvis as well as CT head shows no evidence of acute process  Radiologist interpretation:   CT-ABDOMEN-PELVIS WITH  "  Final Result         1.  Hazy right lower lobe opacities suggesting subtle infiltrates.   2.  Irregular hepatic contour favoring changes of cirrhosis.   3.  Scattered low-density hepatic lesions, appearance favoring cysts, otherwise indeterminate.   4.  Right ovarian cyst, could be followed up with pelvic ultrasound for further characterization.   5.  Uterine fibroid.   6.  Bilateral nephrolithiasis without visualized obstructive changes.   7.  Diverticulosis.   8.  Atherosclerosis and atherosclerotic coronary artery disease.            CT-HEAD W/O   Final Result         1.  No acute intracranial abnormality is identified, there are nonspecific white matter changes, commonly associated with small vessel ischemic disease. Associated mild cerebral atrophy is noted.   2.  Calcified mass abutting the right anterior parietal skull, appearance most compatible with tiny meningioma.   3.  Atherosclerosis              MEDICAL DECISION MAKING      ED COURSE AND PLAN    79-year-old female presenting to the emergency department as recommended by her primary care provider for low sodium on outpatient labs.  She has had diarrhea for the last week after experiencing constipation from some pain medication she was taking after a dental procedure.  On arrival her vital signs are stable she is not tachycardic.  She does not seem particularly dehydrated.  She says she feels \"fuzzy\" but she is not necessarily confused she is conversational, reading a book when I walked in the room  Patient is accompanied by her daughter who does say that she is slower than normal and seems a bit more disoriented than her normal sharp mentation.    Basic labs were obtained in triage, CBC shows mild normocytic anemia with a hemoglobin 2 points lower than prior labs obtained 2 years ago.  Chemistry reveals a sodium of 128.  On review her of her prior sodium she typically lives in the 139-140 range so this is a rather large drop for her.  Her ammonia is " normal.  Urine is pending.  CT head shows no acute intracranial process CT abdomen pelvis shows no evidence of colitis or toxic megacolon.  Radiologist remarked on possible infiltrates in the lungs patient has no pneumonia symptoms I reviewed the images and do not feel that the infiltrates suggest pneumonia.    Will plan to admit patient to hospital for monitoring of her sodium.  She was given a 250 cc bolus of normal saline while she was here in the emergency department.           Hydration: Based on the patient's presentation of Abnormal Fluid Loss the patient was given IV fluids. IV Hydration was used because oral hydration was not as rapid as required. Upon recheck following hydration, the patient was stable.    Procedures:      ----------------------------------------------------------------------------------  DISCUSSIONS    I have discussed management of the patient with the following physicians and MELVA's:      Discussion of management with other QHP or appropriate source(s):     Escalation of care considered, and ultimately not performed:    Barriers to care at this time, including but not limited to:     Decision tools and prescription drugs considered including, but not limited to:     FINAL IMPRESSION    1. Hyponatremia    2. Diarrhea, unspecified type        New Prescriptions    No medications on file       No follow-up provider specified.      DISPOSITION    Admission: The patient will be admitted for further evaluation and treatment. Discussed case with consultants and relayed all necessary information.      This chart was dictated using an electronic voice recognition software. The chart has been reviewed and edited but there is still possibility for dictation errors due to limitation of software.    Etienne Cary,  7/23/2025          [1]   Allergies  Allergen Reactions    Compazine     Pcn [Penicillins]     Sulfa Drugs

## 2025-07-25 ENCOUNTER — PATIENT OUTREACH (OUTPATIENT)
Dept: MEDICAL GROUP | Facility: MEDICAL CENTER | Age: 80
End: 2025-07-25
Payer: MEDICARE

## 2025-07-25 ENCOUNTER — DOCUMENTATION (OUTPATIENT)
Dept: HEALTH INFORMATION MANAGEMENT | Facility: OTHER | Age: 80
End: 2025-07-25
Payer: MEDICARE

## 2025-07-25 NOTE — PROGRESS NOTES
Transitional Care Management  TCM Outreach Date and Time: Filed (7/25/2025  9:33 AM)    Discharge Questions  Actual Discharge Date: 07/24/25  Now that you are home, how are you feeling?: Good  Did you receive any new prescriptions?: No (STOP hydroxyzine and clindamycin)  Do you have any questions about your current medications or new medications (Review Med Rec)?: No  Did you have any durable medical equipment ordered?: No  Do you have a follow up appointment scheduled with your PCP?: Yes  Appointment Date: 07/29/25  Appointment Time: 1400  Any issues or paperwork you wish to discuss with your PCP?: No  Are you (patient) able to get to the appointment?: Yes  If Home Health was ordered, have they contacted you (Patient): Not Applicable  Did you have enough support after your last discharge?: Yes  Does this patient qualify for the CCM program?: Yes    Transitional Care  Number of attempts made to contact patient: 1  Current or previous attempts completed within two business days of discharge? : Yes  Provided education regarding treatment plan, medications, self-management, ADLs?: Yes (ER precautions reviewed)  Has patient completed an Advanced Directive?: No  Has the Care Manager's phone number provided?: No  Is there anything else I can help you with?: No    Discharge Summary  Chief Complaint: Abnormal labs & diarrhea  Admitting Diagnosis: Hyponatremia  Discharge Diagnosis: Hyponatremia      Patient states no other questions, concerns, or needs at this time.

## 2025-07-28 ENCOUNTER — TELEPHONE (OUTPATIENT)
Dept: HEALTH INFORMATION MANAGEMENT | Facility: OTHER | Age: 80
End: 2025-07-28
Payer: MEDICARE

## 2025-07-29 ENCOUNTER — OFFICE VISIT (OUTPATIENT)
Dept: MEDICAL GROUP | Facility: MEDICAL CENTER | Age: 80
End: 2025-07-29
Payer: MEDICARE

## 2025-07-29 VITALS
BODY MASS INDEX: 22.52 KG/M2 | SYSTOLIC BLOOD PRESSURE: 126 MMHG | HEART RATE: 68 BPM | HEIGHT: 65 IN | DIASTOLIC BLOOD PRESSURE: 64 MMHG | OXYGEN SATURATION: 97 % | TEMPERATURE: 98.9 F | WEIGHT: 135.14 LBS

## 2025-07-29 DIAGNOSIS — K59.00 CONSTIPATION, UNSPECIFIED CONSTIPATION TYPE: ICD-10-CM

## 2025-07-29 DIAGNOSIS — N83.201 OVARIAN CYST, RIGHT: ICD-10-CM

## 2025-07-29 DIAGNOSIS — E87.1 HYPONATREMIA: Primary | ICD-10-CM

## 2025-07-29 DIAGNOSIS — R19.7 DIARRHEA, UNSPECIFIED TYPE: ICD-10-CM

## 2025-07-29 DIAGNOSIS — N20.0 KIDNEY STONES: ICD-10-CM

## 2025-07-29 DIAGNOSIS — K74.60 CIRRHOSIS OF LIVER WITHOUT ASCITES, UNSPECIFIED HEPATIC CIRRHOSIS TYPE (HCC): ICD-10-CM

## 2025-07-29 ASSESSMENT — FIBROSIS 4 INDEX: FIB4 SCORE: 1.28

## 2025-07-29 NOTE — PROGRESS NOTES
Subjective:     History of Present Illness  The patient is an 79-year-old female seen in hospitalization follow-up for hyponatremia and diarrhea.    Her sx were precipitated by taking antibiotic for dental infection.  She experienced severe abdominal pain, initially attributed to constipation. After passing a stool plug, she developed severe diarrhea. She has not had a bowel movement in the past two days, although she has been eating, albeit less than usual due to a slight loss of appetite. She is uncertain about her dietary choices and has been consuming Gatorade with a small amount of added salt.     She was sent to ER last wednesday for diarrhea concerning for bowel obstruction occuring after constipation.  Also na was 129, very low and associ with confusion.  She underwent a CT scan during her hospital stay, which showed rt ovarian cyst, liver lesions, osbaldo kidney stones, CAD & fibroid.  She reports no history of liver cirrhosis but consumes occasional wine. She has a history of kidney stones, having passed them naturally about 20 years ago. She has previously undergone stress tests and echocardiogram that were normal. She is concerned about potential memory issues assoc with the low na that have not entirely returned to normal.    She saw her dentist again today.  He feels she still has a dental infection and gave her     Diet: She has been eating less than usual due to a slight loss of appetite.  Alcohol: She consumes occasional wine.    PAST SURGICAL HISTORY:  She has a history of kidney stones, having passed them naturally about 20 years ago.  She has previously undergone stress tests and echocardiograms in 2021 and 2022 that did not show any concerning abn.     Results  - Labs:    - Sodium level: 132 (07/24/2025)    - Imaging:    - CT scan of abdomen and pelvis (07/24/2025):      - Irregular liver contour favoring changes of cirrhosis      - Some liver lesions favoring cysts      - Right ovarian cyst      -  Fibroid      - Bilateral renal calculi    Current medicines (including changes today)  Current Medications[1]  Current Medications[2]    She  has a past medical history of Allergy, Basal cell carcinoma (2004), Cervical polyp, Chronic bilateral low back pain without sciatica (2/16/2022), Colon polyps, DDD (degenerative disc disease), lumbar (2/16/2022), Episode of recurrent major depressive disorder (HCC) (1/25/2021), Generalized anxiety disorder (1/25/2021), GERD without esophagitis, Hyperlipidemia, Hypertension, essential, Impaired fasting glucose, Internal hemorrhoids, Kidney stones, Primary osteoarthritis involving multiple joints (1/7/2021), and Vitamin d deficiency.    She has no past medical history of Breast cancer ([facility]) or Clotting disorder ([facility]).    Complete Metabolic Panel:  Lab Results   Component Value Date/Time    SODIUM 132 (L) 07/24/2025 06:36 AM    POTASSIUM 3.4 (L) 07/23/2025 09:41 PM    CHLORIDE 93 (L) 07/23/2025 09:41 PM    CO2 21 07/23/2025 09:41 PM    ANION 14.0 07/23/2025 09:41 PM    GLUCOSE 100 (H) 07/23/2025 09:41 PM    BUN 14 07/23/2025 09:41 PM    CREATININE 0.64 07/23/2025 09:41 PM    CREATININE 0.68 11/21/2012 10:00 AM    ASTSGOT 18 07/23/2025 09:41 PM    ALTSGPT 18 07/23/2025 09:41 PM    TBILIRUBIN 0.4 07/23/2025 09:41 PM    ALBUMIN 3.8 07/23/2025 09:41 PM    TOTPROTEIN 6.3 07/23/2025 09:41 PM    GLOBULIN 2.5 07/23/2025 09:41 PM    AGRATIO 1.5 07/23/2025 09:41 PM         GFR:    Lab Results   Component Value Date/Time    GFRCKD 88 07/23/2025 2141         ROS   Review of Systems   Constitutional: Negative.  Negative for fever, chills, weight loss, diaphoresis.   HENT: Negative.  Negative for hearing loss, ear pain, nosebleeds, congestion, sore throat, neck pain, tinnitus and ear discharge.    Respiratory: Negative.  Negative for cough, hemoptysis, sputum production, shortness of breath, wheezing and stridor.    Cardiovascular: Negative.  Negative for chest pain, palpitations, orthopnea,  "claudication, leg swelling and PND.   Gastrointestinal: denies nausea, vomiting, heartburn, melena or hematochezia.  Genitourinary: Denies dysuria, hematuria, urinary incontinence, frequency or urgency.    Musculoskeletal: Negative.  Negative for myalgias and back pain.   Neurological: Negative.  Negative for dizziness, tingling, tremors, weakness and headaches.   Psych:  Denies depression, anxiety or insomnia.  All other systems reviewed and are negative.       Objective:     /64   Pulse 68   Temp 37.2 °C (98.9 °F) (Temporal)   Ht 1.651 m (5' 5\")   Wt 61.3 kg (135 lb 2.3 oz)   SpO2 97%  Body mass index is 22.49 kg/m².   Physical Exam  Respiratory: Clear to auscultation, no wheezing, rales or rhonchi  Cardiovascular: Heart murmur noted  Physical Exam   Vitals reviewed.  Constitutional: oriented to person, place, and time. appears well-developed and well-nourished. No distress.   Neck: No JVD present.  Cardiovascular: Normal rate, regular rhythm, normal heart sounds and intact distal pulses.  Exam reveals no gallop and no friction rub.  3/6 murmur heard.  No carotid bruits.   Pulmonary/Chest: Effort normal and breath sounds normal. No stridor. No respiratory distress. no wheezes or rales. exhibits no tenderness.   Musculoskeletal: Normal range of motion. exhibits no edema. osbaldo pedal pulses 2+.  Lymphadenopathy: no cervical or supraclavicular adenopathy.   Neurological: alert and oriented to person, place, and time. exhibits normal muscle tone. Coordination normal.   Skin: Skin is warm and dry. no diaphoresis.   Psychiatric: normal mood and affect. behavior is normal.       Assessment and Plan:   The following treatment plan was discussed    Assessment & Plan  1. Hyponatremia.  - Sodium levels were critically low, necessitating an emergency room visit.  - Levels have since improved to 132 but remain below the normal range.  - Advised to continue consuming Gatorade with a pinch of salt.  - Chemistry panel " ordered for 07/30/2025 to monitor electrolyte levels.    2. Diarrhea.  - Experienced severe diarrhea following constipation, likely due to antibiotic use.  - Advised to take Align probiotic once daily, either 2 hours before or after antibiotic dose.  - Recommended diet includes protein-rich and soft foods such as soups, pudding, mashed potatoes, rice, bananas, and applesauce.  - Encouraged to maintain good sources of protein to aid in healing and fighting off infections.    3. Constipation.  - Advised to take Metamucil in the morning, stool softeners once or twice daily depending on severity, and MiraLAX once daily as needed.  - If bowel movements are regular, these measures can be discontinued.  - Emphasized importance of drinking plenty of fluids.    4. Irregular hepatic contour favoring cirrhosis.  - Referral to gastroenterologist for further evaluation of potential liver cirrhosis and liver lesions.  - Liver enzymes have been normal, but further assessment is necessary.    5. Right ovarian cyst.  - Pelvic ultrasound will be ordered to monitor the right ovarian cyst.  - Previous imaging did not include a pelvic ultrasound.    6. Bilateral renal calculi.  - Referral to urology for further evaluation of bilateral kidney stones.  - History of kidney stones noted, but new ones are present on both sides.    7. Diverticulosis.  - Has diverticulosis, which increases risk for diverticulitis.  - No immediate intervention required unless symptoms develop.    8. Arterial plaque.  - Some plaque in the arteries around the heart noted.  - Echocardiogram and stress test considered but deemed unnecessary at this time due to recent normal results.    Follow-up: The patient will follow up in 1 month.      ORDERS:  1. Hyponatremia (Primary)    - Basic Metabolic Panel; Future    2. Diarrhea, unspecified type    - Referral to Gastroenterology    3. Constipation, unspecified constipation type    - Referral to Gastroenterology    4.  Kidney stones    - Referral to Urology    5. Ovarian cyst, right    - US-PELVIC COMPLETE (TRANSABDOMINAL/TRANSVAGINAL) (COMBO); Future    6. Cirrhosis of liver without ascites, unspecified hepatic cirrhosis type (HCC)    - Referral to Gastroenterology        Please note that this dictation was created using voice recognition software. I have made every reasonable attempt to correct obvious errors, but I expect that there are errors of grammar and possibly content that I did not discover before finalizing the note.      Attestation      Verbal consent was acquired by the patient to use [employer] ambient listening note generation during this visit Yes                  [1]   Current Outpatient Medications   Medication Sig Dispense Refill    Apoaequorin (PREVAGEN PO) Take 1 Tablet by mouth every day.      atorvastatin (LIPITOR) 10 MG Tab TAKE ONE TABLET BY MOUTH EVERY DAY GENERIC FOR LIPITOR (Patient taking differently: Take 10 mg by mouth every day.) 100 Tablet 3    gabapentin (NEURONTIN) 100 MG Cap Take 2 Capsules by mouth at bedtime. 200 Capsule 3    losartan-hydrochlorothiazide (HYZAAR) 50-12.5 MG per tablet TAKE ONE TABLET BY MOUTH EVERY  Tablet 3    Cyanocobalamin (VITAMIN B-12 PO) Take 1 Tablet by mouth every day.      VITAMIN D PO Take 1 Tablet by mouth every day.       No current facility-administered medications for this visit.   [2]   Current Outpatient Medications   Medication Sig Dispense Refill    Apoaequorin (PREVAGEN PO) Take 1 Tablet by mouth every day.      atorvastatin (LIPITOR) 10 MG Tab TAKE ONE TABLET BY MOUTH EVERY DAY GENERIC FOR LIPITOR (Patient taking differently: Take 10 mg by mouth every day.) 100 Tablet 3    gabapentin (NEURONTIN) 100 MG Cap Take 2 Capsules by mouth at bedtime. 200 Capsule 3    losartan-hydrochlorothiazide (HYZAAR) 50-12.5 MG per tablet TAKE ONE TABLET BY MOUTH EVERY  Tablet 3    Cyanocobalamin (VITAMIN B-12 PO) Take 1 Tablet by mouth every day.      VITAMIN  D PO Take 1 Tablet by mouth every day.       No current facility-administered medications for this visit.

## 2025-07-30 ENCOUNTER — HOSPITAL ENCOUNTER (OUTPATIENT)
Dept: LAB | Facility: MEDICAL CENTER | Age: 80
End: 2025-07-30
Attending: NURSE PRACTITIONER
Payer: MEDICARE

## 2025-07-30 DIAGNOSIS — E78.5 HYPERLIPIDEMIA LDL GOAL <100: ICD-10-CM

## 2025-07-30 DIAGNOSIS — I10 HYPERTENSION, ESSENTIAL: ICD-10-CM

## 2025-07-30 DIAGNOSIS — E87.1 HYPONATREMIA: ICD-10-CM

## 2025-07-30 LAB
ALBUMIN SERPL BCP-MCNC: 4.2 G/DL (ref 3.2–4.9)
ALBUMIN/GLOB SERPL: 1.6 G/DL
ALP SERPL-CCNC: 65 U/L (ref 30–99)
ALT SERPL-CCNC: 21 U/L (ref 2–50)
ANION GAP SERPL CALC-SCNC: 11 MMOL/L (ref 7–16)
AST SERPL-CCNC: 19 U/L (ref 12–45)
BILIRUB SERPL-MCNC: 0.5 MG/DL (ref 0.1–1.5)
BUN SERPL-MCNC: 12 MG/DL (ref 8–22)
CALCIUM ALBUM COR SERPL-MCNC: 9.6 MG/DL (ref 8.5–10.5)
CALCIUM SERPL-MCNC: 9.8 MG/DL (ref 8.5–10.5)
CHLORIDE SERPL-SCNC: 99 MMOL/L (ref 96–112)
CHOLEST SERPL-MCNC: 165 MG/DL (ref 100–199)
CO2 SERPL-SCNC: 25 MMOL/L (ref 20–33)
CREAT SERPL-MCNC: 0.74 MG/DL (ref 0.5–1.4)
FASTING STATUS PATIENT QL REPORTED: NORMAL
GFR SERPLBLD CREATININE-BSD FMLA CKD-EPI: 81 ML/MIN/1.73 M 2
GLOBULIN SER CALC-MCNC: 2.7 G/DL (ref 1.9–3.5)
GLUCOSE SERPL-MCNC: 90 MG/DL (ref 65–99)
HDLC SERPL-MCNC: 84 MG/DL
LDLC SERPL CALC-MCNC: 66 MG/DL
POTASSIUM SERPL-SCNC: 3.7 MMOL/L (ref 3.6–5.5)
PROT SERPL-MCNC: 6.9 G/DL (ref 6–8.2)
SODIUM SERPL-SCNC: 135 MMOL/L (ref 135–145)
TRIGL SERPL-MCNC: 74 MG/DL (ref 0–149)

## 2025-07-30 PROCEDURE — 36415 COLL VENOUS BLD VENIPUNCTURE: CPT

## 2025-07-30 PROCEDURE — 80053 COMPREHEN METABOLIC PANEL: CPT

## 2025-07-30 PROCEDURE — 80061 LIPID PANEL: CPT

## 2025-08-19 ENCOUNTER — HOSPITAL ENCOUNTER (OUTPATIENT)
Dept: LAB | Facility: MEDICAL CENTER | Age: 80
End: 2025-08-19
Payer: MEDICARE

## 2025-08-19 LAB
ALBUMIN SERPL BCP-MCNC: 4.5 G/DL (ref 3.2–4.9)
ALBUMIN/GLOB SERPL: 1.7 G/DL
ALP SERPL-CCNC: 83 U/L (ref 30–99)
ALT SERPL-CCNC: 23 U/L (ref 2–50)
ANION GAP SERPL CALC-SCNC: 11 MMOL/L (ref 7–16)
AST SERPL-CCNC: 22 U/L (ref 12–45)
BASOPHILS # BLD AUTO: 0.6 % (ref 0–1.8)
BASOPHILS # BLD: 0.02 K/UL (ref 0–0.12)
BILIRUB SERPL-MCNC: 0.4 MG/DL (ref 0.1–1.5)
BUN SERPL-MCNC: 12 MG/DL (ref 8–22)
CALCIUM ALBUM COR SERPL-MCNC: 9.4 MG/DL (ref 8.5–10.5)
CALCIUM SERPL-MCNC: 9.8 MG/DL (ref 8.5–10.5)
CHLORIDE SERPL-SCNC: 103 MMOL/L (ref 96–112)
CO2 SERPL-SCNC: 24 MMOL/L (ref 20–33)
CREAT SERPL-MCNC: 0.66 MG/DL (ref 0.5–1.4)
EOSINOPHIL # BLD AUTO: 0.06 K/UL (ref 0–0.51)
EOSINOPHIL NFR BLD: 1.7 % (ref 0–6.9)
ERYTHROCYTE [DISTWIDTH] IN BLOOD BY AUTOMATED COUNT: 41.7 FL (ref 35.9–50)
EST. AVERAGE GLUCOSE BLD GHB EST-MCNC: 128 MG/DL
FERRITIN SERPL-MCNC: 735 NG/ML (ref 10–291)
GFR SERPLBLD CREATININE-BSD FMLA CKD-EPI: 88 ML/MIN/1.73 M 2
GGT SERPL-CCNC: 13 U/L (ref 7–34)
GLOBULIN SER CALC-MCNC: 2.6 G/DL (ref 1.9–3.5)
GLUCOSE SERPL-MCNC: 98 MG/DL (ref 65–99)
HBA1C MFR BLD: 6.1 % (ref 4–5.6)
HBV CORE AB SERPL QL IA: NONREACTIVE
HBV SURFACE AB SERPL IA-ACNC: <3.5 MIU/ML (ref 0–10)
HBV SURFACE AG SER QL: NORMAL
HCT VFR BLD AUTO: 38.6 % (ref 37–47)
HCV AB SER QL: NORMAL
HGB BLD-MCNC: 12.7 G/DL (ref 12–16)
IMM GRANULOCYTES # BLD AUTO: 0 K/UL (ref 0–0.11)
IMM GRANULOCYTES NFR BLD AUTO: 0 % (ref 0–0.9)
INR PPP: 1 (ref 0.87–1.13)
IRON SATN MFR SERPL: 32 % (ref 15–55)
IRON SERPL-MCNC: 90 UG/DL (ref 40–170)
LYMPHOCYTES # BLD AUTO: 1.42 K/UL (ref 1–4.8)
LYMPHOCYTES NFR BLD: 39.8 % (ref 22–41)
MCH RBC QN AUTO: 29.9 PG (ref 27–33)
MCHC RBC AUTO-ENTMCNC: 32.9 G/DL (ref 32.2–35.5)
MCV RBC AUTO: 90.8 FL (ref 81.4–97.8)
MONOCYTES # BLD AUTO: 0.39 K/UL (ref 0–0.85)
MONOCYTES NFR BLD AUTO: 10.9 % (ref 0–13.4)
NEUTROPHILS # BLD AUTO: 1.68 K/UL (ref 1.82–7.42)
NEUTROPHILS NFR BLD: 47 % (ref 44–72)
NRBC # BLD AUTO: 0 K/UL
NRBC BLD-RTO: 0 /100 WBC (ref 0–0.2)
PLATELET # BLD AUTO: 284 K/UL (ref 164–446)
PMV BLD AUTO: 9.7 FL (ref 9–12.9)
POTASSIUM SERPL-SCNC: 4.1 MMOL/L (ref 3.6–5.5)
PROT SERPL-MCNC: 7.1 G/DL (ref 6–8.2)
PROTHROMBIN TIME: 13.3 SEC (ref 12–14.6)
RBC # BLD AUTO: 4.25 M/UL (ref 4.2–5.4)
SODIUM SERPL-SCNC: 138 MMOL/L (ref 135–145)
T4 FREE SERPL-MCNC: 1.05 NG/DL (ref 0.93–1.7)
TIBC SERPL-MCNC: 278 UG/DL (ref 250–450)
TSH SERPL-ACNC: 2.72 UIU/ML (ref 0.38–5.33)
UIBC SERPL-MCNC: 188 UG/DL (ref 110–370)
WBC # BLD AUTO: 3.6 K/UL (ref 4.8–10.8)

## 2025-08-19 PROCEDURE — 84439 ASSAY OF FREE THYROXINE: CPT

## 2025-08-19 PROCEDURE — 84460 ALANINE AMINO (ALT) (SGPT): CPT

## 2025-08-19 PROCEDURE — 86708 HEPATITIS A ANTIBODY: CPT

## 2025-08-19 PROCEDURE — 82947 ASSAY GLUCOSE BLOOD QUANT: CPT

## 2025-08-19 PROCEDURE — 86015 ACTIN ANTIBODY EACH: CPT

## 2025-08-19 PROCEDURE — 84478 ASSAY OF TRIGLYCERIDES: CPT

## 2025-08-19 PROCEDURE — 83883 ASSAY NEPHELOMETRY NOT SPEC: CPT

## 2025-08-19 PROCEDURE — 83010 ASSAY OF HAPTOGLOBIN QUANT: CPT

## 2025-08-19 PROCEDURE — 82465 ASSAY BLD/SERUM CHOLESTEROL: CPT

## 2025-08-19 PROCEDURE — 82172 ASSAY OF APOLIPOPROTEIN: CPT

## 2025-08-19 PROCEDURE — 82104 ALPHA-1-ANTITRYPSIN PHENO: CPT

## 2025-08-19 PROCEDURE — 82728 ASSAY OF FERRITIN: CPT

## 2025-08-19 PROCEDURE — 83036 HEMOGLOBIN GLYCOSYLATED A1C: CPT

## 2025-08-19 PROCEDURE — 82103 ALPHA-1-ANTITRYPSIN TOTAL: CPT

## 2025-08-19 PROCEDURE — 84450 TRANSFERASE (AST) (SGOT): CPT

## 2025-08-19 PROCEDURE — 82977 ASSAY OF GGT: CPT

## 2025-08-19 PROCEDURE — 87340 HEPATITIS B SURFACE AG IA: CPT

## 2025-08-19 PROCEDURE — 86381 MITOCHONDRIAL ANTIBODY EACH: CPT

## 2025-08-19 PROCEDURE — 36415 COLL VENOUS BLD VENIPUNCTURE: CPT

## 2025-08-19 PROCEDURE — 84443 ASSAY THYROID STIM HORMONE: CPT

## 2025-08-19 PROCEDURE — 86038 ANTINUCLEAR ANTIBODIES: CPT

## 2025-08-19 PROCEDURE — 85025 COMPLETE CBC W/AUTO DIFF WBC: CPT

## 2025-08-19 PROCEDURE — 82247 BILIRUBIN TOTAL: CPT

## 2025-08-19 PROCEDURE — 86704 HEP B CORE ANTIBODY TOTAL: CPT

## 2025-08-19 PROCEDURE — 86706 HEP B SURFACE ANTIBODY: CPT

## 2025-08-19 PROCEDURE — 83550 IRON BINDING TEST: CPT

## 2025-08-19 PROCEDURE — 85610 PROTHROMBIN TIME: CPT

## 2025-08-19 PROCEDURE — 83540 ASSAY OF IRON: CPT

## 2025-08-19 PROCEDURE — 80053 COMPREHEN METABOLIC PANEL: CPT

## 2025-08-19 PROCEDURE — 82390 ASSAY OF CERULOPLASMIN: CPT

## 2025-08-19 PROCEDURE — 86803 HEPATITIS C AB TEST: CPT

## 2025-08-21 LAB
HAV AB SER QL IA: NEGATIVE
MITOCHONDRIA M2 IGG SER-ACNC: 1.5 UNITS (ref 0–24.9)
SMA IGG SER-ACNC: 5 UNITS (ref 0–19)

## 2025-08-22 LAB
A2 MACROGLOB SERPL-MCNC: 191 MG/DL (ref 110–276)
ALT SERPL W P-5'-P-CCNC: 24 IU/L (ref 0–40)
APO A-I SERPL-MCNC: 218 MG/DL (ref 114–214)
AST SERPL W P-5'-P-CCNC: 20 IU/L (ref 0–40)
BILIRUB SERPL-MCNC: 0.5 MG/DL (ref 0–1.2)
CERULOPLASMIN SERPL-MCNC: 23 MG/DL (ref 16–45)
CHOLEST SERPL-MCNC: 200 MG/DL (ref 100–199)
FIBROSIS SCORING 550107: ABNORMAL
FIBROSIS STAGE SERPL QL: ABNORMAL
GGT SERPL-CCNC: 12 IU/L (ref 0–60)
GLUCOSE SERPL-MCNC: 97 MG/DL (ref 70–99)
HAPTOGLOB SERPL-MCNC: 152 MG/DL (ref 41–333)
LABORATORY COMMENT REPORT: ABNORMAL
LIVER FIBR SCORE SERPL CALC.FIBROSURE: 0.11 (ref 0–0.21)
LIVER STEATOSIS GRADE SERPL QL: ABNORMAL
LIVER STEATOSIS SCORE SERPL: 0.37 (ref 0–0.4)
NASH GRADE SERPL QL: ABNORMAL
NASH INTERPRETATION SERPL-IMP: ABNORMAL
NASH SCORE SERPL: 0 (ref 0–0.25)
NASH SCORING Z4789: ABNORMAL
NUCLEAR IGG SER QL IA: NORMAL
STEATOSIS SCORING NL11718: ABNORMAL
TEST PERFORMANCE INFO SPEC: ABNORMAL
TEST PERFORMANCE INFO SPEC: ABNORMAL
TRIGL SERPL-MCNC: 129 MG/DL (ref 0–149)

## 2025-08-23 LAB
A1AT PHENOTYP SERPL-IMP: NORMAL
A1AT SERPL-MCNC: 154 MG/DL (ref 90–200)

## 2025-08-27 ENCOUNTER — OFFICE VISIT (OUTPATIENT)
Dept: UROLOGY | Facility: MEDICAL CENTER | Age: 80
End: 2025-08-27
Payer: MEDICARE

## 2025-08-27 DIAGNOSIS — N20.0 CALCULUS OF KIDNEY: Primary | ICD-10-CM

## 2025-09-03 ENCOUNTER — APPOINTMENT (OUTPATIENT)
Dept: MEDICAL GROUP | Facility: MEDICAL CENTER | Age: 80
End: 2025-09-03
Payer: MEDICARE